# Patient Record
Sex: MALE | Race: WHITE | Employment: FULL TIME | URBAN - METROPOLITAN AREA
[De-identification: names, ages, dates, MRNs, and addresses within clinical notes are randomized per-mention and may not be internally consistent; named-entity substitution may affect disease eponyms.]

---

## 2021-01-04 ENCOUNTER — OFFICE VISIT (OUTPATIENT)
Dept: PRIMARY CARE CLINIC | Age: 51
End: 2021-01-04
Payer: COMMERCIAL

## 2021-01-04 VITALS
WEIGHT: 173.2 LBS | SYSTOLIC BLOOD PRESSURE: 120 MMHG | OXYGEN SATURATION: 97 % | TEMPERATURE: 98.8 F | BODY MASS INDEX: 24.25 KG/M2 | HEIGHT: 71 IN | DIASTOLIC BLOOD PRESSURE: 82 MMHG | RESPIRATION RATE: 16 BRPM | HEART RATE: 74 BPM

## 2021-01-04 DIAGNOSIS — Z13.220 SCREENING FOR HYPERLIPIDEMIA: ICD-10-CM

## 2021-01-04 DIAGNOSIS — N52.8 OTHER MALE ERECTILE DYSFUNCTION: ICD-10-CM

## 2021-01-04 DIAGNOSIS — F17.210 CIGARETTE NICOTINE DEPENDENCE WITHOUT COMPLICATION: ICD-10-CM

## 2021-01-04 DIAGNOSIS — R53.83 OTHER FATIGUE: ICD-10-CM

## 2021-01-04 PROCEDURE — 99203 OFFICE O/P NEW LOW 30 MIN: CPT | Performed by: INTERNAL MEDICINE

## 2021-01-04 RX ORDER — SILDENAFIL CITRATE 20 MG/1
TABLET ORAL
Qty: 20 TABLET | Refills: 1 | Status: SHIPPED | OUTPATIENT
Start: 2021-01-04 | End: 2021-11-15

## 2021-01-04 RX ORDER — SILDENAFIL 100 MG/1
TABLET, FILM COATED ORAL
COMMUNITY
Start: 2020-10-15 | End: 2021-01-04 | Stop reason: ALTCHOICE

## 2021-01-04 SDOH — ECONOMIC STABILITY: TRANSPORTATION INSECURITY
IN THE PAST 12 MONTHS, HAS LACK OF TRANSPORTATION KEPT YOU FROM MEETINGS, WORK, OR FROM GETTING THINGS NEEDED FOR DAILY LIVING?: NOT ASKED

## 2021-01-04 SDOH — HEALTH STABILITY: MENTAL HEALTH: HOW OFTEN DO YOU HAVE A DRINK CONTAINING ALCOHOL?: 4 OR MORE TIMES A WEEK

## 2021-01-04 ASSESSMENT — ENCOUNTER SYMPTOMS
SORE THROAT: 0
WHEEZING: 0
RHINORRHEA: 0
CHEST TIGHTNESS: 0
SWOLLEN GLANDS: 0
VISUAL CHANGE: 0
COUGH: 1
SINUS PRESSURE: 0
TROUBLE SWALLOWING: 0
NAUSEA: 0
ABDOMINAL PAIN: 0
VOMITING: 0
SHORTNESS OF BREATH: 0
BLOOD IN STOOL: 0
EYE DISCHARGE: 0
SINUS PAIN: 0
EYE PAIN: 0

## 2021-01-04 ASSESSMENT — PATIENT HEALTH QUESTIONNAIRE - PHQ9
2. FEELING DOWN, DEPRESSED OR HOPELESS: 0
SUM OF ALL RESPONSES TO PHQ QUESTIONS 1-9: 2
SUM OF ALL RESPONSES TO PHQ QUESTIONS 1-9: 2
SUM OF ALL RESPONSES TO PHQ9 QUESTIONS 1 & 2: 2
SUM OF ALL RESPONSES TO PHQ QUESTIONS 1-9: 2

## 2021-01-04 NOTE — PATIENT INSTRUCTIONS
Stop finasteride and testosterone supplements. Fasting blood work on February 1 [facility] lab. Regular exercise helps too. Sildenafil with cautions reminded. Tdap and flu vaccine from pharmacy. Strongly encourage you to quit using tobacco.   You can decrease your cigarette use by half every 2 weeks to quit smoking in 8 weeks or so. You can use off and on nicotine gum or lozenges to help quit smoking. The [address] for Disease Control and Prevention states:    Tobacco use harms every organ of the body which leads to disease and disability.  Tobacco use causes cancer, heart disease, stroke, and lung diseases (including emphysema, bronchitis, and chronic airway obstruction). We have many other ways to help you quit using tobacco.     We suggest this website:  www.smokefree. gov   You might also like this cell phone text message program.  Text message charges apply on your cell phone plan. Text the word QUIT to IQUIT to get started.  This program offers free telephone counseling.   Dial 7-128-QUIT-Now

## 2021-01-04 NOTE — PROGRESS NOTES
2021    Pietro Martinez (: 1970) is a 53 y.o. male, here for evaluation of the following medical concerns:    Chief Complaint   Patient presents with    New Patient       Since getting --not wanting sex. Before that he was having sex once a week. ED--getting hair pills---finasteride x 3 month-may be affecting his sexual function is explained to him this is a testosterone blocker medication so he should not be taking that. He has been taking sildenafil also from the mail-in pharmacy and he has been getting 100 mg but he does not need 100 mg he is using only 50 mg and 50 mg does fine with erection. Smoking knowing risk of cancer heart attack stroke and death and explained to him that he does need to quit smoking as soon as possible. 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 Fatigue  This is a new problem. The current episode started more than 1 year ago (9y). The problem occurs intermittently. The problem has been waxing and waning. Associated symptoms include coughing (  Smokers cough with no change) and fatigue. Pertinent negatives include no abdominal pain, arthralgias, chest pain, chills, congestion, fever, headaches, myalgias, nausea, numbness, rash, sore throat, swollen glands, urinary symptoms, visual change, vomiting or weakness. Nothing aggravates the symptoms. He has tried nothing for the symptoms. The treatment provided no relief. Review of Systems   Constitutional: Positive for fatigue. Negative for appetite change, chills, fever and unexpected weight change. HENT: Negative for congestion, ear discharge, ear pain, nosebleeds, rhinorrhea, sinus pressure, sinus pain, sore throat and trouble swallowing. Eyes: Negative for pain and discharge. Respiratory: Positive for cough (  Smokers cough with no change). Negative for chest tightness, shortness of breath and wheezing.     Cardiovascular: Negative for chest pain, palpitations and leg swelling. Gastrointestinal: Negative for abdominal pain, blood in stool, nausea and vomiting. Endocrine: Negative for polydipsia and polyphagia. Erectile dysfunction   Genitourinary: Negative for difficulty urinating, enuresis, flank pain and hematuria. Musculoskeletal: Negative for arthralgias and myalgias. Skin: Negative for rash. Neurological: Negative for facial asymmetry, weakness, light-headedness, numbness and headaches. Psychiatric/Behavioral: Negative for confusion. No current outpatient medications on file prior to visit. No current facility-administered medications on file prior to visit. No Known Allergies  History reviewed. No pertinent past medical history. History reviewed. No pertinent surgical history. Social History     Tobacco Use    Smoking status: Current Every Day Smoker     Types: Cigarettes    Smokeless tobacco: Never Used   Substance Use Topics    Alcohol use: Yes     Frequency: 4 or more times a week     Drinks per session: 5 or 6      Family History   Problem Relation Age of Onset    No Known Problems Mother     No Known Problems Father         Vitals:    01/04/21 1614   BP: 120/82   Site: Left Upper Arm   Position: Sitting   Cuff Size: Large Adult   Pulse: 74   Resp: 16   Temp: 98.8 °F (37.1 °C)   SpO2: 97%   Weight: 173 lb 3.2 oz (78.6 kg)   Height: 5' 10.5\" (1.791 m)     Estimated body mass index is 24.5 kg/m² as calculated from the following:    Height as of this encounter: 5' 10.5\" (1.791 m). Weight as of this encounter: 173 lb 3.2 oz (78.6 kg). Physical Exam  Vitals signs and nursing note reviewed. Constitutional:       General: He is not in acute distress. HENT:      Head: Normocephalic and atraumatic. Right Ear: External ear normal.      Left Ear: External ear normal.      Nose: Nose normal.   Eyes:      General: Lids are normal.      Extraocular Movements: Extraocular movements intact. Conjunctiva/sclera: Conjunctivae normal.      Pupils: Pupils are equal, round, and reactive to light. Neck:      Musculoskeletal: Neck supple. Thyroid: No thyromegaly. Vascular: No JVD. Trachea: No tracheal deviation. Cardiovascular:      Rate and Rhythm: Normal rate and regular rhythm. Heart sounds: Normal heart sounds. No gallop. Pulmonary:      Effort: Pulmonary effort is normal. No respiratory distress. Breath sounds: Normal breath sounds. No wheezing or rales. Abdominal:      General: Bowel sounds are normal. There is no distension. Palpations: Abdomen is soft. There is no mass. Tenderness: There is no abdominal tenderness. There is no guarding or rebound. Hernia: A hernia is present. Genitourinary:     Penis: Normal.       Testes: Normal.   Musculoskeletal:         General: No tenderness. Comments: No leg edema or calf tenderness   Lymphadenopathy:      Cervical: No cervical adenopathy. Skin:     General: Skin is warm and dry. Findings: No rash. Neurological:      General: No focal deficit present. Mental Status: He is alert and oriented to person, place, and time. Cranial Nerves: No cranial nerve deficit. Sensory: No sensory deficit. Psychiatric:         Mood and Affect: Mood normal.         Behavior: Behavior normal.         Thought Content: Thought content normal.         Judgment: Judgment normal.         ASSESSMENT/PLAN:  1. Other fatigue    - CBC WITH AUTO DIFFERENTIAL; Future  - Comprehensive Metabolic Panel; Future  - TSH with Reflex; Future  - T4, Free; Future    2. Other male erectile dysfunction    - Testosterone, free, total; Future  - sildenafil (REVATIO) 20 MG tablet; Take only 1 to 2 pills every 3 days as needed  Dispense: 20 tablet; Refill: 1    3. Screening for hyperlipidemia    - LIPID PANEL; Future    4. Nicotine dependence--      Return in about 5 weeks (around 2/8/2021) for labs.      Patient Instructions Stop finasteride and testosterone supplements. Fasting blood work on February 1 [facility] lab. Regular exercise helps too. Sildenafil with cautions reminded. Tdap and flu vaccine from pharmacy. Strongly encourage you to quit using tobacco.   You can decrease your cigarette use by half every 2 weeks to quit smoking in 8 weeks or so. You can use off and on nicotine gum or lozenges to help quit smoking. The [address] for Disease Control and Prevention states:    Tobacco use harms every organ of the body which leads to disease and disability.  Tobacco use causes cancer, heart disease, stroke, and lung diseases (including emphysema, bronchitis, and chronic airway obstruction). We have many other ways to help you quit using tobacco.     We suggest this website:  www.smokefree. gov   You might also like this cell phone text message program.  Text message charges apply on your cell phone plan. Text the word QUIT to IQUIT to get started.  This program offers free telephone counseling. Dial 1-800-QUIT-Now                        Electronically signed by Channing Enciso MD on 1/4/2021 at 6:27 PM     This dictation was generated by voice recognition computer software. Although all attempts are made to edit the dictation for accuracy, there may be errors in the transcription that are not intended.

## 2021-02-03 PROBLEM — Z13.220 SCREENING FOR HYPERLIPIDEMIA: Status: RESOLVED | Noted: 2021-01-04 | Resolved: 2021-02-03

## 2021-02-05 DIAGNOSIS — N52.8 OTHER MALE ERECTILE DYSFUNCTION: ICD-10-CM

## 2021-02-08 RX ORDER — SILDENAFIL CITRATE 20 MG/1
TABLET ORAL
Qty: 20 TABLET | Refills: 1 | OUTPATIENT
Start: 2021-02-08

## 2021-04-27 ENCOUNTER — TELEPHONE (OUTPATIENT)
Dept: PRIMARY CARE CLINIC | Age: 51
End: 2021-04-27

## 2021-04-27 NOTE — TELEPHONE ENCOUNTER
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

## 2021-09-13 DIAGNOSIS — N52.8 OTHER MALE ERECTILE DYSFUNCTION: ICD-10-CM

## 2021-09-13 RX ORDER — SILDENAFIL CITRATE 20 MG/1
TABLET ORAL
Qty: 20 TABLET | Refills: 1 | OUTPATIENT
Start: 2021-09-13

## 2021-10-18 ENCOUNTER — APPOINTMENT (OUTPATIENT)
Dept: CT IMAGING | Age: 51
End: 2021-10-18
Payer: COMMERCIAL

## 2021-10-18 ENCOUNTER — HOSPITAL ENCOUNTER (EMERGENCY)
Age: 51
Discharge: LEFT BEFORE DISCHARGE | End: 2021-10-18
Attending: EMERGENCY MEDICINE
Payer: COMMERCIAL

## 2021-10-18 ENCOUNTER — HOSPITAL ENCOUNTER (EMERGENCY)
Age: 51
Discharge: LEFT BEFORE DISCHARGE | End: 2021-10-18
Payer: COMMERCIAL

## 2021-10-18 VITALS
DIASTOLIC BLOOD PRESSURE: 83 MMHG | HEART RATE: 85 BPM | OXYGEN SATURATION: 98 % | TEMPERATURE: 100 F | RESPIRATION RATE: 16 BRPM | SYSTOLIC BLOOD PRESSURE: 132 MMHG

## 2021-10-18 VITALS
DIASTOLIC BLOOD PRESSURE: 82 MMHG | SYSTOLIC BLOOD PRESSURE: 125 MMHG | TEMPERATURE: 100.4 F | RESPIRATION RATE: 18 BRPM | OXYGEN SATURATION: 95 % | HEART RATE: 80 BPM

## 2021-10-18 DIAGNOSIS — R10.9 ABDOMINAL PAIN, UNSPECIFIED ABDOMINAL LOCATION: Primary | ICD-10-CM

## 2021-10-18 DIAGNOSIS — R74.01 TRANSAMINITIS: ICD-10-CM

## 2021-10-18 DIAGNOSIS — K57.92 ACUTE DIVERTICULITIS: Primary | ICD-10-CM

## 2021-10-18 DIAGNOSIS — Z53.29 LEFT AGAINST MEDICAL ADVICE: ICD-10-CM

## 2021-10-18 LAB
A/G RATIO: 0.9 (ref 1.1–2.2)
A/G RATIO: 0.9 (ref 1.1–2.2)
ALBUMIN SERPL-MCNC: 3.9 G/DL (ref 3.4–5)
ALBUMIN SERPL-MCNC: 3.9 G/DL (ref 3.4–5)
ALP BLD-CCNC: 108 U/L (ref 40–129)
ALP BLD-CCNC: 129 U/L (ref 40–129)
ALT SERPL-CCNC: 357 U/L (ref 10–40)
ALT SERPL-CCNC: 387 U/L (ref 10–40)
ANION GAP SERPL CALCULATED.3IONS-SCNC: 13 MMOL/L (ref 3–16)
ANION GAP SERPL CALCULATED.3IONS-SCNC: 9 MMOL/L (ref 3–16)
AST SERPL-CCNC: 315 U/L (ref 15–37)
AST SERPL-CCNC: 320 U/L (ref 15–37)
BASOPHILS ABSOLUTE: 0 K/UL (ref 0–0.2)
BASOPHILS ABSOLUTE: 0.1 K/UL (ref 0–0.2)
BASOPHILS RELATIVE PERCENT: 0.4 %
BASOPHILS RELATIVE PERCENT: 1 %
BILIRUB SERPL-MCNC: 0.4 MG/DL (ref 0–1)
BILIRUB SERPL-MCNC: 0.5 MG/DL (ref 0–1)
BILIRUBIN URINE: NEGATIVE
BLOOD, URINE: ABNORMAL
BUN BLDV-MCNC: 6 MG/DL (ref 7–20)
BUN BLDV-MCNC: 7 MG/DL (ref 7–20)
CALCIUM SERPL-MCNC: 9.3 MG/DL (ref 8.3–10.6)
CALCIUM SERPL-MCNC: 9.5 MG/DL (ref 8.3–10.6)
CHLORIDE BLD-SCNC: 95 MMOL/L (ref 99–110)
CHLORIDE BLD-SCNC: 96 MMOL/L (ref 99–110)
CLARITY: CLEAR
CO2: 20 MMOL/L (ref 21–32)
CO2: 24 MMOL/L (ref 21–32)
COLOR: YELLOW
CREAT SERPL-MCNC: 0.7 MG/DL (ref 0.9–1.3)
CREAT SERPL-MCNC: 0.9 MG/DL (ref 0.9–1.3)
EOSINOPHILS ABSOLUTE: 0.1 K/UL (ref 0–0.6)
EOSINOPHILS ABSOLUTE: 0.1 K/UL (ref 0–0.6)
EOSINOPHILS RELATIVE PERCENT: 0.6 %
EOSINOPHILS RELATIVE PERCENT: 0.7 %
EPITHELIAL CELLS, UA: 1 /HPF (ref 0–5)
GFR AFRICAN AMERICAN: >60
GFR AFRICAN AMERICAN: >60
GFR NON-AFRICAN AMERICAN: >60
GFR NON-AFRICAN AMERICAN: >60
GLOBULIN: 4.2 G/DL
GLOBULIN: 4.2 G/DL
GLUCOSE BLD-MCNC: 118 MG/DL (ref 70–99)
GLUCOSE BLD-MCNC: 121 MG/DL (ref 70–99)
GLUCOSE URINE: NEGATIVE MG/DL
HCT VFR BLD CALC: 49.4 % (ref 40.5–52.5)
HCT VFR BLD CALC: 50.7 % (ref 40.5–52.5)
HEMOGLOBIN: 17.5 G/DL (ref 13.5–17.5)
HEMOGLOBIN: 17.9 G/DL (ref 13.5–17.5)
HYALINE CASTS: 0 /LPF (ref 0–8)
KETONES, URINE: 15 MG/DL
LEUKOCYTE ESTERASE, URINE: NEGATIVE
LIPASE: 40 U/L (ref 13–60)
LIPASE: 43 U/L (ref 13–60)
LYMPHOCYTES ABSOLUTE: 0.5 K/UL (ref 1–5.1)
LYMPHOCYTES ABSOLUTE: 0.5 K/UL (ref 1–5.1)
LYMPHOCYTES RELATIVE PERCENT: 5.1 %
LYMPHOCYTES RELATIVE PERCENT: 5.1 %
MCH RBC QN AUTO: 33.3 PG (ref 26–34)
MCH RBC QN AUTO: 33.3 PG (ref 26–34)
MCHC RBC AUTO-ENTMCNC: 35.3 G/DL (ref 31–36)
MCHC RBC AUTO-ENTMCNC: 35.4 G/DL (ref 31–36)
MCV RBC AUTO: 94 FL (ref 80–100)
MCV RBC AUTO: 94.4 FL (ref 80–100)
MICROSCOPIC EXAMINATION: YES
MONOCYTES ABSOLUTE: 0.7 K/UL (ref 0–1.3)
MONOCYTES ABSOLUTE: 0.9 K/UL (ref 0–1.3)
MONOCYTES RELATIVE PERCENT: 7.4 %
MONOCYTES RELATIVE PERCENT: 9.5 %
NEUTROPHILS ABSOLUTE: 7.6 K/UL (ref 1.7–7.7)
NEUTROPHILS ABSOLUTE: 8 K/UL (ref 1.7–7.7)
NEUTROPHILS RELATIVE PERCENT: 84.3 %
NEUTROPHILS RELATIVE PERCENT: 85.9 %
NITRITE, URINE: NEGATIVE
PDW BLD-RTO: 13.1 % (ref 12.4–15.4)
PDW BLD-RTO: 13.4 % (ref 12.4–15.4)
PH UA: 5.5 (ref 5–8)
PLATELET # BLD: 134 K/UL (ref 135–450)
PLATELET # BLD: 135 K/UL (ref 135–450)
PMV BLD AUTO: 8.4 FL (ref 5–10.5)
PMV BLD AUTO: 8.7 FL (ref 5–10.5)
POTASSIUM REFLEX MAGNESIUM: 4 MMOL/L (ref 3.5–5.1)
POTASSIUM SERPL-SCNC: 3.7 MMOL/L (ref 3.5–5.1)
PROTEIN UA: 100 MG/DL
RBC # BLD: 5.25 M/UL (ref 4.2–5.9)
RBC # BLD: 5.37 M/UL (ref 4.2–5.9)
RBC UA: 4 /HPF (ref 0–4)
SODIUM BLD-SCNC: 128 MMOL/L (ref 136–145)
SODIUM BLD-SCNC: 129 MMOL/L (ref 136–145)
SPECIFIC GRAVITY UA: 1.01 (ref 1–1.03)
TOTAL PROTEIN: 8.1 G/DL (ref 6.4–8.2)
TOTAL PROTEIN: 8.1 G/DL (ref 6.4–8.2)
URINE REFLEX TO CULTURE: ABNORMAL
URINE TYPE: ABNORMAL
UROBILINOGEN, URINE: 1 E.U./DL
WBC # BLD: 9.1 K/UL (ref 4–11)
WBC # BLD: 9.4 K/UL (ref 4–11)
WBC UA: 2 /HPF (ref 0–5)

## 2021-10-18 PROCEDURE — 96374 THER/PROPH/DIAG INJ IV PUSH: CPT

## 2021-10-18 PROCEDURE — 83690 ASSAY OF LIPASE: CPT

## 2021-10-18 PROCEDURE — 6360000002 HC RX W HCPCS: Performed by: PHYSICIAN ASSISTANT

## 2021-10-18 PROCEDURE — U0005 INFEC AGEN DETEC AMPLI PROBE: HCPCS

## 2021-10-18 PROCEDURE — 81001 URINALYSIS AUTO W/SCOPE: CPT

## 2021-10-18 PROCEDURE — 74177 CT ABD & PELVIS W/CONTRAST: CPT

## 2021-10-18 PROCEDURE — U0003 INFECTIOUS AGENT DETECTION BY NUCLEIC ACID (DNA OR RNA); SEVERE ACUTE RESPIRATORY SYNDROME CORONAVIRUS 2 (SARS-COV-2) (CORONAVIRUS DISEASE [COVID-19]), AMPLIFIED PROBE TECHNIQUE, MAKING USE OF HIGH THROUGHPUT TECHNOLOGIES AS DESCRIBED BY CMS-2020-01-R: HCPCS

## 2021-10-18 PROCEDURE — 85025 COMPLETE CBC W/AUTO DIFF WBC: CPT

## 2021-10-18 PROCEDURE — 6370000000 HC RX 637 (ALT 250 FOR IP): Performed by: PHYSICIAN ASSISTANT

## 2021-10-18 PROCEDURE — 99282 EMERGENCY DEPT VISIT SF MDM: CPT

## 2021-10-18 PROCEDURE — 99283 EMERGENCY DEPT VISIT LOW MDM: CPT

## 2021-10-18 PROCEDURE — 80053 COMPREHEN METABOLIC PANEL: CPT

## 2021-10-18 PROCEDURE — 6370000000 HC RX 637 (ALT 250 FOR IP): Performed by: EMERGENCY MEDICINE

## 2021-10-18 PROCEDURE — 6360000004 HC RX CONTRAST MEDICATION: Performed by: PHYSICIAN ASSISTANT

## 2021-10-18 PROCEDURE — 2580000003 HC RX 258: Performed by: PHYSICIAN ASSISTANT

## 2021-10-18 PROCEDURE — 36415 COLL VENOUS BLD VENIPUNCTURE: CPT

## 2021-10-18 RX ORDER — ONDANSETRON 4 MG/1
4 TABLET, ORALLY DISINTEGRATING ORAL ONCE
Status: COMPLETED | OUTPATIENT
Start: 2021-10-18 | End: 2021-10-18

## 2021-10-18 RX ORDER — CIPROFLOXACIN 500 MG/1
500 TABLET, FILM COATED ORAL EVERY 12 HOURS SCHEDULED
Status: DISCONTINUED | OUTPATIENT
Start: 2021-10-18 | End: 2021-10-18 | Stop reason: HOSPADM

## 2021-10-18 RX ORDER — ACETAMINOPHEN 500 MG
1000 TABLET ORAL ONCE
Status: DISCONTINUED | OUTPATIENT
Start: 2021-10-18 | End: 2021-10-18 | Stop reason: HOSPADM

## 2021-10-18 RX ORDER — PROMETHAZINE HYDROCHLORIDE 25 MG/ML
25 INJECTION, SOLUTION INTRAMUSCULAR; INTRAVENOUS ONCE
Status: DISCONTINUED | OUTPATIENT
Start: 2021-10-18 | End: 2021-10-18 | Stop reason: HOSPADM

## 2021-10-18 RX ORDER — HYDROCODONE BITARTRATE AND ACETAMINOPHEN 5; 325 MG/1; MG/1
1 TABLET ORAL EVERY 6 HOURS PRN
Qty: 10 TABLET | Refills: 0 | Status: SHIPPED | OUTPATIENT
Start: 2021-10-18 | End: 2021-10-21

## 2021-10-18 RX ORDER — ONDANSETRON 4 MG/1
4 TABLET, ORALLY DISINTEGRATING ORAL EVERY 8 HOURS PRN
Qty: 20 TABLET | Refills: 0 | Status: SHIPPED | OUTPATIENT
Start: 2021-10-18 | End: 2021-11-15

## 2021-10-18 RX ORDER — KETOROLAC TROMETHAMINE 30 MG/ML
30 INJECTION, SOLUTION INTRAMUSCULAR; INTRAVENOUS ONCE
Status: COMPLETED | OUTPATIENT
Start: 2021-10-18 | End: 2021-10-18

## 2021-10-18 RX ORDER — 0.9 % SODIUM CHLORIDE 0.9 %
1000 INTRAVENOUS SOLUTION INTRAVENOUS ONCE
Status: COMPLETED | OUTPATIENT
Start: 2021-10-18 | End: 2021-10-18

## 2021-10-18 RX ORDER — CIPROFLOXACIN 500 MG/1
500 TABLET, FILM COATED ORAL 2 TIMES DAILY
Qty: 14 TABLET | Refills: 0 | Status: ON HOLD | OUTPATIENT
Start: 2021-10-18 | End: 2021-10-30 | Stop reason: HOSPADM

## 2021-10-18 RX ORDER — 0.9 % SODIUM CHLORIDE 0.9 %
1000 INTRAVENOUS SOLUTION INTRAVENOUS ONCE
Status: DISCONTINUED | OUTPATIENT
Start: 2021-10-18 | End: 2021-10-18 | Stop reason: HOSPADM

## 2021-10-18 RX ORDER — KETOROLAC TROMETHAMINE 30 MG/ML
30 INJECTION, SOLUTION INTRAMUSCULAR; INTRAVENOUS ONCE
Status: DISCONTINUED | OUTPATIENT
Start: 2021-10-18 | End: 2021-10-18 | Stop reason: HOSPADM

## 2021-10-18 RX ORDER — OXYCODONE HYDROCHLORIDE AND ACETAMINOPHEN 5; 325 MG/1; MG/1
2 TABLET ORAL ONCE
Status: COMPLETED | OUTPATIENT
Start: 2021-10-18 | End: 2021-10-18

## 2021-10-18 RX ORDER — METRONIDAZOLE 500 MG/1
500 TABLET ORAL 2 TIMES DAILY
Qty: 14 TABLET | Refills: 0 | Status: ON HOLD | OUTPATIENT
Start: 2021-10-18 | End: 2021-10-30 | Stop reason: HOSPADM

## 2021-10-18 RX ORDER — METRONIDAZOLE 250 MG/1
500 TABLET ORAL ONCE
Status: COMPLETED | OUTPATIENT
Start: 2021-10-18 | End: 2021-10-18

## 2021-10-18 RX ADMIN — KETOROLAC TROMETHAMINE 30 MG: 30 INJECTION, SOLUTION INTRAMUSCULAR; INTRAVENOUS at 19:23

## 2021-10-18 RX ADMIN — IOPAMIDOL 75 ML: 755 INJECTION, SOLUTION INTRAVENOUS at 18:10

## 2021-10-18 RX ADMIN — METRONIDAZOLE 500 MG: 250 TABLET ORAL at 19:51

## 2021-10-18 RX ADMIN — ONDANSETRON 4 MG: 4 TABLET, ORALLY DISINTEGRATING ORAL at 17:39

## 2021-10-18 RX ADMIN — CIPROFLOXACIN 500 MG: 500 TABLET, FILM COATED ORAL at 19:51

## 2021-10-18 RX ADMIN — SODIUM CHLORIDE 1000 ML: 9 INJECTION, SOLUTION INTRAVENOUS at 19:23

## 2021-10-18 RX ADMIN — OXYCODONE HYDROCHLORIDE AND ACETAMINOPHEN 2 TABLET: 5; 325 TABLET ORAL at 17:39

## 2021-10-18 ASSESSMENT — ENCOUNTER SYMPTOMS
ABDOMINAL PAIN: 1
BACK PAIN: 0
RHINORRHEA: 0
SHORTNESS OF BREATH: 0
NAUSEA: 1
SHORTNESS OF BREATH: 0
NAUSEA: 1
CHEST TIGHTNESS: 0
DIARRHEA: 0
VOMITING: 1
WHEEZING: 0
ABDOMINAL PAIN: 0
DIARRHEA: 0
VOMITING: 1
COUGH: 0
SORE THROAT: 0

## 2021-10-18 ASSESSMENT — PAIN SCALES - GENERAL
PAINLEVEL_OUTOF10: 7
PAINLEVEL_OUTOF10: 6
PAINLEVEL_OUTOF10: 7

## 2021-10-18 NOTE — ED PROVIDER NOTES
[address] [facility]        Pt Name: Micaela Palafox  MRN: [id number]  [place] 1970  Date of evaluation: 10/18/2021  Provider: LUDA Tapia CNP  PCP: Alon Jimenez MD  Note Started: 12:24 PM EDT       ALBERTO. I have evaluated this patient. My supervising physician was available for consultation. CHIEF COMPLAINT       Chief Complaint   Patient presents with    Abdominal Pain     thought at first it was food poisioning, however having vomiting, pain, nausea, and came from urgent care, [facility] [id number]   (Location, Timing/Onset, Context/Setting, Quality, Duration, Modifying Factors, Severity, Associated Signs and Symptoms)  Note limiting factors. Chief Complaint: Lower abdominal pain, nausea, and vomiting which began Saturday    Mark Anthony Joseph is a 49 y.o. male who presents to the emergency department today after being seen by local urgent care for evaluation of lower abdominal pain and vomiting. Patient states that urgent care was concerned for possible appendicitis. Patient is otherwise healthy without any chronic medical conditions. There is no history of any inflammatory bowel disease. He denies recent travel or known sick contacts. He currently reports a pain level of 7 out of 10. He describes the pain as constant dull with intermittent cramp-like sensations. He states the pain radiates across his entire lower abdomen. He has not taken anything for symptoms. He states that the pain radiates into his rectum as well. He denies any urinary symptoms. Patient states that he has had subjective fever and chills all weekend. He denies hematemesis, hematochezia, or melena. Nursing Notes were all reviewed and agreed with or any disagreements were addressed in the HPI.     REVIEW OF SYSTEMS    (2-9 systems for level 4, 10 or more for level 5)     Review of Systems   Constitutional: Positive for appetite change, chills and fever. HENT: Negative for congestion and sore throat. Eyes: Negative for visual disturbance. Respiratory: Negative for chest tightness, shortness of breath and wheezing. Cardiovascular: Negative for chest pain and palpitations. Gastrointestinal: Positive for abdominal pain, nausea and vomiting. Negative for diarrhea. Endocrine: Negative for polydipsia and polyuria. Genitourinary: Negative for difficulty urinating and dysuria. Musculoskeletal: Negative for back pain. Skin: Negative for rash and wound. Neurological: Negative for dizziness, weakness and light-headedness. Hematological: Does not bruise/bleed easily. Psychiatric/Behavioral: Negative for suicidal ideas. Positives and Pertinent negatives as per HPI. Except as noted above in the ROS, all other systems were reviewed and negative. PAST MEDICAL HISTORY   History reviewed. No pertinent past medical history. SURGICAL HISTORY   History reviewed. No pertinent surgical history. CURRENTMEDICATIONS       Previous Medications    SILDENAFIL (REVATIO) 20 MG TABLET    Take only 1 to 2 pills every 3 days as needed         ALLERGIES     Patient has no known allergies. FAMILYHISTORY       Family History   Problem Relation Age of Onset    No Known Problems Mother     No Known Problems Father           SOCIAL HISTORY       Social History     Tobacco Use    Smoking status: Current Every Day Smoker     Types: Cigarettes    Smokeless tobacco: Never Used   Vaping Use    Vaping Use: Never used   Substance Use Topics    Alcohol use: Yes    Drug use: Never       SCREENINGS             PHYSICAL EXAM    (up to 7 for level 4, 8 or more for level 5)     ED Triage Vitals [10/18/21 1203]   BP Temp Temp Source Pulse Resp SpO2 Height Weight   132/83 100 °F (37.8 °C) Oral 85 16 98 % -- --       Physical Exam  Vitals and nursing note reviewed.    Constitutional:       General: He is not in acute distress. Appearance: Normal appearance. He is well-developed. He is not ill-appearing, toxic-appearing or diaphoretic. HENT:      Head: Normocephalic and atraumatic. Right Ear: External ear normal.      Left Ear: External ear normal.      Nose: Nose normal.      Mouth/Throat:      Mouth: Mucous membranes are moist.   Eyes:      General:         Right eye: No discharge. Left eye: No discharge. Extraocular Movements: Extraocular movements intact. Cardiovascular:      Rate and Rhythm: Normal rate and regular rhythm. Heart sounds: Normal heart sounds. Pulmonary:      Effort: Pulmonary effort is normal. No respiratory distress. Breath sounds: Normal breath sounds. Abdominal:      General: Abdomen is flat. Bowel sounds are normal.      Palpations: Abdomen is soft. Tenderness: There is abdominal tenderness in the right lower quadrant, suprapubic area and left lower quadrant. There is no right CVA tenderness, left CVA tenderness, guarding or rebound. Musculoskeletal:      Cervical back: Normal range of motion and neck supple. Skin:     General: Skin is warm and dry. Capillary Refill: Capillary refill takes less than 2 seconds. Neurological:      General: No focal deficit present. Mental Status: He is alert and oriented to person, place, and time. Psychiatric:         Mood and Affect: Mood normal.         Behavior: Behavior normal.         DIAGNOSTIC RESULTS   LABS:    Labs Reviewed   CBC WITH AUTO DIFFERENTIAL   COMPREHENSIVE METABOLIC PANEL W/ REFLEX TO MG FOR LOW K   URINE RT REFLEX TO CULTURE   LIPASE       When ordered only abnormal lab results are displayed. All other labs were within normal range or not returned as of this dictation. EKG: When ordered, EKG's are interpreted by the Emergency Department Physician in the absence of a cardiologist.  Please see their note for interpretation of EKG.     RADIOLOGY:   Non-plain film images such as CT, Ultrasound and MRI are read by the radiologist. Plain radiographic images are visualized and preliminarily interpreted by the ED Provider with the below findings:        Interpretation per the Radiologist below, if available at the time of this note:    CT ABDOMEN PELVIS W IV CONTRAST Additional Contrast? None    (Results Pending)     No results found. PROCEDURES   Unless otherwise noted below, none     Procedures    CRITICAL CARE TIME   N/A    CONSULTS:  None      EMERGENCY DEPARTMENT COURSE and DIFFERENTIAL DIAGNOSIS/MDM:   Vitals:    Vitals:    10/18/21 1203   BP: 132/83   Pulse: 85   Resp: 16   Temp: 100 °F (37.8 °C)   TempSrc: Oral   SpO2: 98%       Patient was given the following medications:  Medications   ketorolac (TORADOL) injection 30 mg (has no administration in time range)   promethazine (PHENERGAN) injection 25 mg (has no administration in time range)   0.9 % sodium chloride bolus (has no administration in time range)   acetaminophen (TYLENOL) tablet 1,000 mg (has no administration in time range)       Previous records reviewed in order to gain further information regarding patient's PMH as well as his HPI. Nursing notes reviewed. This is a 64-year-old  male who presents for evaluation of vomiting and abdominal pain which began Saturday. Patient was seen at a local urgent care prior to arrival and sent for evaluation of possible appendicitis. Physical exam complete. Patient is nontoxic, febrile, but normotensive. He will be medicated for pain and nausea. Laboratory and imaging studies pending at this time. 12:20-5 by nursing staff that patient has chosen to leave without treatment. Apparently patient states that he cannot afford this visit and briskly ambulated toward triage lobby. I was not able to speak with this patient prior to him leaving. FINAL IMPRESSION      1. Abdominal pain, unspecified abdominal location    2.  Left against medical advice DISPOSITION/PLAN   DISPOSITION Lwbs After Rn Triage 10/18/2021 12:18:50 PM      PATIENT REFERRED TO:  No follow-up provider specified.     DISCHARGE MEDICATIONS:  New Prescriptions    No medications on file       DISCONTINUED MEDICATIONS:  Discontinued Medications    No medications on file              (Please note that portions of this note were completed with a voice recognition program.  Efforts were made to edit the dictations but occasionally words are mis-transcribed.)    LUDA Mcbride CNP (electronically signed)            LUDA Mcbride CNP  10/18/21 [id number]

## 2021-10-18 NOTE — ED NOTES
Pt brought to room from lobby, this RN assuming care at this time.       Shwetha Romo RN  10/18/21 [id number]

## 2021-10-18 NOTE — ED PROVIDER NOTES
[address] [facility]        Pt Name: Bennie Shepherd  MRN: [id number]  [place] 1970  Date of evaluation: 10/18/2021  Provider: Drew Leach PA-C  PCP: Kirsty Leal MD  Note Started: 6:46 PM EDT        I have seen and evaluated this patient with my supervising physician David Johnson MD.    [address] [place] [address]       Chief Complaint   Patient presents with    Abdominal Pain     patient reporting that he was sent from urgent care with possible appendicitis, patient was here earlier today and left AMA       HISTORY OF PRESENT ILLNESS   (Location, Timing/Onset, Context/Setting, Quality, Duration, Modifying Factors, Severity, Associated Signs and Symptoms)  Note limiting factors. Chief Complaint: Abdominal pain, fever    Bennie Shepherd is a 49 y.o. male who presents to the emergency department today for evaluation for abdominal pain, and fever. The patient states that since Saturday he has been experiencing nausea, lower abdominal pain, low-grade fevers. The patient states that his pain is throughout his lower abdomen is sharp, cramping, and is currently rated as a 7/10. The patient denies any known alleviating or aggravating factors. Patient was seen in urgent care today, and was sent to the emergency room to rule out appendicitis. The patient denies any past surgical history to the abdomen. Patient states that he has had some episodes of emesis today, he denies any bilious emesis, hematemesis or coffee-ground emesis. Patient denies any diarrhea. He denies any cough or congestion. He denies any urinary symptoms, patient otherwise has no complaints at this time. Nursing Notes were all reviewed and agreed with or any disagreements were addressed in the HPI. REVIEW OF SYSTEMS    (2-9 systems for level 4, 10 or more for level 5)     Review of Systems   Constitutional: Positive for fever.  Negative for activity change, appetite change and chills. HENT: Negative for congestion and rhinorrhea. Respiratory: Negative for cough and shortness of breath. Cardiovascular: Negative for chest pain. Gastrointestinal: Positive for nausea and vomiting. Negative for abdominal pain and diarrhea. Genitourinary: Negative for difficulty urinating, dysuria and hematuria. Positives and Pertinent negatives as per HPI. Except as noted above in the ROS, all other systems were reviewed and negative. PAST MEDICAL HISTORY   No past medical history on file. SURGICAL HISTORY   No past surgical history on file. CURRENTMEDICATIONS       Previous Medications    SILDENAFIL (REVATIO) 20 MG TABLET    Take only 1 to 2 pills every 3 days as needed         ALLERGIES     Patient has no known allergies. FAMILYHISTORY       Family History   Problem Relation Age of Onset    No Known Problems Mother     No Known Problems Father           SOCIAL HISTORY       Social History     Tobacco Use    Smoking status: Current Every Day Smoker     Types: Cigarettes    Smokeless tobacco: Never Used   Vaping Use    Vaping Use: Never used   Substance Use Topics    Alcohol use: Yes    Drug use: Never       SCREENINGS             PHYSICAL EXAM    (up to 7 for level 4, 8 or more for level 5)     ED Triage Vitals   BP Temp Temp src Pulse Resp SpO2 Height Weight   -- -- -- -- -- -- -- --       Physical Exam  Vitals and nursing note reviewed. Constitutional:       Appearance: He is well-developed. He is not diaphoretic. HENT:      Head: Normocephalic and atraumatic. Right Ear: External ear normal.      Left Ear: External ear normal.      Nose: Nose normal.   Eyes:      General:         Right eye: No discharge. Left eye: No discharge. Neck:      Trachea: No tracheal deviation. Cardiovascular:      Rate and Rhythm: Normal rate and regular rhythm. Heart sounds: No murmur heard.      Pulmonary:      Effort: Pulmonary effort is normal. No respiratory distress. Breath sounds: Normal breath sounds. No wheezing or rales. Abdominal:      General: Bowel sounds are normal. There is no distension. Palpations: Abdomen is soft. Tenderness: There is abdominal tenderness in the right lower quadrant, suprapubic area and left lower quadrant. There is no right CVA tenderness, left CVA tenderness, guarding or rebound. Musculoskeletal:         General: Normal range of motion. Cervical back: Normal range of motion and neck supple. Skin:     General: Skin is warm and dry. Neurological:      Mental Status: He is alert and oriented to person, place, and time.    Psychiatric:         Behavior: Behavior normal.         DIAGNOSTIC RESULTS   LABS:    Labs Reviewed   CBC WITH AUTO DIFFERENTIAL - Abnormal; Notable for the following components:       Result Value    Lymphocytes Absolute 0.5 (*)     All other components within normal limits    Narrative:     Performed at:  [facility]  [address], [address] [employer]   Phone [phone]   COMPREHENSIVE METABOLIC PANEL - Abnormal; Notable for the following components:    Sodium 129 (*)     Chloride 96 (*)     CO2 20 (*)     Glucose 121 (*)     BUN 6 (*)     CREATININE 0.7 (*)     Albumin/Globulin Ratio 0.9 (*)      (*)      (*)     All other components within normal limits    Narrative:     Performed at:  [facility]  [address], [address] [employer]   Phone [phone]   URINE RT REFLEX TO CULTURE - Abnormal; Notable for the following components:    Ketones, Urine 15 (*)     Blood, Urine SMALL (*)     Protein,  (*)     All other components within normal limits    Narrative:     Performed at:  [facility]  [address], [address] [employer]   Phone [phone]   LIPASE    Narrative:     Performed at:  [facility]  [address] [place], [place] [address]   Phone [phone]   MICROSCOPIC URINALYSIS    Narrative:     Performed at:  [facility]  [address], [address]   Phone [phone]       When ordered only abnormal lab results are displayed. All other labs were within normal range or not returned as of this dictation. EKG: When ordered, EKG's are interpreted by the Emergency Department Physician in the absence of a cardiologist.  Please see their note for interpretation of EKG. RADIOLOGY:   Non-plain film images such as CT, Ultrasound and MRI are read by the radiologist. Plain radiographic images are visualized and preliminarily interpreted by the ED Provider with the below findings:        Interpretation per the Radiologist below, if available at the time of this note:    CT ABDOMEN PELVIS W IV CONTRAST Additional Contrast? None   Final Result   Acute sigmoid diverticulitis. No free air or peridiverticular abscess. Multiple fluid-filled small bowel and colonic loops, nonspecific finding   which can be seen with acute enterocolitis. Normal appendix. Small to moderate-sized bilateral hydroceles partially included. CT ABDOMEN PELVIS W IV CONTRAST Additional Contrast? None    Result Date: 10/18/2021  EXAMINATION: CT OF THE ABDOMEN AND PELVIS WITH CONTRAST 10/18/2021 6:02 pm TECHNIQUE: CT of the abdomen and pelvis was performed with the administration of intravenous contrast. Multiplanar reformatted images are provided for review. Dose modulation, iterative reconstruction, and/or weight based adjustment of the mA/kV was utilized to reduce the radiation dose to as low as reasonably achievable. COMPARISON: None.  HISTORY: ORDERING SYSTEM PROVIDED HISTORY: RLQ pain TECHNOLOGIST PROVIDED HISTORY: Reason for exam:->RLQ pain Additional Contrast?->None Decision Support Exception - unselect if not a suspected or confirmed emergency medical condition->Emergency Medical Condition (MA) Reason for Exam: RLQ PAIN Acuity: Acute Type of Exam: Initial FINDINGS: Lower Chest: Lung bases clear. Organs: Marked diffuse hepatic steatosis. Unremarkable spleen, pancreas, adrenals, and right kidney. A 1.6 cm cyst in the posterolateral cortex of the left kidney. No definite cholelithiasis. GI/Bowel: Normal appendix. Multiple fluid-filled small bowel and colonic loops, nonspecific finding which can be seen with acute enterocolitis. Distal colonic diverticulosis. Circumferential wall thickening of the mid sigmoid colon with surrounding fat stranding, compatible with acute diverticulitis. Lindy Rast Pelvis: Incompletely distended urinary bladder. Normal sized prostate. Bilateral small to moderate-sized hydroceles, partially included. Peritoneum/Retroperitoneum: No free air or free fluid. No abdominal or pelvic abscess. No adenopathy. Minimal vascular calcification. No abdominal aortic aneurysm. Bones/Soft Tissues: Small bone island in the superior endplate of L5. No acute osseous abnormality. Acute sigmoid diverticulitis. No free air or peridiverticular abscess. Multiple fluid-filled small bowel and colonic loops, nonspecific finding which can be seen with acute enterocolitis. Normal appendix. Small to moderate-sized bilateral hydroceles partially included. PROCEDURES   Unless otherwise noted below, none     Procedures    CRITICAL CARE TIME   N/A    CONSULTS:  None      EMERGENCY DEPARTMENT COURSE and DIFFERENTIAL DIAGNOSIS/MDM:   Vitals: There were no vitals filed for this visit.     Patient was given the following medications:  Medications   0.9 % sodium chloride bolus (has no administration in time range)   ketorolac (TORADOL) injection 30 mg (has no administration in time range)   oxyCODONE-acetaminophen (PERCOCET) 5-325 MG per tablet 2 tablet (2 tablets Oral Given 10/18/21 2199)   ondansetron (ZOFRAN-ODT) disintegrating tablet 4 mg (4 mg Oral Given 10/18/21 1739)   iopamidol (ISOVUE-370) 76 % injection 75 mL (75 mLs IntraVENous Given 10/18/21 1810)           Briefly, this is a 52-year-old male who presents emergency department today for lower abdominal pain, fever and nausea which has been ongoing since Saturday. Physical examination he does have diffuse tenderness across the lower abdomen, there is no rebound or guarding. No peritoneal signs    CBC shows no evidence of leukocytosis or anemia. CMP does show elevated liver enzymes however his bilirubin is normal lipase is normal.  He does have a hyponatremia, patient will be placed in a room for IV fluids. Lipase is normal.    Also not vaccinated for Covid, could account for his elevated liver enzymes and lymphopenia, his COVID-19 test is pending. CT was obtained which does show acute diverticulitis. There is no free air or abscess. Urine does show mild ketonuria, will give IV fluids, plan for discharge home, Cipro Flagyl, and pain medication. This marks the end of my shift, please see attending note for details and final disposition    FINAL IMPRESSION      1. Acute diverticulitis          DISPOSITION/PLAN   DISPOSITION        PATIENT REFERRED TO:  Russel Silva, [address]  [phone]    Schedule an appointment as soon as possible for a visit in 3 days      [facility]  [address] [place] [address]  [phone]    As needed, If symptoms worsen      DISCHARGE MEDICATIONS:  New Prescriptions    CIPROFLOXACIN (CIPRO) 500 MG TABLET    Take 1 tablet by mouth 2 times daily for 7 days    HYDROCODONE-ACETAMINOPHEN (NORCO) 5-325 MG PER TABLET    Take 1 tablet by mouth every 6 hours as needed for Pain for up to 3 days.     METRONIDAZOLE (FLAGYL) 500 MG TABLET    Take 1 tablet by mouth 2 times daily for 7 days    ONDANSETRON (ZOFRAN ODT) 4 MG DISINTEGRATING TABLET    Take 1 tablet by mouth every 8 hours as needed for Nausea DISCONTINUED MEDICATIONS:  Discontinued Medications    No medications on file              (Please note that portions of this note were completed with a voice recognition program.  Efforts were made to edit the dictations but occasionally words are mis-transcribed.)    Glenn Ozuna PA-C (electronically signed)            Glenn Ozuna PA-C  10/18/21 3789

## 2021-10-18 NOTE — ED NOTES
This RN entered room to give tylenol, patient reporting that registration was just in the room stating it was going to be 400$ out of pocket for today's visit, patient reports, \"Im surprised since my wife works here, we don't have the money for that, Im leaving. \" This RN encouraged patient to stay despite the money portion of things, because if it is appendicitis it could get very serious and he could die from it, patient still wanting to leave.       Juan Pablo Sharp RN  10/18/21 0660

## 2021-10-19 LAB — SARS-COV-2, PCR: NOT DETECTED

## 2021-10-19 NOTE — ED PROVIDER NOTES
I audited this chart for disposition accuracy. This patient was seen and evaluated by Magdaleno Atkins NP (please see his/her documentation). This patient did not leave without being seen. Rather, the patient Left AMA. I corrected the disposition accordingly to properly reflect the care provided.     Lance Wesley MD  [facility], [place]  10/19/21 8362

## 2021-10-19 NOTE — ED PROVIDER NOTES
This patient was seen by the Mid-Level Provider. I have seen and examined the patient, agree with the workup, evaluation, management and diagnosis. Care plan has been discussed. My assessment reveals a 58-year-old male who presents with abdominal pain. This is a 58-year-old male with a history of alcohol abuse who presents with abdominal pain. The patient was originally seen by an urgent care center and sent here for possible appendicitis. The patient also was here earlier today but left AMA. Patient is also had some nausea and vomiting. Radiology results:    CT ABDOMEN PELVIS W IV CONTRAST Additional Contrast? None   Final Result   Acute sigmoid diverticulitis. No free air or peridiverticular abscess. Multiple fluid-filled small bowel and colonic loops, nonspecific finding   which can be seen with acute enterocolitis. Normal appendix. Small to moderate-sized bilateral hydroceles partially included.                LABS:    Labs Reviewed   CBC WITH AUTO DIFFERENTIAL - Abnormal; Notable for the following components:       Result Value    Lymphocytes Absolute 0.5 (*)     All other components within normal limits    Narrative:     Performed at:  [facility] [address] [facility] [employer]   Phone [phone]   COMPREHENSIVE METABOLIC PANEL - Abnormal; Notable for the following components:    Sodium 129 (*)     Chloride 96 (*)     CO2 20 (*)     Glucose 121 (*)     BUN 6 (*)     CREATININE 0.7 (*)     Albumin/Globulin Ratio 0.9 (*)      (*)      (*)     All other components within normal limits    Narrative:     Performed at:  [facility]  [address] [facility], [facility] [employer]   Phone [phone]   URINE RT REFLEX TO CULTURE - Abnormal; Notable for the following components:    Ketones, Urine 15 (*)     Blood, Urine SMALL (*)     Protein,  (*)     All other components within normal limits Narrative:     Performed at:  [facility]  [address] [facility],  [place], [address]   Phone [phone]   LIPASE    Narrative:     Performed at:  [facility] [address] [facility],  [place], [place]   Phone [phone]   MICROSCOPIC URINALYSIS    Narrative:     Performed at:  [facility] [address] [facility],  [place], [place]   Phone [phone]               Exam:    Well-nourished male in no acute distress. He had some minimal pain in the left lower quadrant with no guarding rebound. Positive bowel sounds. Medical decision makin-year-old male presents with abdominal pain. The patient was febrile. He had some pain in his left lower quadrant consistent with diverticulitis. A CT of the abdomen pelvis shows diverticulitis with no perforation or abscess. In addition, the patient had some transaminitis probably secondary to his heavy alcohol use. All of these were discussed with the patient. The patient also had a fever and was given Tylenol. On reexamination after some fluids and Tylenol he feels much better. Finally, the patient also had a mild hyponatremia. The patient was discharged with instructions to take Cipro and Flagyl was prescribed. He was given his first dose here. He understands he is to return if he feels worse. He was given a full course of Flagyl and Cipro. The patient was discharged in stable condition. I also discussed his transaminitis and the need to stop drinking alcohol. FINAL IMPRESSION:    1. Acute diverticulitis    2.  Mango Langley MD  10/18/21 2047

## 2021-10-25 ENCOUNTER — APPOINTMENT (OUTPATIENT)
Dept: CT IMAGING | Age: 51
DRG: 853 | End: 2021-10-25
Payer: COMMERCIAL

## 2021-10-25 ENCOUNTER — HOSPITAL ENCOUNTER (INPATIENT)
Age: 51
LOS: 6 days | Discharge: HOME HEALTH | DRG: 853 | End: 2021-10-31
Attending: HOSPITALIST | Admitting: HOSPITALIST
Payer: COMMERCIAL

## 2021-10-25 DIAGNOSIS — K57.20 COLONIC DIVERTICULAR ABSCESS: ICD-10-CM

## 2021-10-25 DIAGNOSIS — K57.92 ACUTE DIVERTICULITIS: Primary | ICD-10-CM

## 2021-10-25 PROBLEM — K65.1 INTRA-ABDOMINAL ABSCESS (HCC): Status: ACTIVE | Noted: 2021-10-25

## 2021-10-25 LAB
A/G RATIO: 0.9 (ref 1.1–2.2)
ALBUMIN SERPL-MCNC: 3.7 G/DL (ref 3.4–5)
ALP BLD-CCNC: 134 U/L (ref 40–129)
ALT SERPL-CCNC: 112 U/L (ref 10–40)
ANION GAP SERPL CALCULATED.3IONS-SCNC: 10 MMOL/L (ref 3–16)
AST SERPL-CCNC: 21 U/L (ref 15–37)
BANDED NEUTROPHILS RELATIVE PERCENT: 1 % (ref 0–7)
BASOPHILS ABSOLUTE: 0 K/UL (ref 0–0.2)
BASOPHILS RELATIVE PERCENT: 0 %
BILIRUB SERPL-MCNC: 0.6 MG/DL (ref 0–1)
BILIRUBIN URINE: NEGATIVE
BLOOD, URINE: NEGATIVE
BUN BLDV-MCNC: 4 MG/DL (ref 7–20)
CALCIUM SERPL-MCNC: 9.1 MG/DL (ref 8.3–10.6)
CHLORIDE BLD-SCNC: 98 MMOL/L (ref 99–110)
CLARITY: CLEAR
CO2: 25 MMOL/L (ref 21–32)
COLOR: YELLOW
CREAT SERPL-MCNC: 0.6 MG/DL (ref 0.9–1.3)
EOSINOPHILS ABSOLUTE: 0.3 K/UL (ref 0–0.6)
EOSINOPHILS RELATIVE PERCENT: 2 %
GFR AFRICAN AMERICAN: >60
GFR NON-AFRICAN AMERICAN: >60
GLOBULIN: 3.9 G/DL
GLUCOSE BLD-MCNC: 128 MG/DL (ref 70–99)
GLUCOSE URINE: NEGATIVE MG/DL
HCT VFR BLD CALC: 46.2 % (ref 40.5–52.5)
HEMOGLOBIN: 15.9 G/DL (ref 13.5–17.5)
KETONES, URINE: 15 MG/DL
LACTIC ACID: 0.9 MMOL/L (ref 0.4–2)
LEUKOCYTE ESTERASE, URINE: NEGATIVE
LIPASE: 30 U/L (ref 13–60)
LYMPHOCYTES ABSOLUTE: 1.7 K/UL (ref 1–5.1)
LYMPHOCYTES RELATIVE PERCENT: 11 %
MCH RBC QN AUTO: 33 PG (ref 26–34)
MCHC RBC AUTO-ENTMCNC: 34.5 G/DL (ref 31–36)
MCV RBC AUTO: 95.9 FL (ref 80–100)
MICROSCOPIC EXAMINATION: ABNORMAL
MONOCYTES ABSOLUTE: 1.4 K/UL (ref 0–1.3)
MONOCYTES RELATIVE PERCENT: 9 %
NEUTROPHILS ABSOLUTE: 12.2 K/UL (ref 1.7–7.7)
NEUTROPHILS RELATIVE PERCENT: 77 %
NITRITE, URINE: NEGATIVE
PDW BLD-RTO: 13.9 % (ref 12.4–15.4)
PH UA: 6.5 (ref 5–8)
PLATELET # BLD: 523 K/UL (ref 135–450)
PMV BLD AUTO: 8.2 FL (ref 5–10.5)
POTASSIUM SERPL-SCNC: 3.7 MMOL/L (ref 3.5–5.1)
PROTEIN UA: NEGATIVE MG/DL
RBC # BLD: 4.82 M/UL (ref 4.2–5.9)
RBC # BLD: NORMAL 10*6/UL
SODIUM BLD-SCNC: 133 MMOL/L (ref 136–145)
SPECIFIC GRAVITY UA: 1.02 (ref 1–1.03)
TOTAL PROTEIN: 7.6 G/DL (ref 6.4–8.2)
URINE REFLEX TO CULTURE: ABNORMAL
URINE TYPE: ABNORMAL
UROBILINOGEN, URINE: 0.2 E.U./DL
WBC # BLD: 15.7 K/UL (ref 4–11)

## 2021-10-25 PROCEDURE — APPNB30 APP NON BILLABLE TIME 0-30 MINS: Performed by: NURSE PRACTITIONER

## 2021-10-25 PROCEDURE — 99222 1ST HOSP IP/OBS MODERATE 55: CPT | Performed by: SURGERY

## 2021-10-25 PROCEDURE — 2580000003 HC RX 258: Performed by: HOSPITALIST

## 2021-10-25 PROCEDURE — APPSS60 APP SPLIT SHARED TIME 46-60 MINUTES: Performed by: NURSE PRACTITIONER

## 2021-10-25 PROCEDURE — 74177 CT ABD & PELVIS W/CONTRAST: CPT

## 2021-10-25 PROCEDURE — 96374 THER/PROPH/DIAG INJ IV PUSH: CPT

## 2021-10-25 PROCEDURE — 83605 ASSAY OF LACTIC ACID: CPT

## 2021-10-25 PROCEDURE — 6360000002 HC RX W HCPCS: Performed by: PHYSICIAN ASSISTANT

## 2021-10-25 PROCEDURE — 6360000002 HC RX W HCPCS: Performed by: HOSPITALIST

## 2021-10-25 PROCEDURE — 96375 TX/PRO/DX INJ NEW DRUG ADDON: CPT

## 2021-10-25 PROCEDURE — 83690 ASSAY OF LIPASE: CPT

## 2021-10-25 PROCEDURE — 1200000000 HC SEMI PRIVATE

## 2021-10-25 PROCEDURE — 81003 URINALYSIS AUTO W/O SCOPE: CPT

## 2021-10-25 PROCEDURE — 94760 N-INVAS EAR/PLS OXIMETRY 1: CPT

## 2021-10-25 PROCEDURE — 96376 TX/PRO/DX INJ SAME DRUG ADON: CPT

## 2021-10-25 PROCEDURE — 99283 EMERGENCY DEPT VISIT LOW MDM: CPT

## 2021-10-25 PROCEDURE — 6360000004 HC RX CONTRAST MEDICATION: Performed by: PHYSICIAN ASSISTANT

## 2021-10-25 PROCEDURE — 80053 COMPREHEN METABOLIC PANEL: CPT

## 2021-10-25 PROCEDURE — 85025 COMPLETE CBC W/AUTO DIFF WBC: CPT

## 2021-10-25 PROCEDURE — 6360000002 HC RX W HCPCS: Performed by: INTERNAL MEDICINE

## 2021-10-25 PROCEDURE — 36415 COLL VENOUS BLD VENIPUNCTURE: CPT

## 2021-10-25 PROCEDURE — 2580000003 HC RX 258: Performed by: PHYSICIAN ASSISTANT

## 2021-10-25 RX ORDER — ONDANSETRON 2 MG/ML
4 INJECTION INTRAMUSCULAR; INTRAVENOUS EVERY 6 HOURS PRN
Status: DISCONTINUED | OUTPATIENT
Start: 2021-10-25 | End: 2021-10-31 | Stop reason: HOSPADM

## 2021-10-25 RX ORDER — SODIUM CHLORIDE 9 MG/ML
INJECTION, SOLUTION INTRAVENOUS CONTINUOUS
Status: DISCONTINUED | OUTPATIENT
Start: 2021-10-25 | End: 2021-10-28 | Stop reason: ALTCHOICE

## 2021-10-25 RX ORDER — MORPHINE SULFATE 2 MG/ML
2 INJECTION, SOLUTION INTRAMUSCULAR; INTRAVENOUS EVERY 4 HOURS PRN
Status: DISCONTINUED | OUTPATIENT
Start: 2021-10-25 | End: 2021-10-26

## 2021-10-25 RX ORDER — FENTANYL CITRATE 50 UG/ML
100 INJECTION, SOLUTION INTRAMUSCULAR; INTRAVENOUS ONCE
Status: COMPLETED | OUTPATIENT
Start: 2021-10-25 | End: 2021-10-25

## 2021-10-25 RX ORDER — LORAZEPAM 2 MG/ML
0.5 INJECTION INTRAMUSCULAR EVERY 6 HOURS PRN
Status: DISCONTINUED | OUTPATIENT
Start: 2021-10-25 | End: 2021-10-31 | Stop reason: HOSPADM

## 2021-10-25 RX ORDER — SODIUM CHLORIDE 9 MG/ML
25 INJECTION, SOLUTION INTRAVENOUS PRN
Status: DISCONTINUED | OUTPATIENT
Start: 2021-10-25 | End: 2021-10-31 | Stop reason: HOSPADM

## 2021-10-25 RX ORDER — HYDROMORPHONE HYDROCHLORIDE 1 MG/ML
1 INJECTION, SOLUTION INTRAMUSCULAR; INTRAVENOUS; SUBCUTANEOUS ONCE
Status: COMPLETED | OUTPATIENT
Start: 2021-10-25 | End: 2021-10-25

## 2021-10-25 RX ORDER — POLYETHYLENE GLYCOL 3350 17 G/17G
17 POWDER, FOR SOLUTION ORAL DAILY PRN
Status: DISCONTINUED | OUTPATIENT
Start: 2021-10-25 | End: 2021-10-31 | Stop reason: HOSPADM

## 2021-10-25 RX ORDER — ONDANSETRON 2 MG/ML
4 INJECTION INTRAMUSCULAR; INTRAVENOUS ONCE
Status: COMPLETED | OUTPATIENT
Start: 2021-10-25 | End: 2021-10-25

## 2021-10-25 RX ORDER — 0.9 % SODIUM CHLORIDE 0.9 %
1000 INTRAVENOUS SOLUTION INTRAVENOUS ONCE
Status: COMPLETED | OUTPATIENT
Start: 2021-10-25 | End: 2021-10-25

## 2021-10-25 RX ORDER — ONDANSETRON 4 MG/1
4 TABLET, ORALLY DISINTEGRATING ORAL EVERY 6 HOURS PRN
Status: DISCONTINUED | OUTPATIENT
Start: 2021-10-25 | End: 2021-10-31 | Stop reason: HOSPADM

## 2021-10-25 RX ORDER — ACETAMINOPHEN 650 MG/1
650 SUPPOSITORY RECTAL EVERY 6 HOURS PRN
Status: DISCONTINUED | OUTPATIENT
Start: 2021-10-25 | End: 2021-10-31 | Stop reason: HOSPADM

## 2021-10-25 RX ORDER — MORPHINE SULFATE 4 MG/ML
4 INJECTION, SOLUTION INTRAMUSCULAR; INTRAVENOUS EVERY 30 MIN PRN
Status: DISCONTINUED | OUTPATIENT
Start: 2021-10-25 | End: 2021-10-25 | Stop reason: SDUPTHER

## 2021-10-25 RX ORDER — HYDROMORPHONE HYDROCHLORIDE 1 MG/ML
1 INJECTION, SOLUTION INTRAMUSCULAR; INTRAVENOUS; SUBCUTANEOUS EVERY 4 HOURS PRN
Status: DISCONTINUED | OUTPATIENT
Start: 2021-10-25 | End: 2021-10-26

## 2021-10-25 RX ORDER — SODIUM CHLORIDE 0.9 % (FLUSH) 0.9 %
5-40 SYRINGE (ML) INJECTION PRN
Status: DISCONTINUED | OUTPATIENT
Start: 2021-10-25 | End: 2021-10-31 | Stop reason: HOSPADM

## 2021-10-25 RX ORDER — KETOROLAC TROMETHAMINE 15 MG/ML
15 INJECTION, SOLUTION INTRAMUSCULAR; INTRAVENOUS EVERY 6 HOURS PRN
Status: DISPENSED | OUTPATIENT
Start: 2021-10-25 | End: 2021-10-30

## 2021-10-25 RX ORDER — SODIUM CHLORIDE 0.9 % (FLUSH) 0.9 %
5-40 SYRINGE (ML) INJECTION EVERY 12 HOURS SCHEDULED
Status: DISCONTINUED | OUTPATIENT
Start: 2021-10-25 | End: 2021-10-31 | Stop reason: HOSPADM

## 2021-10-25 RX ORDER — ACETAMINOPHEN 325 MG/1
650 TABLET ORAL EVERY 6 HOURS PRN
Status: DISCONTINUED | OUTPATIENT
Start: 2021-10-25 | End: 2021-10-31 | Stop reason: HOSPADM

## 2021-10-25 RX ADMIN — HYDROMORPHONE HYDROCHLORIDE 1 MG: 1 INJECTION, SOLUTION INTRAMUSCULAR; INTRAVENOUS; SUBCUTANEOUS at 14:04

## 2021-10-25 RX ADMIN — HYDROMORPHONE HYDROCHLORIDE 1 MG: 1 INJECTION, SOLUTION INTRAMUSCULAR; INTRAVENOUS; SUBCUTANEOUS at 22:30

## 2021-10-25 RX ADMIN — LORAZEPAM 0.5 MG: 2 INJECTION INTRAMUSCULAR; INTRAVENOUS at 18:27

## 2021-10-25 RX ADMIN — MORPHINE SULFATE 4 MG: 4 INJECTION INTRAVENOUS at 09:18

## 2021-10-25 RX ADMIN — ONDANSETRON 4 MG: 2 INJECTION INTRAMUSCULAR; INTRAVENOUS at 08:17

## 2021-10-25 RX ADMIN — PIPERACILLIN AND TAZOBACTAM 3375 MG: 3; .375 INJECTION, POWDER, LYOPHILIZED, FOR SOLUTION INTRAVENOUS at 10:41

## 2021-10-25 RX ADMIN — ENOXAPARIN SODIUM 40 MG: 40 INJECTION SUBCUTANEOUS at 15:22

## 2021-10-25 RX ADMIN — IOPAMIDOL 75 ML: 755 INJECTION, SOLUTION INTRAVENOUS at 09:00

## 2021-10-25 RX ADMIN — Medication 10 ML: at 20:47

## 2021-10-25 RX ADMIN — MORPHINE SULFATE 4 MG: 4 INJECTION INTRAVENOUS at 08:17

## 2021-10-25 RX ADMIN — HYDROMORPHONE HYDROCHLORIDE 1 MG: 1 INJECTION, SOLUTION INTRAMUSCULAR; INTRAVENOUS; SUBCUTANEOUS at 10:08

## 2021-10-25 RX ADMIN — PIPERACILLIN AND TAZOBACTAM 3375 MG: 3; .375 INJECTION, POWDER, LYOPHILIZED, FOR SOLUTION INTRAVENOUS at 17:26

## 2021-10-25 RX ADMIN — SODIUM CHLORIDE 1000 ML: 9 INJECTION, SOLUTION INTRAVENOUS at 08:16

## 2021-10-25 RX ADMIN — FENTANYL CITRATE 100 MCG: 0.05 INJECTION, SOLUTION INTRAMUSCULAR; INTRAVENOUS at 10:56

## 2021-10-25 RX ADMIN — SODIUM CHLORIDE: 9 INJECTION, SOLUTION INTRAVENOUS at 14:10

## 2021-10-25 RX ADMIN — HYDROMORPHONE HYDROCHLORIDE 1 MG: 1 INJECTION, SOLUTION INTRAMUSCULAR; INTRAVENOUS; SUBCUTANEOUS at 18:26

## 2021-10-25 ASSESSMENT — ENCOUNTER SYMPTOMS
SHORTNESS OF BREATH: 0
NAUSEA: 1
RHINORRHEA: 0
VOMITING: 0
DIARRHEA: 0
COUGH: 0
ABDOMINAL PAIN: 1

## 2021-10-25 ASSESSMENT — PAIN SCALES - GENERAL
PAINLEVEL_OUTOF10: 7
PAINLEVEL_OUTOF10: 9
PAINLEVEL_OUTOF10: 6
PAINLEVEL_OUTOF10: 9
PAINLEVEL_OUTOF10: 10
PAINLEVEL_OUTOF10: 9

## 2021-10-25 ASSESSMENT — PAIN DESCRIPTION - DESCRIPTORS: DESCRIPTORS: SPASM

## 2021-10-25 ASSESSMENT — PAIN DESCRIPTION - PAIN TYPE: TYPE: ACUTE PAIN

## 2021-10-25 ASSESSMENT — PAIN DESCRIPTION - LOCATION: LOCATION: ABDOMEN

## 2021-10-25 ASSESSMENT — PAIN DESCRIPTION - ORIENTATION: ORIENTATION: LOWER

## 2021-10-25 NOTE — PROGRESS NOTES
Message sent to Dr. Noreen Lee : \"Pt just got up to floor from ED. He received fentanyl, dilaudid, and morphine in the ED. I see the only pain med ordered right now is morphine and he said that that hasn't helped at all. Could we get fentanyl or dilaudid? Additionally, pt was NPO. I see a regular diet ordered, is this correct? \" Waiting for response.

## 2021-10-25 NOTE — ED PROVIDER NOTES
[address] [facility]        Pt Name: Alea Heard  MRN: [id number]  [place] 1970  Date of evaluation: 10/25/2021  Provider: Danica Johnson PA-C  PCP: [facility], [place]  Note Started: 7:49 AM EDT       ALBERTO. I have evaluated this patient. My supervising physician was available for consultation. CHIEF COMPLAINT       Chief Complaint   Patient presents with    Abdominal Pain     Pt in with c/o abd pain x1 week. Pt states was here one week ago for same complaint. Reports no BM for one week        HISTORY OF PRESENT ILLNESS   (Location, Timing/Onset, Context/Setting, Quality, Duration, Modifying Factors, Severity, Associated Signs and Symptoms)  Note limiting factors. Chief Complaint: Abdominal pain    Alea Heard is a 49 y.o. male who presents to the emergency department today for evaluation for abdominal pain. The patient states that he was actually seen on 10/18/2021 with similar symptoms. The patient was diagnosed with diverticulitis and was given pain medication, and antibiotics. Patient states that he since completed the course of antibiotics but he continues to have pain. The patient states that his pain is throughout his abdomen, sharp, constant is rated as a 9/10. He states that his pain does improve with Advil as well as his pain medication at home. Otherwise denies any known alleviating or any factors. He is nauseous although has no vomiting, no diarrhea. He states that he is passing gas but he is actually not had a bowel movement since being diagnosed with diverticulitis 1 week ago. Patient denies any fever or chills. No cough or congestion. He denies any dysuria or hematuria. Patient denies any chest pain or shortness of breath. The patient denies any past surgical history to the abdomen, no other complaints    Nursing Notes were all reviewed and agreed with or any disagreements were addressed in the HPI.     REVIEW OF SYSTEMS    (2-9 systems for level 4, 10 or more for level 5)     Review of Systems   Constitutional: Negative for activity change, appetite change, chills and fever. HENT: Negative for congestion and rhinorrhea. Respiratory: Negative for cough and shortness of breath. Cardiovascular: Negative for chest pain. Gastrointestinal: Positive for abdominal pain and nausea. Negative for diarrhea and vomiting. Genitourinary: Negative for difficulty urinating, dysuria and hematuria. Positives and Pertinent negatives as per HPI. Except as noted above in the ROS, all other systems were reviewed and negative. PAST MEDICAL HISTORY   History reviewed. No pertinent past medical history. SURGICAL HISTORY   History reviewed. No pertinent surgical history. CURRENTMEDICATIONS       Previous Medications    CIPROFLOXACIN (CIPRO) 500 MG TABLET    Take 1 tablet by mouth 2 times daily for 7 days    METRONIDAZOLE (FLAGYL) 500 MG TABLET    Take 1 tablet by mouth 2 times daily for 7 days    ONDANSETRON (ZOFRAN ODT) 4 MG DISINTEGRATING TABLET    Take 1 tablet by mouth every 8 hours as needed for Nausea    SILDENAFIL (REVATIO) 20 MG TABLET    Take only 1 to 2 pills every 3 days as needed         ALLERGIES     Patient has no known allergies. FAMILYHISTORY       Family History   Problem Relation Age of Onset    No Known Problems Mother     No Known Problems Father           SOCIAL HISTORY       Social History     Tobacco Use    Smoking status: Current Every Day Smoker     Types: Cigarettes    Smokeless tobacco: Never Used   Vaping Use    Vaping Use: Never used   Substance Use Topics    Alcohol use:  Yes    Drug use: Never       SCREENINGS    [place] Coma Scale  Eye Opening: Spontaneous  Best Verbal Response: Oriented  Best Motor Response: Obeys commands  [place] Coma Scale Score: 15        PHYSICAL EXAM    (up to 7 for level 4, 8 or more for level 5)     ED Triage Vitals [10/25/21 0737]   BP Temp Temp Source Pulse Resp SpO2 Height Weight   (!) 139/97 97.9 °F (36.6 °C) Oral 72 16 99 % 5' 10\" (1.778 m) 165 lb (74.8 kg)       Physical Exam  Vitals and nursing note reviewed. Constitutional:       Appearance: He is well-developed. He is not diaphoretic. HENT:      Head: Normocephalic and atraumatic. Right Ear: External ear normal.      Left Ear: External ear normal.      Nose: Nose normal.   Eyes:      General:         Right eye: No discharge. Left eye: No discharge. Neck:      Trachea: No tracheal deviation. Cardiovascular:      Rate and Rhythm: Normal rate and regular rhythm. Heart sounds: No murmur heard. Pulmonary:      Effort: Pulmonary effort is normal. No respiratory distress. Breath sounds: Normal breath sounds. No wheezing or rales. Abdominal:      General: Bowel sounds are normal. There is no distension. Tenderness: There is abdominal tenderness in the right lower quadrant, periumbilical area, suprapubic area and left lower quadrant. Musculoskeletal:         General: Normal range of motion. Cervical back: Normal range of motion and neck supple. Skin:     General: Skin is warm and dry. Neurological:      Mental Status: He is alert and oriented to person, place, and time.    Psychiatric:         Behavior: Behavior normal.         DIAGNOSTIC RESULTS   LABS:    Labs Reviewed   CBC WITH AUTO DIFFERENTIAL - Abnormal; Notable for the following components:       Result Value    WBC 15.7 (*)     Platelets 476 (*)     Neutrophils Absolute 12.2 (*)     Monocytes Absolute 1.4 (*)     All other components within normal limits    Narrative:     Performed at:  [facility] [address], [place], [facility] [address]   Phone [phone]   COMPREHENSIVE METABOLIC PANEL - Abnormal; Notable for the following components:    Sodium 133 (*)     Chloride 98 (*)     Glucose 128 (*)     BUN 4 (*)     CREATININE 0.6 (*)     Albumin/Globulin Ratio 0.9 (*) Alkaline Phosphatase 134 (*)      (*)     All other components within normal limits    Narrative:     Performed at:  [facility]  [address], [address]   Phone [phone]   URINE RT REFLEX TO CULTURE - Abnormal; Notable for the following components:    Ketones, Urine 15 (*)     All other components within normal limits    Narrative:     Performed at:  [facility]  [address],  [place], [address]   Phone [phone]   LIPASE    Narrative:     Performed at:  [facility]  [address],  [place], [address]   Phone [phone]   LACTIC ACID, PLASMA    Narrative:     Performed at:  [facility]  [address],  [place], [address]   Phone [phone]       When ordered only abnormal lab results are displayed. All other labs were within normal range or not returned as of this dictation. EKG: When ordered, EKG's are interpreted by the Emergency Department Physician in the absence of a cardiologist.  Please see their note for interpretation of EKG. RADIOLOGY:   Non-plain film images such as CT, Ultrasound and MRI are read by the radiologist. Plain radiographic images are visualized and preliminarily interpreted by the ED Provider with the below findings:        Interpretation per the Radiologist below, if available at the time of this note:    CT ABDOMEN PELVIS W IV CONTRAST Additional Contrast? None   Final Result   1. Persistent changes of sigmoid diverticulitis, now with findings of locally   contained perforation and suspected developing 2.5 cm diverticular abscess   2. 1.5 cm hyperdense left renal lesion. Recommend dedicated renal MRI to   assess for mass versus proteinaceous cyst   3.  Hepatic steatosis           CT ABDOMEN PELVIS W IV CONTRAST Additional Contrast? None    Result Date: 10/18/2021  EXAMINATION: CT OF THE ABDOMEN AND PELVIS WITH CONTRAST 10/18/2021 6:02 pm TECHNIQUE: CT of the abdomen and pelvis was performed with the administration of intravenous contrast. Multiplanar reformatted images are provided for review. Dose modulation, iterative reconstruction, and/or weight based adjustment of the mA/kV was utilized to reduce the radiation dose to as low as reasonably achievable. COMPARISON: None. HISTORY: ORDERING SYSTEM PROVIDED HISTORY: RLQ pain TECHNOLOGIST PROVIDED HISTORY: Reason for exam:->RLQ pain Additional Contrast?->None Decision Support Exception - unselect if not a suspected or confirmed emergency medical condition->Emergency Medical Condition (MA) Reason for Exam: RLQ PAIN Acuity: Acute Type of Exam: Initial FINDINGS: Lower Chest: Lung bases clear. Organs: Marked diffuse hepatic steatosis. Unremarkable spleen, pancreas, adrenals, and right kidney. A 1.6 cm cyst in the posterolateral cortex of the left kidney. No definite cholelithiasis. GI/Bowel: Normal appendix. Multiple fluid-filled small bowel and colonic loops, nonspecific finding which can be seen with acute enterocolitis. Distal colonic diverticulosis. Circumferential wall thickening of the mid sigmoid colon with surrounding fat stranding, compatible with acute diverticulitis. Mich Leo Pelvis: Incompletely distended urinary bladder. Normal sized prostate. Bilateral small to moderate-sized hydroceles, partially included. Peritoneum/Retroperitoneum: No free air or free fluid. No abdominal or pelvic abscess. No adenopathy. Minimal vascular calcification. No abdominal aortic aneurysm. Bones/Soft Tissues: Small bone island in the superior endplate of L5. No acute osseous abnormality. Acute sigmoid diverticulitis. No free air or peridiverticular abscess. Multiple fluid-filled small bowel and colonic loops, nonspecific finding which can be seen with acute enterocolitis. Normal appendix. Small to moderate-sized bilateral hydroceles partially included. PROCEDURES   Unless otherwise noted below, none     Procedures    CRITICAL CARE TIME   N/A    CONSULTS:  None      EMERGENCY DEPARTMENT COURSE and DIFFERENTIAL DIAGNOSIS/MDM:   Vitals:    Vitals:    10/25/21 0737 10/25/21 0830 10/25/21 0900   BP: (!) 139/97 (!) 145/85 127/89   Pulse: 72     Resp: 16     Temp: 97.9 °F (36.6 °C)     TempSrc: Oral     SpO2: 99% 96% 98%   Weight: 165 lb (74.8 kg)     Height: 5' 10\" (1.778 m)         Patient was given the following medications:  Medications   morphine injection 4 mg (4 mg IntraVENous Given 10/25/21 0918)   piperacillin-tazobactam (ZOSYN) 3,375 mg in dextrose 5 % 50 mL IVPB (mini-bag) (has no administration in time range)   HYDROmorphone HCl PF (DILAUDID) injection 1 mg (has no administration in time range)   0.9 % sodium chloride bolus (1,000 mLs IntraVENous New Bag 10/25/21 0816)   ondansetron (ZOFRAN) injection 4 mg (4 mg IntraVENous Given 10/25/21 0817)   iopamidol (ISOVUE-370) 76 % injection 75 mL (75 mLs IntraVENous Given 10/25/21 0900)           Briefly, this is a 61-year-old male, previously healthy presents emergency department today with abdominal pain x1 week. Patient was actually seen in the emergency room 1 week ago for similar symptoms. Associated nausea. Was diagnosed with diverticulitis at the time, since completed course of antibiotics but continues to have pain. On examination, patient does have diffuse tenderness across the abdomen, although seems to be increasing on the right side of the abdomen. Urine shows no evidence of infection or blood. Lactic acid is normal.  He has leukocytosis of 15.7. Lipase normal.  CT demonstrates persistent changes of diverticulitis with abscess. Patient will be given Zosyn. FINAL IMPRESSION      1. Acute diverticulitis    2.  Colonic diverticular abscess          DISPOSITION/PLAN   DISPOSITION Decision To Admit 10/25/2021 09:38:07 AM      PATIENT REFERRED TO:  No follow-up provider specified.     DISCHARGE MEDICATIONS:  New Prescriptions    No medications on file       DISCONTINUED MEDICATIONS:  Discontinued Medications    No medications on file              (Please note that portions of this note were completed with a voice recognition program.  Efforts were made to edit the dictations but occasionally words are mis-transcribed.)    Andrade Dinero PA-C (electronically signed)            Andrade Dinero PA-C  10/25/21 1000

## 2021-10-25 NOTE — PROGRESS NOTES
Pt up to unit via wheelchair. VSS. Pt rates pain 9/10. Pt oriented to room and questions answered. No further needs at this time.

## 2021-10-25 NOTE — ED NOTES
Report to Sandy Allen up to floor per [facility] with RN     Macie Carrel, RN  10/25/21 3640

## 2021-10-25 NOTE — PLAN OF CARE
[place] [address] and Laparoscopic Surgery        Assessment & Plan of Care    History of Present Illness: Mr. Ely Javier is a 49 y.o. male who originally developed left lower quadrant pain 9 days ago. He was seen in the ED 1 week ago for these symptoms, diagnosed with acute uncomplicated sigmoid diverticulitis and discharge home with analgesics, Cipro, and Flagyl--which he took as prescribed and continues to take. He reports that the pain seemed to ease after a few days on oral antibiotics, but started to get more intense 4 days ago and progressed. On presentation to the ED this morning the pain remains in the left lower quadrant, is sharp, constant, radiates across the lower abdomen, and he rates the intensity a 9-10 out of 10. Pain is worse in the mornings, if he holds his bladder for an extended time, and some foods seemed to aggravate (BBQ rib sandwich); some temporary relief with analgesics prescribed during last ED visit. Associated nausea, vomiting, sweats, an episode of diarrhea followed by constipation--last BM was 1 week ago. Reports he is passing some flatus. Notes smear of blood when wiping; believes that he has hemorrhoids. Denies fevers, chills, hematochezia, melena, dysuria, hematuria, bloating, chest pain, SOB, or known COVID-19 exposure. Nothing similar in the past; no prior episodes of diverticulitis. No prior colonoscopy. Denies significant medical history. No prior abdominal surgeries. Current everyday smoker and admits to smoking marijuana. Admits to drinking 6 - 25 oz. beers per day; last drink was 1 week ago.     Physical Exam:  CONSTITUTIONAL:  alert, no apparent distress and normal weight  NEUROLOGIC:  Mental Status Exam:  Level of Alertness:   awake  Orientation:   person, place, time  EYES:  sclera clear  ENT:  normocepalic, without obvious abnormality  NECK:  supple, symmetrical, trachea midline  LUNGS:  clear to auscultation  CARDIOVASCULAR:  regular rate and rhythm and no murmur noted  ABDOMEN: soft, non-distended, tenderness noted in the suprapubic region and in the left lower quadrant, voluntary guarding present, no masses palpated, normal bowel sounds,  no scars, and hernia absent  Extremities: no edema  SKIN:  no bruising or bleeding and normal skin color, texture, turgor    Assessment:  Acute sigmoid diverticulitis with contained perforation and abscess  Abnormal liver function tests    Plan:  1. No plans for surgical or IR intervention at this time, continue supportive care  2. NPO, ice; monitor bowel function  3. IV hydration; monitor and correct electrolytes  4. Antibiotics  5. Activity as tolerated  6. PRN analgesics and antiemetics--minimizing narcotics as tolerated  7. DVT prophylaxis with Lovenox  8. Management of medical comorbid etiologies per primary team and consulting services    EDUCATION:  Educated patient on plan of care and disease process--all questions answered. Plans discussed with patient and nursing. Reviewed and discussed with Dr. Fabienne Silverio consult to follow.       Signed:  LUDA Talbert - CNP  10/25/2021 9:41 AM

## 2021-10-25 NOTE — CARE COORDINATION
CM reviewed chart for d/c planning. Pt from home. Independent. Per MD note: acute diverticulitis with diverticular abscess. General surgery consult pending. CM will monitor for d/c needs.     Carmelina Arteaga RN, BSN  [phone]

## 2021-10-25 NOTE — ED PROVIDER NOTES
I did not perform history or physical on Molina Ayers. This patient was seen independently by an ALBERTO. I did review the EKG, which is documented below. EKG  The Ekg interpreted by me in the absence of a cardiologist shows. normal sinus rhythm with a rate of 82  Axis is   Normal  QTc is  normal  Intervals and Durations are unremarkable. No specific ST-T wave changes appreciated. No evidence of acute ischemia.    No significant change from prior EKG dated 11/4/2020           Cresenciano Dubin, MD  10/25/21 1032

## 2021-10-25 NOTE — CONSULTS
[address] [facility]     Consult                                                     Patient Name: Alea Heard  MRN: [id number]  [place]: 1970  Admission date: 10/25/2021  7:32 AM   Date of evaluation: 10/25/2021  Primary Care Physician: Jeyson Pantoja MD  Requesting provider: Danica Johnson PA-C  Reason for consult: Abdominal pain  History of Present Illness:    Mr. Kamilla Leon is a 49 y.o. male who presents with sharp left lower quadrant pain about 9 days ago. After several days presented to ED and found to have uncomplicated sigmoid diverticulitis, started on oral antibiotics and discharged. Without improvement for several days and intense pain, he presents today for re-evaluation. He's found to have worsening disease with small abscess and admitted. Pain worse with distended bladder, eating, and relieved with pain medication in hospital. Nausea, vomiting, sweats, diarrhea with subsequent constipation (last bowel movement 1 week ago, passing flatus). Denies fevers, chest pain, dyspnea, cough, dysuria, jaundice, change in appetite weight or other complaints. No significant medical conditions. Never had abdominal surgery. Never had colonoscopy and no family history of colorectal malignancy or inflammatory bowel disease. Smokes, drinks significantly but not for a week    Past Medical History:    History reviewed. No pertinent past medical history. Past Surgical History:    History reviewed. No pertinent surgical history.     Scheduled Meds:   piperacillin-tazobactam  3,375 mg IntraVENous Q8H    sodium chloride flush  5-40 mL IntraVENous 2 times per day    enoxaparin  40 mg SubCUTAneous Daily     Continuous Infusions:   sodium chloride 100 mL/hr at 10/25/21 1410    sodium chloride       PRN Meds:.morphine, ketorolac, sodium chloride flush, sodium chloride, ondansetron **OR** ondansetron, polyethylene glycol, acetaminophen **OR** acetaminophen, HYDROmorphone    Prior to Admission medications    Medication Sig Start Date End Date Taking? Authorizing Provider   ciprofloxacin (CIPRO) 500 MG tablet Take 1 tablet by mouth 2 times daily for 7 days 10/18/21 10/25/21 Yes Reilly Morales PA-C   metroNIDAZOLE (FLAGYL) 500 MG tablet Take 1 tablet by mouth 2 times daily for 7 days 10/18/21 10/25/21 Yes Reilly Morales PA-C   ondansetron (ZOFRAN ODT) 4 MG disintegrating tablet Take 1 tablet by mouth every 8 hours as needed for Nausea 10/18/21   Reilly Morales PA-C   sildenafil (REVATIO) 20 MG tablet Take only 1 to 2 pills every 3 days as needed 1/4/21   Marnie Lucio MD        Allergies:  Patient has no known allergies. Social History:   Social History     Socioeconomic History    Marital status:      Spouse name: None    Number of children: None    Years of education: None    Highest education level: None   Occupational History    Occupation: [employer]   Tobacco Use    Smoking status: Current Every Day Smoker     Types: Cigarettes    Smokeless tobacco: Never Used   Vaping Use    Vaping Use: Never used   Substance and Sexual Activity    Alcohol use: Yes    Drug use: Never    Sexual activity: None   Other Topics Concern    None   Social History Narrative    None     Social Determinants of Health     Financial Resource Strain:     Difficulty of Paying Living Expenses:    Food Insecurity:     Worried About Running Out of Food in the Last Year:     [address] in the Last Year:    Transportation Needs:     Lack of Transportation (Medical):      Lack of Transportation (Non-Medical):    Physical Activity:     Days of Exercise per Week:     Minutes of Exercise per Session:    Stress:     Feeling of Stress :    Social Connections:     Frequency of Communication with Friends and Family:     Frequency of Social Gatherings with Friends and Family:     Attends [employer] Services:     Active Member of Clubs or Organizations:     Attends Club or suspected or confirmed emergency medical condition->Emergency Medical Condition (MA) Reason for Exam: abd pain h/o diverticulitis Acuity: Unknown Type of Exam: Unknown FINDINGS: Lower Chest: Lung bases clear Organs: 1.5 cm hyperdense left renal lesion. Low-attenuation of the liver. Remaining solid organs and gallbladder unremarkable GI/Bowel: Colonic diverticula. Persistent inflammatory changes adjacent to the sigmoid colon. New extraluminal bubbles of gas within the perisigmoid inflammatory stranding. 2.5 cm rounded fluid collection adjacent to the lateral margin of the sigmoid without a well-defined wall. No intestinal distension Pelvis: Trace free pelvic fluid. Urinary bladder unremarkable Peritoneum/Retroperitoneum: No ascites or pneumoperitoneum. Normal caliber aorta Bones/Soft Tissues: No bony abnormality     1. Persistent changes of sigmoid diverticulitis, now with findings of locally contained perforation and suspected developing 2.5 cm diverticular abscess 2. 1.5 cm hyperdense left renal lesion. Recommend dedicated renal MRI to assess for mass versus proteinaceous cyst 3. Hepatic steatosis     CT ABDOMEN PELVIS W IV CONTRAST Additional Contrast? None    Result Date: 10/18/2021  EXAMINATION: CT OF THE ABDOMEN AND PELVIS WITH CONTRAST 10/18/2021 6:02 pm TECHNIQUE: CT of the abdomen and pelvis was performed with the administration of intravenous contrast. Multiplanar reformatted images are provided for review. Dose modulation, iterative reconstruction, and/or weight based adjustment of the mA/kV was utilized to reduce the radiation dose to as low as reasonably achievable. COMPARISON: None.  HISTORY: ORDERING SYSTEM PROVIDED HISTORY: RLQ pain TECHNOLOGIST PROVIDED HISTORY: Reason for exam:->RLQ pain Additional Contrast?->None Decision Support Exception - unselect if not a suspected or confirmed emergency medical condition->Emergency Medical Condition (MA) Reason for Exam: RLQ PAIN Acuity: Acute Type of Exam: Initial FINDINGS: Lower Chest: Lung bases clear. Organs: Marked diffuse hepatic steatosis. Unremarkable spleen, pancreas, adrenals, and right kidney. A 1.6 cm cyst in the posterolateral cortex of the left kidney. No definite cholelithiasis. GI/Bowel: Normal appendix. Multiple fluid-filled small bowel and colonic loops, nonspecific finding which can be seen with acute enterocolitis. Distal colonic diverticulosis. Circumferential wall thickening of the mid sigmoid colon with surrounding fat stranding, compatible with acute diverticulitis. Christa Bloodgood Pelvis: Incompletely distended urinary bladder. Normal sized prostate. Bilateral small to moderate-sized hydroceles, partially included. Peritoneum/Retroperitoneum: No free air or free fluid. No abdominal or pelvic abscess. No adenopathy. Minimal vascular calcification. No abdominal aortic aneurysm. Bones/Soft Tissues: Small bone island in the superior endplate of L5. No acute osseous abnormality. Acute sigmoid diverticulitis. No free air or peridiverticular abscess. Multiple fluid-filled small bowel and colonic loops, nonspecific finding which can be seen with acute enterocolitis. Normal appendix. Small to moderate-sized bilateral hydroceles partially included. Cultures:  Relevant cultures documented under results section     Assessment:  Patient Active Problem List   Diagnosis    Other fatigue    Other male erectile dysfunction    Cigarette nicotine dependence without complication    Intra-abdominal abscess (HCC)     Acute sigmoid diverticulitis with contained perforation and abcess  Mild transaminitis  Alcohol use  Tobacco use    Plan:  1. Abdominal exam benign, vital signs stable, no signs of toxicity. Does not need surgical exploration unless condition deteriorates. Abscess is too small for intervention. If does not improve or worsens, will repeat imaging to evaluate for abscess or perforation  2. Bowel rest  3.  Monitor bowel function, passing flatus  4. IVF, monitor and replace electrolytes as needed  5. Antibiotics, switch to PO at discharge  6. Pain medication and antiemetics as needed with caution as may mask worsening exam  7. Trend LFT's, may resolve with hydration, may be secondary to alcohol use  8. Will need elective colonoscopy 6-8 weeks after discharge to screen for other etiologies that may be mimicking diverticulitis  9. Will discuss benefit of sigmoidectomy electively after discharge. If necessary, best chances of successful minimally invasive resection without need for temporary ostomy are with elective procedure  10. Defer management of remainder of other medical conditions to primary and consulting teams    This plan was discussed at length with the patient. He was understanding and in agreement with the plan  Thank you for the consult and allowing me to participate in the care of this patient. I look forward to following him this admission    Kirill Ellison MD, FACS  10/25/2021  3:09 PM

## 2021-10-25 NOTE — H&P
HOSPITALISTS HISTORY AND PHYSICAL    10/25/2021 12:57 PM    Patient Information:  Audra Hameed is a 49 y.o. male [id number]  PCP:  Jessica Nieves MD (Tel: [phone] )    Chief complaint:    Chief Complaint   Patient presents with    Abdominal Pain     Pt in with c/o abd pain x1 week. Pt states was here one week ago for same complaint. Reports no BM for one week        History of Present Illness:  Kain Agarwal is a 49 y.o. male who presented with abdominal pain x1 week. Patient has the onset of symptoms 1 week ago that progressively got worse reports no bowel movement for 1 week. Rates pain dull achy on the lower quadrant. 7 out of 10. Some chills no fever. Patient denies any new recent antibiotics no diarrhea no blood in the stool. REVIEW OF SYSTEMS:   Constitutional: Negative for fever,chills or night sweats  ENT: Negative for rhinorrhea, epistaxis, hoarseness, sore throat. Respiratory: Negative for shortness of breath,wheezing  Cardiovascular: Negative for chest pain, palpitations   Gastrointestinal: Negative for nausea, vomiting, diarrhea  Genitourinary: Negative for polyuria, dysuria   Hematologic/Lymphatic: Negative for bleeding tendency, easy bruising  Musculoskeletal: Negative for myalgias and arthralgias  Neurologic: Negative for confusion,dysarthria. Skin: Negative for itching,rash, good capillary refill. Psychiatric: Negative for depression,anxiety, agitation. Endocrine: Negative for polydipsia,polyuria,heat /cold intolerance. Past Medical History:  htn  Past Surgical History:   has no past surgical history on file. Medications:  No current facility-administered medications on file prior to encounter.      Current Outpatient Medications on File Prior to Encounter   Medication Sig Dispense Refill    metroNIDAZOLE (FLAGYL) 500 MG tablet Take 1 tablet by mouth 2 times daily for 7 days 14 tablet 0    ciprofloxacin (CIPRO) 500 MG tablet Take 1 tablet by mouth 2 times daily for 7 days 14 tablet 0    ondansetron (ZOFRAN ODT) 4 MG disintegrating tablet Take 1 tablet by mouth every 8 hours as needed for Nausea 20 tablet 0    sildenafil (REVATIO) 20 MG tablet Take only 1 to 2 pills every 3 days as needed 20 tablet 1       Allergies:  No Known Allergies     Social History:   reports that he has been smoking cigarettes. He has never used smokeless tobacco. He reports current alcohol use. He reports that he does not use drugs. Family History:  family history includes No Known Problems in his father and mother. ,  Patient    Physical Exam:  BP (!) 159/92   Pulse 63   Temp 99 °F (37.2 °C) (Oral)   Resp 18   Ht 5' 10\" (1.778 m)   Wt 165 lb (74.8 kg)   SpO2 95%   BMI 23.68 kg/m²     General appearance:  Appears comfortable. Well nourished  Eyes: Sclera clear, pupils equal  ENT: Moist mucus membranes, no thrush. Trachea midline. Cardiovascular: Regular rhythm, normal S1, S2. No murmur, gallop, rub. No edema in lower extremities  Respiratory: Clear to auscultation bilaterally, no wheeze, good inspiratory effort  Gastrointestinal: Abdomen soft,ten tender to touch on the left lower quadrant  Musculoskeletal: No cyanosis in digits, neck supple  Neurology: Cranial nerves grossly intact. Alert and oriented in time, place and person. No speech or motor deficits  Psychiatry: Appropriate affect.  Not agitated  Skin: Warm, dry, normal turgor, no rash    Labs:  CBC:   Lab Results   Component Value Date    WBC 15.7 10/25/2021    RBC 4.82 10/25/2021    HGB 15.9 10/25/2021    HCT 46.2 10/25/2021    MCV 95.9 10/25/2021    MCH 33.0 10/25/2021    MCHC 34.5 10/25/2021    RDW 13.9 10/25/2021     10/25/2021    MPV 8.2 10/25/2021     BMP:    Lab Results   Component Value Date     10/25/2021    K 3.7 10/25/2021    K 4.0 10/18/2021    CL 98 10/25/2021    CO2 25 10/25/2021    BUN 4 10/25/2021    CREATININE 0.6 10/25/2021    CALCIUM 9.1 10/25/2021    GFRAA >60 10/25/2021    LABGLOM >60 10/25/2021    GLUCOSE 128 10/25/2021       Chest Xray:   EKG:    I visualized CXR images and EKG strips     Discussed  with     Problem List  Active Problems:    Intra-abdominal abscess (HCC)  Resolved Problems:    * No resolved hospital problems.  *        Assessment/Plan:   Acute diverticulitis with diverticular abscess  -Continue IV antibiotics  -IV fluids IV pain control.  -Diet recommendation per general surgery keep n.p.o. for now until evaluated  -Close monitoring for any worsening abdominal pain  -General surgery was consulted from the ER will follow the recommendation        Robert Cespedes MD    10/25/2021 12:57 PM

## 2021-10-26 LAB
ALBUMIN SERPL-MCNC: 3.2 G/DL (ref 3.4–5)
ALP BLD-CCNC: 118 U/L (ref 40–129)
ALT SERPL-CCNC: 66 U/L (ref 10–40)
ANION GAP SERPL CALCULATED.3IONS-SCNC: 10 MMOL/L (ref 3–16)
APTT: 32.9 SEC (ref 26.2–38.6)
AST SERPL-CCNC: 14 U/L (ref 15–37)
BASOPHILS ABSOLUTE: 0.1 K/UL (ref 0–0.2)
BASOPHILS RELATIVE PERCENT: 0.5 %
BILIRUB SERPL-MCNC: 0.5 MG/DL (ref 0–1)
BILIRUBIN DIRECT: <0.2 MG/DL (ref 0–0.3)
BILIRUBIN, INDIRECT: ABNORMAL MG/DL (ref 0–1)
BUN BLDV-MCNC: 7 MG/DL (ref 7–20)
CALCIUM SERPL-MCNC: 8.4 MG/DL (ref 8.3–10.6)
CEA: 3.8 NG/ML (ref 0–5)
CHLORIDE BLD-SCNC: 101 MMOL/L (ref 99–110)
CO2: 25 MMOL/L (ref 21–32)
CREAT SERPL-MCNC: 0.6 MG/DL (ref 0.9–1.3)
EOSINOPHILS ABSOLUTE: 0.1 K/UL (ref 0–0.6)
EOSINOPHILS RELATIVE PERCENT: 0.3 %
GFR AFRICAN AMERICAN: >60
GFR NON-AFRICAN AMERICAN: >60
GLUCOSE BLD-MCNC: 104 MG/DL (ref 70–99)
HCT VFR BLD CALC: 41.4 % (ref 40.5–52.5)
HEMATOLOGY PATH CONSULT: NORMAL
HEMOGLOBIN: 14 G/DL (ref 13.5–17.5)
INR BLD: 1.36 (ref 0.88–1.12)
LACTIC ACID: 0.7 MMOL/L (ref 0.4–2)
LYMPHOCYTES ABSOLUTE: 1.7 K/UL (ref 1–5.1)
LYMPHOCYTES RELATIVE PERCENT: 9.9 %
MAGNESIUM: 2.3 MG/DL (ref 1.8–2.4)
MCH RBC QN AUTO: 32.9 PG (ref 26–34)
MCHC RBC AUTO-ENTMCNC: 33.9 G/DL (ref 31–36)
MCV RBC AUTO: 97 FL (ref 80–100)
MONOCYTES ABSOLUTE: 1.8 K/UL (ref 0–1.3)
MONOCYTES RELATIVE PERCENT: 10.1 %
NEUTROPHILS ABSOLUTE: 13.8 K/UL (ref 1.7–7.7)
NEUTROPHILS RELATIVE PERCENT: 79.2 %
PDW BLD-RTO: 13.7 % (ref 12.4–15.4)
PHOSPHORUS: 2.5 MG/DL (ref 2.5–4.9)
PLATELET # BLD: 538 K/UL (ref 135–450)
PMV BLD AUTO: 7.9 FL (ref 5–10.5)
POTASSIUM SERPL-SCNC: 3.4 MMOL/L (ref 3.5–5.1)
PREALBUMIN: 12.4 MG/DL (ref 20–40)
PROTHROMBIN TIME: 15.6 SEC (ref 9.9–12.7)
RBC # BLD: 4.27 M/UL (ref 4.2–5.9)
SODIUM BLD-SCNC: 136 MMOL/L (ref 136–145)
TOTAL PROTEIN: 6.9 G/DL (ref 6.4–8.2)
TRANSFERRIN: 138 MG/DL (ref 200–360)
WBC # BLD: 17.4 K/UL (ref 4–11)

## 2021-10-26 PROCEDURE — 82378 CARCINOEMBRYONIC ANTIGEN: CPT

## 2021-10-26 PROCEDURE — 6360000002 HC RX W HCPCS: Performed by: HOSPITALIST

## 2021-10-26 PROCEDURE — 85610 PROTHROMBIN TIME: CPT

## 2021-10-26 PROCEDURE — 85025 COMPLETE CBC W/AUTO DIFF WBC: CPT

## 2021-10-26 PROCEDURE — 80048 BASIC METABOLIC PNL TOTAL CA: CPT

## 2021-10-26 PROCEDURE — 84134 ASSAY OF PREALBUMIN: CPT

## 2021-10-26 PROCEDURE — 83605 ASSAY OF LACTIC ACID: CPT

## 2021-10-26 PROCEDURE — 36415 COLL VENOUS BLD VENIPUNCTURE: CPT

## 2021-10-26 PROCEDURE — APPSS15 APP SPLIT SHARED TIME 0-15 MINUTES: Performed by: NURSE PRACTITIONER

## 2021-10-26 PROCEDURE — 6360000002 HC RX W HCPCS: Performed by: PHYSICIAN ASSISTANT

## 2021-10-26 PROCEDURE — 6360000002 HC RX W HCPCS: Performed by: NURSE PRACTITIONER

## 2021-10-26 PROCEDURE — 84466 ASSAY OF TRANSFERRIN: CPT

## 2021-10-26 PROCEDURE — 84100 ASSAY OF PHOSPHORUS: CPT

## 2021-10-26 PROCEDURE — 1200000000 HC SEMI PRIVATE

## 2021-10-26 PROCEDURE — 99232 SBSQ HOSP IP/OBS MODERATE 35: CPT | Performed by: SURGERY

## 2021-10-26 PROCEDURE — 80076 HEPATIC FUNCTION PANEL: CPT

## 2021-10-26 PROCEDURE — 83735 ASSAY OF MAGNESIUM: CPT

## 2021-10-26 PROCEDURE — APPNB30 APP NON BILLABLE TIME 0-30 MINS: Performed by: NURSE PRACTITIONER

## 2021-10-26 PROCEDURE — 6370000000 HC RX 637 (ALT 250 FOR IP): Performed by: PHYSICIAN ASSISTANT

## 2021-10-26 PROCEDURE — 85730 THROMBOPLASTIN TIME PARTIAL: CPT

## 2021-10-26 PROCEDURE — 2580000003 HC RX 258: Performed by: HOSPITALIST

## 2021-10-26 PROCEDURE — 6360000002 HC RX W HCPCS: Performed by: INTERNAL MEDICINE

## 2021-10-26 RX ORDER — FLUCONAZOLE 2 MG/ML
200 INJECTION, SOLUTION INTRAVENOUS EVERY 24 HOURS
Status: DISCONTINUED | OUTPATIENT
Start: 2021-10-26 | End: 2021-10-31 | Stop reason: HOSPADM

## 2021-10-26 RX ORDER — HYDROMORPHONE HYDROCHLORIDE 1 MG/ML
1 INJECTION, SOLUTION INTRAMUSCULAR; INTRAVENOUS; SUBCUTANEOUS
Status: DISCONTINUED | OUTPATIENT
Start: 2021-10-26 | End: 2021-10-27

## 2021-10-26 RX ORDER — POTASSIUM CHLORIDE 20 MEQ/1
40 TABLET, EXTENDED RELEASE ORAL ONCE
Status: COMPLETED | OUTPATIENT
Start: 2021-10-26 | End: 2021-10-26

## 2021-10-26 RX ADMIN — KETOROLAC TROMETHAMINE 15 MG: 15 INJECTION, SOLUTION INTRAMUSCULAR; INTRAVENOUS at 07:08

## 2021-10-26 RX ADMIN — LORAZEPAM 0.5 MG: 2 INJECTION INTRAMUSCULAR; INTRAVENOUS at 14:35

## 2021-10-26 RX ADMIN — FLUCONAZOLE 200 MG: 200 INJECTION, SOLUTION INTRAVENOUS at 15:41

## 2021-10-26 RX ADMIN — LORAZEPAM 0.5 MG: 2 INJECTION INTRAMUSCULAR; INTRAVENOUS at 00:27

## 2021-10-26 RX ADMIN — Medication 10 ML: at 08:21

## 2021-10-26 RX ADMIN — HYDROMORPHONE HYDROCHLORIDE 1 MG: 1 INJECTION, SOLUTION INTRAMUSCULAR; INTRAVENOUS; SUBCUTANEOUS at 04:19

## 2021-10-26 RX ADMIN — HYDROMORPHONE HYDROCHLORIDE 1 MG: 1 INJECTION, SOLUTION INTRAMUSCULAR; INTRAVENOUS; SUBCUTANEOUS at 17:34

## 2021-10-26 RX ADMIN — ENOXAPARIN SODIUM 40 MG: 40 INJECTION SUBCUTANEOUS at 10:07

## 2021-10-26 RX ADMIN — LORAZEPAM 0.5 MG: 2 INJECTION INTRAMUSCULAR; INTRAVENOUS at 20:34

## 2021-10-26 RX ADMIN — POTASSIUM CHLORIDE 40 MEQ: 20 TABLET, EXTENDED RELEASE ORAL at 10:07

## 2021-10-26 RX ADMIN — HYDROMORPHONE HYDROCHLORIDE 1 MG: 1 INJECTION, SOLUTION INTRAMUSCULAR; INTRAVENOUS; SUBCUTANEOUS at 14:35

## 2021-10-26 RX ADMIN — PIPERACILLIN AND TAZOBACTAM 3375 MG: 3; .375 INJECTION, POWDER, LYOPHILIZED, FOR SOLUTION INTRAVENOUS at 10:12

## 2021-10-26 RX ADMIN — HYDROMORPHONE HYDROCHLORIDE 1 MG: 1 INJECTION, SOLUTION INTRAMUSCULAR; INTRAVENOUS; SUBCUTANEOUS at 20:34

## 2021-10-26 RX ADMIN — HYDROMORPHONE HYDROCHLORIDE 1 MG: 1 INJECTION, SOLUTION INTRAMUSCULAR; INTRAVENOUS; SUBCUTANEOUS at 11:39

## 2021-10-26 RX ADMIN — HYDROMORPHONE HYDROCHLORIDE 1 MG: 1 INJECTION, SOLUTION INTRAMUSCULAR; INTRAVENOUS; SUBCUTANEOUS at 08:19

## 2021-10-26 RX ADMIN — LORAZEPAM 0.5 MG: 2 INJECTION INTRAMUSCULAR; INTRAVENOUS at 08:19

## 2021-10-26 RX ADMIN — PIPERACILLIN AND TAZOBACTAM 3375 MG: 3; .375 INJECTION, POWDER, LYOPHILIZED, FOR SOLUTION INTRAVENOUS at 17:41

## 2021-10-26 RX ADMIN — SODIUM CHLORIDE: 9 INJECTION, SOLUTION INTRAVENOUS at 10:01

## 2021-10-26 RX ADMIN — PIPERACILLIN AND TAZOBACTAM 3375 MG: 3; .375 INJECTION, POWDER, LYOPHILIZED, FOR SOLUTION INTRAVENOUS at 00:34

## 2021-10-26 RX ADMIN — KETOROLAC TROMETHAMINE 15 MG: 15 INJECTION, SOLUTION INTRAMUSCULAR; INTRAVENOUS at 00:33

## 2021-10-26 RX ADMIN — Medication 10 ML: at 20:34

## 2021-10-26 ASSESSMENT — PAIN SCALES - GENERAL
PAINLEVEL_OUTOF10: 9
PAINLEVEL_OUTOF10: 8
PAINLEVEL_OUTOF10: 7
PAINLEVEL_OUTOF10: 9
PAINLEVEL_OUTOF10: 7
PAINLEVEL_OUTOF10: 8
PAINLEVEL_OUTOF10: 9
PAINLEVEL_OUTOF10: 9

## 2021-10-26 ASSESSMENT — PAIN DESCRIPTION - DESCRIPTORS: DESCRIPTORS: SPASM

## 2021-10-26 ASSESSMENT — PAIN DESCRIPTION - ORIENTATION
ORIENTATION: LOWER

## 2021-10-26 ASSESSMENT — PAIN DESCRIPTION - LOCATION
LOCATION: ABDOMEN
LOCATION: ABDOMEN;BACK
LOCATION: ABDOMEN;BACK

## 2021-10-26 ASSESSMENT — PAIN DESCRIPTION - PAIN TYPE
TYPE: ACUTE PAIN

## 2021-10-26 NOTE — PROGRESS NOTES
[facility] PROGRESS NOTE    10/26/2021 9:08 AM        Name: Valentina Zaman . Admitted: 10/25/2021  Primary Care Provider: Marizol Matos MD (Tel: [phone])      Subjective:  . Patient seen this am. Had dose of dilaudid and ativan and was resting. Wakes easily. He does feel better slightly since admission-even when paid meds wear off. No nausea. No appetite. Denies tremors, anxiety. Reviewed interval ancillary notes    Current Medications  potassium chloride (KLOR-CON M) extended release tablet 40 mEq, Once  piperacillin-tazobactam (ZOSYN) 3,375 mg in dextrose 5 % 50 mL IVPB extended infusion (mini-bag), Q8H  0.9 % sodium chloride infusion, Continuous  morphine (PF) injection 2 mg, Q4H PRN  ketorolac (TORADOL) injection 15 mg, Q6H PRN  sodium chloride flush 0.9 % injection 5-40 mL, 2 times per day  sodium chloride flush 0.9 % injection 5-40 mL, PRN  0.9 % sodium chloride infusion, PRN  enoxaparin (LOVENOX) injection 40 mg, Daily  ondansetron (ZOFRAN-ODT) disintegrating tablet 4 mg, Q6H PRN   Or  ondansetron (ZOFRAN) injection 4 mg, Q6H PRN  polyethylene glycol (GLYCOLAX) packet 17 g, Daily PRN  acetaminophen (TYLENOL) tablet 650 mg, Q6H PRN   Or  acetaminophen (TYLENOL) suppository 650 mg, Q6H PRN  HYDROmorphone HCl PF (DILAUDID) injection 1 mg, Q4H PRN  LORazepam (ATIVAN) injection 0.5 mg, Q6H PRN        Objective:  /83   Pulse 67   Temp 99.1 °F (37.3 °C) (Oral)   Resp 16   Ht 5' 10\" (1.778 m)   Wt 165 lb 2 oz (74.9 kg)   SpO2 95%   BMI 23.69 kg/m²   No intake or output data in the 24 hours ending 10/26/21 0908   Wt Readings from Last 3 Encounters:   10/25/21 165 lb 2 oz (74.9 kg)   01/04/21 173 lb 3.2 oz (78.6 kg)       General appearance:  Appears comfortable  Eyes: Sclera clear. Pupils equal.  ENT: Moist oral mucosa. Trachea midline, no adenopathy.   Cardiovascular: Regular rhythm, normal S1, S2. No murmur. No edema in lower extremities  Respiratory: Not using accessory muscles. Good inspiratory effort. Clear to auscultation bilaterally, no wheeze or crackles. GI: Abdomen soft, no tenderness, not distended, normal bowel sounds  Musculoskeletal: No cyanosis in digits, neck supple  Neurology: CN 2-12 grossly intact. No speech or motor deficits  Psych: Normal affect. Alert and oriented in time, place and person  Skin: Warm, dry, normal turgor    Labs and Tests:  CBC:   Recent Labs     10/25/21  0755 10/26/21  0709   WBC 15.7* 17.4*   HGB 15.9 14.0   * 538*     BMP:    Recent Labs     10/25/21  0755 10/26/21  0709   * 136   K 3.7 3.4*   CL 98* 101   CO2 25 25   BUN 4* 7   CREATININE 0.6* 0.6*   GLUCOSE 128* 104*     Hepatic:   Recent Labs     10/25/21  0755 10/26/21  0709   AST 21 14*   * 66*   BILITOT 0.6 0.5   ALKPHOS 134* 118     CT abd/pelvis  1. Persistent changes of sigmoid diverticulitis, now with findings of locally   contained perforation and suspected developing 2.5 cm diverticular abscess   2. 1.5 cm hyperdense left renal lesion.  Recommend dedicated renal MRI to   assess for mass versus proteinaceous cyst   3. Hepatic steatosis         Problem List  Active Problems:    Intra-abdominal abscess ([address]    Acute diverticulitis  Resolved Problems:    * No resolved hospital problems. *       Assessment & Plan:   1. Diverticulitis with small abscess-on zosyn, will add diflucan. WBC up some from yesterday to 17K. Surgery on board. May need repeat imaging in the coming days if no improvement. 2. Alcoholism-patient has not had any alcohol the week prior to coming in. Has ativan prn. Counseled.        Diet: Diet NPO Exceptions are: Ice Chips, Sips of Water with Meds  Code:Full Code  DVT PPX: nikki Valenzuela PA-C   10/26/2021 9:08 AM

## 2021-10-26 NOTE — PROGRESS NOTES
Dilaudid changed to q3hr. Pt pain level 9/10, PRN dilaudid given. Pt resting in bed. No further needs expressed.

## 2021-10-26 NOTE — PROGRESS NOTES
[facility]        Progress Note    Patient Name: Valentina Zaman  MRN: [id number]  YOB: 1970  Date of Evaluation: 10/26/2021    Chief Complaint: Abdominal pain    Subjective:  No acute events overnight  Pain slightly increased today, felt more on right side/radiating  No nausea or vomiting  Passing flatus, no BM  Ambulating around room at this time      Vital Signs:  Patient Vitals for the past 24 hrs:   BP Temp Temp src Pulse Resp SpO2 Height Weight   10/26/21 1002 (!) 147/89 98.1 °F (36.7 °C) Oral 64 14 96 % -- --   10/26/21 0417 137/83 99.1 °F (37.3 °C) Oral 67 16 95 % -- --   10/26/21 0035 128/76 98.7 °F (37.1 °C) Oral 70 16 94 % -- --   10/25/21 2045 (!) 154/89 99.5 °F (37.5 °C) Oral 82 18 94 % -- --   10/25/21 1822 -- -- -- -- -- 100 % -- --   10/25/21 1726 (!) 152/87 99.1 °F (37.3 °C) Oral 72 18 94 % -- --   10/25/21 1415 -- -- -- -- -- -- 5' 10\" (1.778 m) 165 lb 2 oz (74.9 kg)   10/25/21 1302 -- -- -- -- -- 95 % -- --   10/25/21 1213 (!) 159/92 99 °F (37.2 °C) Oral 63 18 95 % -- --   10/25/21 1130 129/83 -- -- -- -- 93 % -- --      TEMPERATURE HISTORY 24H: Temp (24hrs), Av.9 °F (37.2 °C), Min:98.1 °F (36.7 °C), Max:99.5 °F (37.5 °C)    BLOOD PRESSURE HISTORY: Systolic (32XCB), BDT:588 , Min:127 , UIK:231    Diastolic (58XZC), YHS:72, Min:76, Max:101      Intake/Output:  No intake/output data recorded. No intake/output data recorded.   Drain/tube Output:       Physical Exam:  General: awake, alert, oriented to  person, place, time  Cardiovascular:  regular rate and rhythm and no murmur noted  Lungs: clear to auscultation  Abdomen: soft, non-distended, tenderness most focal in left lower quadrant/suprapubic region, mild in the right lower quadrant, bowel sounds present     Labs:  CBC:    Recent Labs     10/25/21  0755 10/26/21  0709   WBC 15.7* 17.4*   HGB 15.9 14.0   HCT 46.2 41.4   * 538*     BMP:    Recent Labs     10/25/21  0755 10/26/21  0709   * 136   K 3.7 3.4*   CL 98* 101   CO2 25 25   BUN 4* 7   CREATININE 0.6* 0.6*   GLUCOSE 128* 104*     Hepatic:    Recent Labs     10/25/21  0755 10/26/21  0709   AST 21 14*   * 66*   BILITOT 0.6 0.5   ALKPHOS 134* 118     Amylase:  No results found for: AMYLASE  Lipase:    Lab Results   Component Value Date    LIPASE 30.0 10/25/2021    LIPASE 43.0 10/18/2021    LIPASE 40.0 10/18/2021      Mag:    Lab Results   Component Value Date    MG 2.30 10/26/2021     Phos:     Lab Results   Component Value Date    PHOS 2.5 10/26/2021      Coags:   Lab Results   Component Value Date    PROTIME 15.6 10/26/2021    INR 1.36 10/26/2021    APTT 32.9 10/26/2021       Cultures:  Anaerobic culture  No results found for: LABANAE  Fungus stain  No results found for requested labs within last 30 days. Gram stain  No results found for requested labs within last 30 days. Organism  No results found for: [facility]  Surgical culture  No results found for: CXSURG  Blood culture 1  No results found for requested labs within last 30 days. Blood culture 2  No results found for requested labs within last 30 days. Fecal occult  No results found for requested labs within last 30 days. GI bacterial pathogens by PCR  No results found for requested labs within last 30 days. C. difficile  No results found for requested labs within last 30 days. Urine culture  No results found for: LABURIN    Pathology:  No relevant pathology     Imaging:  I have personally reviewed the following films:    CT ABDOMEN PELVIS W IV CONTRAST Additional Contrast? None    Result Date: 10/25/2021  EXAMINATION: CT OF THE ABDOMEN AND PELVIS WITH CONTRAST 10/25/2021 9:01 am TECHNIQUE: CT of the abdomen and pelvis was performed with the administration of intravenous contrast. Multiplanar reformatted images are provided for review.  Dose modulation, iterative reconstruction, and/or weight based adjustment of the mA/kV was utilized to reduce the radiation dose to as low as reasonably achievable. COMPARISON: 10/18/2021 HISTORY: ORDERING SYSTEM PROVIDED HISTORY: abd pain h/o diverticulitis TECHNOLOGIST PROVIDED HISTORY: Reason for exam:->abd pain h/o diverticulitis Additional Contrast?->None Decision Support Exception - unselect if not a suspected or confirmed emergency medical condition->Emergency Medical Condition (MA) Reason for Exam: abd pain h/o diverticulitis Acuity: Unknown Type of Exam: Unknown FINDINGS: Lower Chest: Lung bases clear Organs: 1.5 cm hyperdense left renal lesion. Low-attenuation of the liver. Remaining solid organs and gallbladder unremarkable GI/Bowel: Colonic diverticula. Persistent inflammatory changes adjacent to the sigmoid colon. New extraluminal bubbles of gas within the perisigmoid inflammatory stranding. 2.5 cm rounded fluid collection adjacent to the lateral margin of the sigmoid without a well-defined wall. No intestinal distension Pelvis: Trace free pelvic fluid. Urinary bladder unremarkable Peritoneum/Retroperitoneum: No ascites or pneumoperitoneum. Normal caliber aorta Bones/Soft Tissues: No bony abnormality     1. Persistent changes of sigmoid diverticulitis, now with findings of locally contained perforation and suspected developing 2.5 cm diverticular abscess 2. 1.5 cm hyperdense left renal lesion. Recommend dedicated renal MRI to assess for mass versus proteinaceous cyst 3. Hepatic steatosis     CT ABDOMEN PELVIS W IV CONTRAST Additional Contrast? None    Result Date: 10/18/2021  EXAMINATION: CT OF THE ABDOMEN AND PELVIS WITH CONTRAST 10/18/2021 6:02 pm TECHNIQUE: CT of the abdomen and pelvis was performed with the administration of intravenous contrast. Multiplanar reformatted images are provided for review. Dose modulation, iterative reconstruction, and/or weight based adjustment of the mA/kV was utilized to reduce the radiation dose to as low as reasonably achievable. COMPARISON: None.  HISTORY: ORDERING SYSTEM PROVIDED HISTORY: RLQ pain TECHNOLOGIST PROVIDED HISTORY: Reason for exam:->RLQ pain Additional Contrast?->None Decision Support Exception - unselect if not a suspected or confirmed emergency medical condition->Emergency Medical Condition (MA) Reason for Exam: RLQ PAIN Acuity: Acute Type of Exam: Initial FINDINGS: Lower Chest: Lung bases clear. Organs: Marked diffuse hepatic steatosis. Unremarkable spleen, pancreas, adrenals, and right kidney. A 1.6 cm cyst in the posterolateral cortex of the left kidney. No definite cholelithiasis. GI/Bowel: Normal appendix. Multiple fluid-filled small bowel and colonic loops, nonspecific finding which can be seen with acute enterocolitis. Distal colonic diverticulosis. Circumferential wall thickening of the mid sigmoid colon with surrounding fat stranding, compatible with acute diverticulitis. Leonard Culbertson Pelvis: Incompletely distended urinary bladder. Normal sized prostate. Bilateral small to moderate-sized hydroceles, partially included. Peritoneum/Retroperitoneum: No free air or free fluid. No abdominal or pelvic abscess. No adenopathy. Minimal vascular calcification. No abdominal aortic aneurysm. Bones/Soft Tissues: Small bone island in the superior endplate of L5. No acute osseous abnormality. Acute sigmoid diverticulitis. No free air or peridiverticular abscess. Multiple fluid-filled small bowel and colonic loops, nonspecific finding which can be seen with acute enterocolitis. Normal appendix. Small to moderate-sized bilateral hydroceles partially included. Scheduled Meds:   piperacillin-tazobactam  3,375 mg IntraVENous Q8H    sodium chloride flush  5-40 mL IntraVENous 2 times per day    enoxaparin  40 mg SubCUTAneous Daily     Continuous Infusions:   sodium chloride 100 mL/hr at 10/26/21 1001    sodium chloride       PRN Meds:. HYDROmorphone, morphine, ketorolac, sodium chloride flush, sodium chloride, ondansetron **OR** ondansetron, Trend LFT's, may be secondary to alcohol use  8. Will need elective colonoscopy 6-8 weeks after discharge to screen for other etiologies that may be mimicking diverticulitis  9. Will discuss benefit of sigmoidectomy electively after discharge. If necessary, best chances of successful minimally invasive resection without need for temporary ostomy are with elective procedure  10. Defer management of remainder of other medical conditions to primary and consulting teams    Kirill Wren MD, FACS  10/26/2021  3:27 PM

## 2021-10-27 ENCOUNTER — ANESTHESIA EVENT (OUTPATIENT)
Dept: OPERATING ROOM | Age: 51
DRG: 853 | End: 2021-10-27
Payer: COMMERCIAL

## 2021-10-27 ENCOUNTER — ANESTHESIA (OUTPATIENT)
Dept: OPERATING ROOM | Age: 51
DRG: 853 | End: 2021-10-27
Payer: COMMERCIAL

## 2021-10-27 ENCOUNTER — APPOINTMENT (OUTPATIENT)
Dept: CT IMAGING | Age: 51
DRG: 853 | End: 2021-10-27
Payer: COMMERCIAL

## 2021-10-27 VITALS
DIASTOLIC BLOOD PRESSURE: 72 MMHG | TEMPERATURE: 98.2 F | RESPIRATION RATE: 5 BRPM | SYSTOLIC BLOOD PRESSURE: 101 MMHG | OXYGEN SATURATION: 100 %

## 2021-10-27 LAB
ANION GAP SERPL CALCULATED.3IONS-SCNC: 11 MMOL/L (ref 3–16)
BASOPHILS ABSOLUTE: 0.1 K/UL (ref 0–0.2)
BASOPHILS RELATIVE PERCENT: 0.7 %
BUN BLDV-MCNC: 12 MG/DL (ref 7–20)
CALCIUM SERPL-MCNC: 8.3 MG/DL (ref 8.3–10.6)
CHLORIDE BLD-SCNC: 102 MMOL/L (ref 99–110)
CO2: 23 MMOL/L (ref 21–32)
CREAT SERPL-MCNC: 0.5 MG/DL (ref 0.9–1.3)
EOSINOPHILS ABSOLUTE: 0 K/UL (ref 0–0.6)
EOSINOPHILS RELATIVE PERCENT: 0.3 %
GFR AFRICAN AMERICAN: >60
GFR NON-AFRICAN AMERICAN: >60
GLUCOSE BLD-MCNC: 100 MG/DL (ref 70–99)
HCT VFR BLD CALC: 39.1 % (ref 40.5–52.5)
HEMOGLOBIN: 13.4 G/DL (ref 13.5–17.5)
LACTIC ACID: 0.9 MMOL/L (ref 0.4–2)
LYMPHOCYTES ABSOLUTE: 1.5 K/UL (ref 1–5.1)
LYMPHOCYTES RELATIVE PERCENT: 8.7 %
MCH RBC QN AUTO: 32.8 PG (ref 26–34)
MCHC RBC AUTO-ENTMCNC: 34.2 G/DL (ref 31–36)
MCV RBC AUTO: 96.2 FL (ref 80–100)
MONOCYTES ABSOLUTE: 1.6 K/UL (ref 0–1.3)
MONOCYTES RELATIVE PERCENT: 9.3 %
NEUTROPHILS ABSOLUTE: 14.3 K/UL (ref 1.7–7.7)
NEUTROPHILS RELATIVE PERCENT: 81 %
PDW BLD-RTO: 13.7 % (ref 12.4–15.4)
PLATELET # BLD: 518 K/UL (ref 135–450)
PMV BLD AUTO: 7.6 FL (ref 5–10.5)
POTASSIUM SERPL-SCNC: 3.7 MMOL/L (ref 3.5–5.1)
RBC # BLD: 4.07 M/UL (ref 4.2–5.9)
SARS-COV-2, NAAT: NOT DETECTED
SODIUM BLD-SCNC: 136 MMOL/L (ref 136–145)
WBC # BLD: 17.7 K/UL (ref 4–11)

## 2021-10-27 PROCEDURE — 88307 TISSUE EXAM BY PATHOLOGIST: CPT

## 2021-10-27 PROCEDURE — 6360000002 HC RX W HCPCS: Performed by: INTERNAL MEDICINE

## 2021-10-27 PROCEDURE — 80048 BASIC METABOLIC PNL TOTAL CA: CPT

## 2021-10-27 PROCEDURE — 6360000002 HC RX W HCPCS: Performed by: NURSE PRACTITIONER

## 2021-10-27 PROCEDURE — 6360000002 HC RX W HCPCS: Performed by: SURGERY

## 2021-10-27 PROCEDURE — 2720000010 HC SURG SUPPLY STERILE: Performed by: SURGERY

## 2021-10-27 PROCEDURE — 7100000001 HC PACU RECOVERY - ADDTL 15 MIN: Performed by: SURGERY

## 2021-10-27 PROCEDURE — 2709999900 HC NON-CHARGEABLE SUPPLY: Performed by: SURGERY

## 2021-10-27 PROCEDURE — 6360000002 HC RX W HCPCS: Performed by: NURSE ANESTHETIST, CERTIFIED REGISTERED

## 2021-10-27 PROCEDURE — 3700000001 HC ADD 15 MINUTES (ANESTHESIA): Performed by: SURGERY

## 2021-10-27 PROCEDURE — 2580000003 HC RX 258: Performed by: HOSPITALIST

## 2021-10-27 PROCEDURE — 83605 ASSAY OF LACTIC ACID: CPT

## 2021-10-27 PROCEDURE — 2500000003 HC RX 250 WO HCPCS: Performed by: NURSE ANESTHETIST, CERTIFIED REGISTERED

## 2021-10-27 PROCEDURE — 6360000002 HC RX W HCPCS: Performed by: FAMILY MEDICINE

## 2021-10-27 PROCEDURE — 6370000000 HC RX 637 (ALT 250 FOR IP): Performed by: NURSE PRACTITIONER

## 2021-10-27 PROCEDURE — 3700000000 HC ANESTHESIA ATTENDED CARE: Performed by: SURGERY

## 2021-10-27 PROCEDURE — 74177 CT ABD & PELVIS W/CONTRAST: CPT

## 2021-10-27 PROCEDURE — 87102 FUNGUS ISOLATION CULTURE: CPT

## 2021-10-27 PROCEDURE — 99255 IP/OBS CONSLTJ NEW/EST HI 80: CPT | Performed by: INTERNAL MEDICINE

## 2021-10-27 PROCEDURE — 87116 MYCOBACTERIA CULTURE: CPT

## 2021-10-27 PROCEDURE — 3600000004 HC SURGERY LEVEL 4 BASE: Performed by: SURGERY

## 2021-10-27 PROCEDURE — 87206 SMEAR FLUORESCENT/ACID STAI: CPT

## 2021-10-27 PROCEDURE — 6360000002 HC RX W HCPCS: Performed by: PHYSICIAN ASSISTANT

## 2021-10-27 PROCEDURE — 3600000014 HC SURGERY LEVEL 4 ADDTL 15MIN: Performed by: SURGERY

## 2021-10-27 PROCEDURE — 6360000002 HC RX W HCPCS: Performed by: HOSPITALIST

## 2021-10-27 PROCEDURE — 44146 PARTIAL REMOVAL OF COLON: CPT | Performed by: SURGERY

## 2021-10-27 PROCEDURE — 1200000000 HC SEMI PRIVATE

## 2021-10-27 PROCEDURE — 44139 MOBILIZATION OF COLON: CPT | Performed by: SURGERY

## 2021-10-27 PROCEDURE — 87015 SPECIMEN INFECT AGNT CONCNTJ: CPT

## 2021-10-27 PROCEDURE — 36415 COLL VENOUS BLD VENIPUNCTURE: CPT

## 2021-10-27 PROCEDURE — 85025 COMPLETE CBC W/AUTO DIFF WBC: CPT

## 2021-10-27 PROCEDURE — 2580000003 HC RX 258: Performed by: SURGERY

## 2021-10-27 PROCEDURE — 6360000004 HC RX CONTRAST MEDICATION: Performed by: INTERNAL MEDICINE

## 2021-10-27 PROCEDURE — 7100000000 HC PACU RECOVERY - FIRST 15 MIN: Performed by: SURGERY

## 2021-10-27 PROCEDURE — 87040 BLOOD CULTURE FOR BACTERIA: CPT

## 2021-10-27 PROCEDURE — 87205 SMEAR GRAM STAIN: CPT

## 2021-10-27 PROCEDURE — 6360000004 HC RX CONTRAST MEDICATION

## 2021-10-27 PROCEDURE — 87635 SARS-COV-2 COVID-19 AMP PRB: CPT

## 2021-10-27 PROCEDURE — C1729 CATH, DRAINAGE: HCPCS | Performed by: SURGERY

## 2021-10-27 PROCEDURE — APPNB30 APP NON BILLABLE TIME 0-30 MINS: Performed by: NURSE PRACTITIONER

## 2021-10-27 PROCEDURE — 87070 CULTURE OTHR SPECIMN AEROBIC: CPT

## 2021-10-27 PROCEDURE — APPSS15 APP SPLIT SHARED TIME 0-15 MINUTES: Performed by: NURSE PRACTITIONER

## 2021-10-27 PROCEDURE — 87075 CULTR BACTERIA EXCEPT BLOOD: CPT

## 2021-10-27 RX ORDER — ONDANSETRON 2 MG/ML
INJECTION INTRAMUSCULAR; INTRAVENOUS PRN
Status: DISCONTINUED | OUTPATIENT
Start: 2021-10-27 | End: 2021-10-27 | Stop reason: SDUPTHER

## 2021-10-27 RX ORDER — LIDOCAINE HYDROCHLORIDE 20 MG/ML
INJECTION, SOLUTION EPIDURAL; INFILTRATION; INTRACAUDAL; PERINEURAL PRN
Status: DISCONTINUED | OUTPATIENT
Start: 2021-10-27 | End: 2021-10-27 | Stop reason: SDUPTHER

## 2021-10-27 RX ORDER — HYDROMORPHONE HCL 110MG/55ML
0.25 PATIENT CONTROLLED ANALGESIA SYRINGE INTRAVENOUS EVERY 5 MIN PRN
Status: DISCONTINUED | OUTPATIENT
Start: 2021-10-27 | End: 2021-10-27

## 2021-10-27 RX ORDER — DIPHENHYDRAMINE HCL 25 MG
25 TABLET ORAL EVERY 6 HOURS PRN
Status: COMPLETED | OUTPATIENT
Start: 2021-10-27 | End: 2021-10-28

## 2021-10-27 RX ORDER — ROCURONIUM BROMIDE 10 MG/ML
INJECTION, SOLUTION INTRAVENOUS PRN
Status: DISCONTINUED | OUTPATIENT
Start: 2021-10-27 | End: 2021-10-27 | Stop reason: SDUPTHER

## 2021-10-27 RX ORDER — PROMETHAZINE HYDROCHLORIDE 25 MG/1
12.5 TABLET ORAL EVERY 6 HOURS PRN
Status: DISCONTINUED | OUTPATIENT
Start: 2021-10-27 | End: 2021-10-31 | Stop reason: HOSPADM

## 2021-10-27 RX ORDER — KETAMINE HCL IN NACL, ISO-OSM 100MG/10ML
SYRINGE (ML) INJECTION PRN
Status: DISCONTINUED | OUTPATIENT
Start: 2021-10-27 | End: 2021-10-27 | Stop reason: SDUPTHER

## 2021-10-27 RX ORDER — DIPHENHYDRAMINE HYDROCHLORIDE 50 MG/ML
12.5 INJECTION INTRAMUSCULAR; INTRAVENOUS
Status: DISCONTINUED | OUTPATIENT
Start: 2021-10-27 | End: 2021-10-27

## 2021-10-27 RX ORDER — ONDANSETRON 2 MG/ML
4 INJECTION INTRAMUSCULAR; INTRAVENOUS EVERY 6 HOURS PRN
Status: DISCONTINUED | OUTPATIENT
Start: 2021-10-27 | End: 2021-10-31 | Stop reason: HOSPADM

## 2021-10-27 RX ORDER — HYDROMORPHONE HYDROCHLORIDE 1 MG/ML
1 INJECTION, SOLUTION INTRAMUSCULAR; INTRAVENOUS; SUBCUTANEOUS
Status: DISCONTINUED | OUTPATIENT
Start: 2021-10-27 | End: 2021-10-31 | Stop reason: HOSPADM

## 2021-10-27 RX ORDER — MIDAZOLAM HYDROCHLORIDE 1 MG/ML
INJECTION INTRAMUSCULAR; INTRAVENOUS PRN
Status: DISCONTINUED | OUTPATIENT
Start: 2021-10-27 | End: 2021-10-27 | Stop reason: SDUPTHER

## 2021-10-27 RX ORDER — MORPHINE SULFATE 2 MG/ML
2 INJECTION, SOLUTION INTRAMUSCULAR; INTRAVENOUS
Status: DISCONTINUED | OUTPATIENT
Start: 2021-10-27 | End: 2021-10-31 | Stop reason: HOSPADM

## 2021-10-27 RX ORDER — MORPHINE SULFATE 2 MG/ML
4 INJECTION, SOLUTION INTRAMUSCULAR; INTRAVENOUS
Status: DISCONTINUED | OUTPATIENT
Start: 2021-10-27 | End: 2021-10-31 | Stop reason: HOSPADM

## 2021-10-27 RX ORDER — HYDROMORPHONE HCL 110MG/55ML
PATIENT CONTROLLED ANALGESIA SYRINGE INTRAVENOUS PRN
Status: DISCONTINUED | OUTPATIENT
Start: 2021-10-27 | End: 2021-10-27 | Stop reason: SDUPTHER

## 2021-10-27 RX ORDER — MAGNESIUM SULFATE HEPTAHYDRATE 500 MG/ML
INJECTION, SOLUTION INTRAMUSCULAR; INTRAVENOUS PRN
Status: DISCONTINUED | OUTPATIENT
Start: 2021-10-27 | End: 2021-10-27 | Stop reason: SDUPTHER

## 2021-10-27 RX ORDER — SODIUM CHLORIDE 9 MG/ML
INJECTION, SOLUTION INTRAVENOUS CONTINUOUS
Status: DISCONTINUED | OUTPATIENT
Start: 2021-10-27 | End: 2021-10-29

## 2021-10-27 RX ORDER — ACETAMINOPHEN 325 MG/1
650 TABLET ORAL EVERY 6 HOURS
Status: DISCONTINUED | OUTPATIENT
Start: 2021-10-27 | End: 2021-10-31 | Stop reason: HOSPADM

## 2021-10-27 RX ORDER — MEPERIDINE HYDROCHLORIDE 25 MG/ML
12.5 INJECTION INTRAMUSCULAR; INTRAVENOUS; SUBCUTANEOUS EVERY 5 MIN PRN
Status: DISCONTINUED | OUTPATIENT
Start: 2021-10-27 | End: 2021-10-27

## 2021-10-27 RX ORDER — PROPOFOL 10 MG/ML
INJECTION, EMULSION INTRAVENOUS PRN
Status: DISCONTINUED | OUTPATIENT
Start: 2021-10-27 | End: 2021-10-27 | Stop reason: SDUPTHER

## 2021-10-27 RX ORDER — PROMETHAZINE HYDROCHLORIDE 25 MG/ML
6.25 INJECTION, SOLUTION INTRAMUSCULAR; INTRAVENOUS PRN
Status: DISCONTINUED | OUTPATIENT
Start: 2021-10-27 | End: 2021-10-27

## 2021-10-27 RX ORDER — HYDROMORPHONE HCL 110MG/55ML
0.5 PATIENT CONTROLLED ANALGESIA SYRINGE INTRAVENOUS EVERY 5 MIN PRN
Status: DISCONTINUED | OUTPATIENT
Start: 2021-10-27 | End: 2021-10-27

## 2021-10-27 RX ORDER — MAGNESIUM HYDROXIDE 1200 MG/15ML
LIQUID ORAL CONTINUOUS PRN
Status: COMPLETED | OUTPATIENT
Start: 2021-10-27 | End: 2021-10-27

## 2021-10-27 RX ORDER — SODIUM CHLORIDE 9 MG/ML
25 INJECTION, SOLUTION INTRAVENOUS PRN
Status: DISCONTINUED | OUTPATIENT
Start: 2021-10-27 | End: 2021-10-31 | Stop reason: HOSPADM

## 2021-10-27 RX ORDER — OXYCODONE HYDROCHLORIDE 5 MG/1
10 TABLET ORAL PRN
Status: DISCONTINUED | OUTPATIENT
Start: 2021-10-27 | End: 2021-10-27

## 2021-10-27 RX ORDER — HYDROMORPHONE HYDROCHLORIDE 1 MG/ML
0.5 INJECTION, SOLUTION INTRAMUSCULAR; INTRAVENOUS; SUBCUTANEOUS
Status: DISCONTINUED | OUTPATIENT
Start: 2021-10-27 | End: 2021-10-31 | Stop reason: HOSPADM

## 2021-10-27 RX ORDER — SODIUM CHLORIDE 0.9 % (FLUSH) 0.9 %
5-40 SYRINGE (ML) INJECTION PRN
Status: DISCONTINUED | OUTPATIENT
Start: 2021-10-27 | End: 2021-10-31 | Stop reason: HOSPADM

## 2021-10-27 RX ORDER — SODIUM CHLORIDE 0.9 % (FLUSH) 0.9 %
5-40 SYRINGE (ML) INJECTION EVERY 12 HOURS SCHEDULED
Status: DISCONTINUED | OUTPATIENT
Start: 2021-10-27 | End: 2021-10-31 | Stop reason: HOSPADM

## 2021-10-27 RX ORDER — LABETALOL HYDROCHLORIDE 5 MG/ML
5 INJECTION, SOLUTION INTRAVENOUS EVERY 10 MIN PRN
Status: DISCONTINUED | OUTPATIENT
Start: 2021-10-27 | End: 2021-10-27

## 2021-10-27 RX ORDER — DEXMEDETOMIDINE HYDROCHLORIDE 100 UG/ML
INJECTION, SOLUTION INTRAVENOUS PRN
Status: DISCONTINUED | OUTPATIENT
Start: 2021-10-27 | End: 2021-10-27 | Stop reason: SDUPTHER

## 2021-10-27 RX ORDER — FENTANYL CITRATE 50 UG/ML
INJECTION, SOLUTION INTRAMUSCULAR; INTRAVENOUS PRN
Status: DISCONTINUED | OUTPATIENT
Start: 2021-10-27 | End: 2021-10-27 | Stop reason: SDUPTHER

## 2021-10-27 RX ORDER — OXYCODONE HYDROCHLORIDE 5 MG/1
5 TABLET ORAL PRN
Status: DISCONTINUED | OUTPATIENT
Start: 2021-10-27 | End: 2021-10-27

## 2021-10-27 RX ORDER — OXYCODONE HYDROCHLORIDE 5 MG/1
10 TABLET ORAL EVERY 4 HOURS PRN
Status: DISCONTINUED | OUTPATIENT
Start: 2021-10-27 | End: 2021-10-31 | Stop reason: HOSPADM

## 2021-10-27 RX ORDER — FENTANYL CITRATE 50 UG/ML
50 INJECTION, SOLUTION INTRAMUSCULAR; INTRAVENOUS EVERY 5 MIN PRN
Status: DISCONTINUED | OUTPATIENT
Start: 2021-10-27 | End: 2021-10-27

## 2021-10-27 RX ORDER — SUCCINYLCHOLINE/SOD CL,ISO/PF 200MG/10ML
SYRINGE (ML) INTRAVENOUS PRN
Status: DISCONTINUED | OUTPATIENT
Start: 2021-10-27 | End: 2021-10-27 | Stop reason: SDUPTHER

## 2021-10-27 RX ORDER — OXYCODONE HYDROCHLORIDE 5 MG/1
5 TABLET ORAL EVERY 4 HOURS PRN
Status: DISCONTINUED | OUTPATIENT
Start: 2021-10-27 | End: 2021-10-31 | Stop reason: HOSPADM

## 2021-10-27 RX ORDER — DEXAMETHASONE SODIUM PHOSPHATE 4 MG/ML
INJECTION, SOLUTION INTRA-ARTICULAR; INTRALESIONAL; INTRAMUSCULAR; INTRAVENOUS; SOFT TISSUE PRN
Status: DISCONTINUED | OUTPATIENT
Start: 2021-10-27 | End: 2021-10-27 | Stop reason: SDUPTHER

## 2021-10-27 RX ADMIN — DEXAMETHASONE SODIUM PHOSPHATE 8 MG: 4 INJECTION, SOLUTION INTRAMUSCULAR; INTRAVENOUS at 15:52

## 2021-10-27 RX ADMIN — HYDROMORPHONE HYDROCHLORIDE 0.5 MG: 2 INJECTION, SOLUTION INTRAMUSCULAR; INTRAVENOUS; SUBCUTANEOUS at 18:19

## 2021-10-27 RX ADMIN — HYDROMORPHONE HYDROCHLORIDE 1 MG: 1 INJECTION, SOLUTION INTRAMUSCULAR; INTRAVENOUS; SUBCUTANEOUS at 00:06

## 2021-10-27 RX ADMIN — Medication 160 MG: at 15:38

## 2021-10-27 RX ADMIN — MIDAZOLAM 2 MG: 1 INJECTION INTRAMUSCULAR; INTRAVENOUS at 15:32

## 2021-10-27 RX ADMIN — HYDROMORPHONE HYDROCHLORIDE 0.5 MG: 2 INJECTION, SOLUTION INTRAMUSCULAR; INTRAVENOUS; SUBCUTANEOUS at 17:00

## 2021-10-27 RX ADMIN — FENTANYL CITRATE 50 MCG: 50 INJECTION, SOLUTION INTRAMUSCULAR; INTRAVENOUS at 15:38

## 2021-10-27 RX ADMIN — DIPHENHYDRAMINE HYDROCHLORIDE 25 MG: 25 TABLET ORAL at 20:31

## 2021-10-27 RX ADMIN — Medication 30 MG: at 15:53

## 2021-10-27 RX ADMIN — HYDROMORPHONE HYDROCHLORIDE 1 MG: 1 INJECTION, SOLUTION INTRAMUSCULAR; INTRAVENOUS; SUBCUTANEOUS at 04:40

## 2021-10-27 RX ADMIN — HYDROMORPHONE HYDROCHLORIDE 0.5 MG: 2 INJECTION, SOLUTION INTRAMUSCULAR; INTRAVENOUS; SUBCUTANEOUS at 15:53

## 2021-10-27 RX ADMIN — SUGAMMADEX 200 MG: 100 INJECTION, SOLUTION INTRAVENOUS at 17:27

## 2021-10-27 RX ADMIN — ROCURONIUM BROMIDE 50 MG: 10 INJECTION, SOLUTION INTRAVENOUS at 15:50

## 2021-10-27 RX ADMIN — HYDROMORPHONE HYDROCHLORIDE 0.5 MG: 2 INJECTION, SOLUTION INTRAMUSCULAR; INTRAVENOUS; SUBCUTANEOUS at 16:29

## 2021-10-27 RX ADMIN — LORAZEPAM 0.5 MG: 2 INJECTION INTRAMUSCULAR; INTRAVENOUS at 02:54

## 2021-10-27 RX ADMIN — DEXMEDETOMIDINE HYDROCHLORIDE 10 MCG: 100 INJECTION, SOLUTION INTRAVENOUS at 16:57

## 2021-10-27 RX ADMIN — KETOROLAC TROMETHAMINE 15 MG: 15 INJECTION, SOLUTION INTRAMUSCULAR; INTRAVENOUS at 20:30

## 2021-10-27 RX ADMIN — FLUCONAZOLE 200 MG: 200 INJECTION, SOLUTION INTRAVENOUS at 15:30

## 2021-10-27 RX ADMIN — KETOROLAC TROMETHAMINE 15 MG: 15 INJECTION, SOLUTION INTRAMUSCULAR; INTRAVENOUS at 12:47

## 2021-10-27 RX ADMIN — KETOROLAC TROMETHAMINE 15 MG: 15 INJECTION, SOLUTION INTRAMUSCULAR; INTRAVENOUS at 02:54

## 2021-10-27 RX ADMIN — SODIUM CHLORIDE: 9 INJECTION, SOLUTION INTRAVENOUS at 16:28

## 2021-10-27 RX ADMIN — HYDROMORPHONE HYDROCHLORIDE 0.5 MG: 2 INJECTION, SOLUTION INTRAMUSCULAR; INTRAVENOUS; SUBCUTANEOUS at 18:11

## 2021-10-27 RX ADMIN — IOHEXOL: 240 INJECTION, SOLUTION INTRATHECAL; INTRAVASCULAR; INTRAVENOUS; ORAL at 09:15

## 2021-10-27 RX ADMIN — PIPERACILLIN AND TAZOBACTAM 3375 MG: 3; .375 INJECTION, POWDER, LYOPHILIZED, FOR SOLUTION INTRAVENOUS at 00:16

## 2021-10-27 RX ADMIN — LIDOCAINE HYDROCHLORIDE 100 MG: 20 INJECTION, SOLUTION EPIDURAL; INFILTRATION; INTRACAUDAL; PERINEURAL at 15:38

## 2021-10-27 RX ADMIN — MAGNESIUM SULFATE HEPTAHYDRATE 1 G: 500 INJECTION, SOLUTION INTRAMUSCULAR; INTRAVENOUS at 15:52

## 2021-10-27 RX ADMIN — DEXMEDETOMIDINE HYDROCHLORIDE 10 MCG: 100 INJECTION, SOLUTION INTRAVENOUS at 16:10

## 2021-10-27 RX ADMIN — HYDROMORPHONE HYDROCHLORIDE 0.5 MG: 2 INJECTION, SOLUTION INTRAMUSCULAR; INTRAVENOUS; SUBCUTANEOUS at 17:27

## 2021-10-27 RX ADMIN — FENTANYL CITRATE 50 MCG: 50 INJECTION, SOLUTION INTRAMUSCULAR; INTRAVENOUS at 15:56

## 2021-10-27 RX ADMIN — PIPERACILLIN AND TAZOBACTAM 3375 MG: 3; .375 INJECTION, POWDER, LYOPHILIZED, FOR SOLUTION INTRAVENOUS at 08:19

## 2021-10-27 RX ADMIN — LORAZEPAM 0.5 MG: 2 INJECTION INTRAMUSCULAR; INTRAVENOUS at 19:01

## 2021-10-27 RX ADMIN — HYDROMORPHONE HYDROCHLORIDE 1 MG: 1 INJECTION, SOLUTION INTRAMUSCULAR; INTRAVENOUS; SUBCUTANEOUS at 11:11

## 2021-10-27 RX ADMIN — ONDANSETRON 4 MG: 2 INJECTION INTRAMUSCULAR; INTRAVENOUS at 17:20

## 2021-10-27 RX ADMIN — HYDROMORPHONE HYDROCHLORIDE 0.25 MG: 2 INJECTION, SOLUTION INTRAMUSCULAR; INTRAVENOUS; SUBCUTANEOUS at 18:02

## 2021-10-27 RX ADMIN — HYDROMORPHONE HYDROCHLORIDE 1 MG: 1 INJECTION, SOLUTION INTRAMUSCULAR; INTRAVENOUS; SUBCUTANEOUS at 22:01

## 2021-10-27 RX ADMIN — Medication 10 ML: at 08:25

## 2021-10-27 RX ADMIN — PROPOFOL 200 MG: 10 INJECTION, EMULSION INTRAVENOUS at 15:38

## 2021-10-27 RX ADMIN — HYDROMORPHONE HYDROCHLORIDE 1 MG: 1 INJECTION, SOLUTION INTRAMUSCULAR; INTRAVENOUS; SUBCUTANEOUS at 19:01

## 2021-10-27 RX ADMIN — IOPAMIDOL 75 ML: 755 INJECTION, SOLUTION INTRAVENOUS at 11:37

## 2021-10-27 RX ADMIN — ROCURONIUM BROMIDE 10 MG: 10 INJECTION, SOLUTION INTRAVENOUS at 16:58

## 2021-10-27 RX ADMIN — PIPERACILLIN AND TAZOBACTAM 3375 MG: 3; .375 INJECTION, POWDER, LYOPHILIZED, FOR SOLUTION INTRAVENOUS at 15:54

## 2021-10-27 RX ADMIN — ROCURONIUM BROMIDE 10 MG: 10 INJECTION, SOLUTION INTRAVENOUS at 16:26

## 2021-10-27 RX ADMIN — FENTANYL CITRATE 50 MCG: 50 INJECTION, SOLUTION INTRAMUSCULAR; INTRAVENOUS at 15:00

## 2021-10-27 RX ADMIN — HYDROMORPHONE HYDROCHLORIDE 1 MG: 1 INJECTION, SOLUTION INTRAMUSCULAR; INTRAVENOUS; SUBCUTANEOUS at 08:16

## 2021-10-27 RX ADMIN — ENOXAPARIN SODIUM 40 MG: 40 INJECTION SUBCUTANEOUS at 08:24

## 2021-10-27 ASSESSMENT — PULMONARY FUNCTION TESTS
PIF_VALUE: 16
PIF_VALUE: 16
PIF_VALUE: 17
PIF_VALUE: 12
PIF_VALUE: 18
PIF_VALUE: 20
PIF_VALUE: 13
PIF_VALUE: 18
PIF_VALUE: 17
PIF_VALUE: 0
PIF_VALUE: 17
PIF_VALUE: 17
PIF_VALUE: 18
PIF_VALUE: 2
PIF_VALUE: 18
PIF_VALUE: 17
PIF_VALUE: 17
PIF_VALUE: 16
PIF_VALUE: 17
PIF_VALUE: 12
PIF_VALUE: 18
PIF_VALUE: 1
PIF_VALUE: 16
PIF_VALUE: 17
PIF_VALUE: 18
PIF_VALUE: 16
PIF_VALUE: 13
PIF_VALUE: 17
PIF_VALUE: 16
PIF_VALUE: 17
PIF_VALUE: 16
PIF_VALUE: 17
PIF_VALUE: 17
PIF_VALUE: 13
PIF_VALUE: 17
PIF_VALUE: 13
PIF_VALUE: 16
PIF_VALUE: 17
PIF_VALUE: 17
PIF_VALUE: 18
PIF_VALUE: 3
PIF_VALUE: 17
PIF_VALUE: 17
PIF_VALUE: 16
PIF_VALUE: 1
PIF_VALUE: 13
PIF_VALUE: 0
PIF_VALUE: 3
PIF_VALUE: 17
PIF_VALUE: 18
PIF_VALUE: 13
PIF_VALUE: 18
PIF_VALUE: 17
PIF_VALUE: 17
PIF_VALUE: 14
PIF_VALUE: 30
PIF_VALUE: 17
PIF_VALUE: 0
PIF_VALUE: 4
PIF_VALUE: 17
PIF_VALUE: 16
PIF_VALUE: 13
PIF_VALUE: 18
PIF_VALUE: 17
PIF_VALUE: 12
PIF_VALUE: 18
PIF_VALUE: 17
PIF_VALUE: 4
PIF_VALUE: 17
PIF_VALUE: 18
PIF_VALUE: 13
PIF_VALUE: 17
PIF_VALUE: 18
PIF_VALUE: 5
PIF_VALUE: 17
PIF_VALUE: 18
PIF_VALUE: 17
PIF_VALUE: 18
PIF_VALUE: 17
PIF_VALUE: 0
PIF_VALUE: 0
PIF_VALUE: 17
PIF_VALUE: 18
PIF_VALUE: 3
PIF_VALUE: 18
PIF_VALUE: 17
PIF_VALUE: 16
PIF_VALUE: 16
PIF_VALUE: 17
PIF_VALUE: 13
PIF_VALUE: 13
PIF_VALUE: 17
PIF_VALUE: 16
PIF_VALUE: 18
PIF_VALUE: 17
PIF_VALUE: 16
PIF_VALUE: 16
PIF_VALUE: 18
PIF_VALUE: 17
PIF_VALUE: 16
PIF_VALUE: 14
PIF_VALUE: 18
PIF_VALUE: 16
PIF_VALUE: 17
PIF_VALUE: 17
PIF_VALUE: 12
PIF_VALUE: 12
PIF_VALUE: 17
PIF_VALUE: 16

## 2021-10-27 ASSESSMENT — PAIN SCALES - GENERAL
PAINLEVEL_OUTOF10: 9
PAINLEVEL_OUTOF10: 10
PAINLEVEL_OUTOF10: 9
PAINLEVEL_OUTOF10: 6
PAINLEVEL_OUTOF10: 9
PAINLEVEL_OUTOF10: 8
PAINLEVEL_OUTOF10: 10
PAINLEVEL_OUTOF10: 9
PAINLEVEL_OUTOF10: 10
PAINLEVEL_OUTOF10: 9
PAINLEVEL_OUTOF10: 10
PAINLEVEL_OUTOF10: 8

## 2021-10-27 ASSESSMENT — PAIN DESCRIPTION - PAIN TYPE
TYPE: ACUTE PAIN
TYPE: ACUTE PAIN
TYPE: SURGICAL PAIN
TYPE: SURGICAL PAIN
TYPE: ACUTE PAIN
TYPE: SURGICAL PAIN
TYPE: ACUTE PAIN
TYPE: SURGICAL PAIN

## 2021-10-27 ASSESSMENT — ENCOUNTER SYMPTOMS
SHORTNESS OF BREATH: 0
DIARRHEA: 0
RHINORRHEA: 0
TROUBLE SWALLOWING: 0
CONSTIPATION: 0
WHEEZING: 0
EYE REDNESS: 0
SORE THROAT: 0
NAUSEA: 0
COUGH: 0
BACK PAIN: 0
ABDOMINAL PAIN: 1
EYE DISCHARGE: 0

## 2021-10-27 ASSESSMENT — PAIN DESCRIPTION - ORIENTATION
ORIENTATION: LOWER

## 2021-10-27 ASSESSMENT — PAIN DESCRIPTION - LOCATION
LOCATION: ABDOMEN
LOCATION: ABDOMEN;BACK
LOCATION: ABDOMEN
LOCATION: ABDOMEN
LOCATION: ABDOMEN;BACK
LOCATION: ABDOMEN

## 2021-10-27 NOTE — PROGRESS NOTES
[facility]    10/27/2021 12:02 PM        Name: Johnathon Benitez . Admitted: 10/25/2021  Primary Care Provider: Pillo Mcrae MD (Tel: [phone])      Subjective:  . Patient seen this am. Developed increasing pain in lower abdomen last night. Dilaudid no longer helping. Denies nausea. Had some pain that is worsened when he urinates or moves. Reviewed interval ancillary notes    Current Medications  HYDROmorphone HCl PF (DILAUDID) injection 1 mg, Q3H PRN  fluconazole (DIFLUCAN) in 0.9 % sodium chloride IVPB 200 mg, Q24H  piperacillin-tazobactam (ZOSYN) 3,375 mg in dextrose 5 % 50 mL IVPB extended infusion (mini-bag), Q8H  0.9 % sodium chloride infusion, Continuous  ketorolac (TORADOL) injection 15 mg, Q6H PRN  sodium chloride flush 0.9 % injection 5-40 mL, 2 times per day  sodium chloride flush 0.9 % injection 5-40 mL, PRN  0.9 % sodium chloride infusion, PRN  enoxaparin (LOVENOX) injection 40 mg, Daily  ondansetron (ZOFRAN-ODT) disintegrating tablet 4 mg, Q6H PRN   Or  ondansetron (ZOFRAN) injection 4 mg, Q6H PRN  polyethylene glycol (GLYCOLAX) packet 17 g, Daily PRN  acetaminophen (TYLENOL) tablet 650 mg, Q6H PRN   Or  acetaminophen (TYLENOL) suppository 650 mg, Q6H PRN  LORazepam (ATIVAN) injection 0.5 mg, Q6H PRN        Objective:  BP (!) 161/96   Pulse 71   Temp 98.8 °F (37.1 °C) (Oral)   Resp 16   Ht 5' 10\" (1.778 m)   Wt 165 lb 2 oz (74.9 kg)   SpO2 94%   BMI 23.69 kg/m²   No intake or output data in the 24 hours ending 10/27/21 1202   Wt Readings from Last 3 Encounters:   10/25/21 165 lb 2 oz (74.9 kg)   01/04/21 173 lb 3.2 oz (78.6 kg)       General appearance:  Appears comfortable  Eyes: Sclera clear. Pupils equal.  ENT: Moist oral mucosa. Trachea midline, no adenopathy. Cardiovascular: Regular rhythm, normal S1, S2. No murmur.  No edema in lower extremities  Respiratory: Not using accessory muscles. Good inspiratory effort. Clear to auscultation bilaterally, no wheeze or crackles. GI: Abdomen soft, no tenderness, not distended, normal bowel sounds  Musculoskeletal: No cyanosis in digits, neck supple  Neurology: CN 2-12 grossly intact. No speech or motor deficits  Psych: Normal affect. Alert and oriented in time, place and person  Skin: Warm, dry, normal turgor    Labs and Tests:  CBC:   Recent Labs     10/25/21  0755 10/26/21  0709 10/27/21  0622   WBC 15.7* 17.4* 17.7*   HGB 15.9 14.0 13.4*   * 538* 518*     BMP:    Recent Labs     10/25/21  0755 10/26/21  0709 10/27/21  0622   * 136 136   K 3.7 3.4* 3.7   CL 98* 101 102   CO2 25 25 23   BUN 4* 7 12   CREATININE 0.6* 0.6* 0.5*   GLUCOSE 128* 104* 100*     Hepatic:   Recent Labs     10/25/21  0755 10/26/21  0709   AST 21 14*   * 66*   BILITOT 0.6 0.5   ALKPHOS 134* 118     CT abd/pelvis  1. Persistent changes of sigmoid diverticulitis, now with findings of locally   contained perforation and suspected developing 2.5 cm diverticular abscess   2. 1.5 cm hyperdense left renal lesion.  Recommend dedicated renal MRI to   assess for mass versus proteinaceous cyst   3. Hepatic steatosis         Problem List  Active Problems:    Intra-abdominal abscess (HCC)    Acute diverticulitis    Colonic diverticular abscess    Perforated sigmoid colon ([address]    Failure of outpatient treatment    Sepsis ([facility] [address]    Hepatic steatosis  Resolved Problems:    * No resolved hospital problems. *       Assessment & Plan:   1. Diverticulitis with small abscess-on zosyn, diflucan. WBC continues to rise and pain worsening. Repeat CT scan this am. May need surgery pending results. Surgery on board. ID has been consulted. 2. Alcoholism-patient has not had any alcohol the week prior to coming in. Has ativan prn. Counseled.        Diet: Diet NPO Exceptions are: Ice Chips, Sips of Water with Meds  Code:Full Code  DVT PPX: nikki Fajardo PA-C 10/27/2021 12:02 PM

## 2021-10-27 NOTE — PROGRESS NOTES
Pt resting quietly in bed with eyes closed, awakens to voice, drowsy. VSS, O2 sats 93% on room air. Abdominal dressing CDI, stoma pink, moist, no output. KARLA drain and crolwey remain in place. Pt seen by anesthesia, phase 1 criteria met. Will transfer pt to .

## 2021-10-27 NOTE — PROGRESS NOTES
[facility]        Progress Note    Patient Name: Johnathon Benitez  MRN: [id number]  YOB: 1970  Date of Evaluation: 10/27/2021    Chief Complaint: Abdominal pain    Subjective:  No acute events overnight  Pain continues to increased, worse overnight, radiating into upper abdomen now  No nausea or vomiting  Passing some flatus, no BM, feels constipated  Resting in bed at this time, lying flat      Vital Signs:  Patient Vitals for the past 24 hrs:   BP Temp Temp src Pulse Resp SpO2   10/27/21 1208 (!) 153/89 98.9 °F (37.2 °C) Oral 72 16 96 %   10/27/21 0816 (!) 161/96 98.8 °F (37.1 °C) Oral 71 16 94 %   10/27/21 0521 138/80 98.6 °F (37 °C) Oral 76 16 96 %   10/27/21 0010 132/86 99 °F (37.2 °C) Oral 74 16 95 %   10/26/21 2031 (!) 152/86 98.5 °F (36.9 °C) Oral 71 16 95 %   10/26/21 1741 139/81 101.7 °F (38.7 °C) Oral 87 14 94 %      TEMPERATURE HISTORY 24H: Temp (24hrs), Av.3 °F (37.4 °C), Min:98.5 °F (36.9 °C), Max:101.7 °F (38.7 °C)    BLOOD PRESSURE HISTORY: Systolic (93EYR), MTO:099 , Min:132 , HUH:461    Diastolic (34OOE), QOH:63, Min:80, Max:96      Intake/Output:  No intake/output data recorded. No intake/output data recorded.   Drain/tube Output:       Physical Exam:  General: awake, alert, oriented to  person, place, time  Cardiovascular:  regular rate and rhythm and no murmur noted  Lungs: clear to auscultation  Abdomen: soft, non-distended, tenderness most focal in left lower quadrant/suprapubic region, mild in the right lower quadrant, bowel sounds present     Labs:  CBC:    Recent Labs     10/25/21  0755 10/26/21  0709 10/27/21  0622   WBC 15.7* 17.4* 17.7*   HGB 15.9 14.0 13.4*   HCT 46.2 41.4 39.1*   * 538* 518*     BMP:    Recent Labs     10/25/21  0755 10/26/21  0709 10/27/21  0622   * 136 136   K 3.7 3.4* 3.7   CL 98* 101 102   CO2 25 25 23   BUN 4* 7 12   CREATININE 0.6* 0.6* 0.5*   GLUCOSE 128* 104* 100*     Hepatic:    Recent Labs     10/25/21  0755 10/26/21  0709   AST 21 14*   * 66*   BILITOT 0.6 0.5   ALKPHOS 134* 118     Amylase:  No results found for: AMYLASE  Lipase:    Lab Results   Component Value Date    LIPASE 30.0 10/25/2021    LIPASE 43.0 10/18/2021    LIPASE 40.0 10/18/2021      Mag:    Lab Results   Component Value Date    MG 2.30 10/26/2021     Phos:     Lab Results   Component Value Date    PHOS 2.5 10/26/2021      Coags:   Lab Results   Component Value Date    PROTIME 15.6 10/26/2021    INR 1.36 10/26/2021    APTT 32.9 10/26/2021       Cultures:  Anaerobic culture  No results found for: LABANAE  Fungus stain  No results found for requested labs within last 30 days. Gram stain  No results found for requested labs within last 30 days. Organism  No results found for: [facility]  Surgical culture  No results found for: CXSURG  Blood culture 1  No results found for requested labs within last 30 days. Blood culture 2  No results found for requested labs within last 30 days. Fecal occult  No results found for requested labs within last 30 days. GI bacterial pathogens by PCR  No results found for requested labs within last 30 days. C. difficile  No results found for requested labs within last 30 days. Urine culture  No results found for: LABURIN    Pathology:  No relevant pathology     Imaging:  I have personally reviewed the following films:    CT ABDOMEN PELVIS W IV CONTRAST Additional Contrast? Oral    Result Date: 10/27/2021  EXAMINATION: CT OF THE ABDOMEN AND PELVIS WITH CONTRAST 10/27/2021 11:33 am TECHNIQUE: CT of the abdomen and pelvis was performed with the administration of oral and intravenous contrast. Multiplanar reformatted images are provided for review. Dose modulation, iterative reconstruction, and/or weight based adjustment of the mA/kV was utilized to reduce the radiation dose to as low as reasonably achievable.  COMPARISON: CT abdomen and pelvis 10/25/2021 and 10/18/2021 HISTORY: ORDERING SYSTEM PROVIDED HISTORY: followup sigmoid diverticulitis with abscess Acuity: Unknown Type of Exam: Unknown FINDINGS: Lower Chest: Visualized portions of the lungs are unremarkable. Cardiac and posterior mediastinal structures visualized are unremarkable. Organs: Again seen is a relatively hyperattenuating cystic type lesion (61 Hounsfield units (upper pole left kidney, 1.4 x 1.5 cm. Kidneys appear otherwise unremarkable. The liver is fatty with focal area of increased fatty infiltration segment Stanley. No suspicious hepatic lesion or intrahepatic bile duct dilatation evident. Craniocaudal liver length 19 cm. Unremarkable appearance of the spleen, adrenal glands, pancreas and gallbladder. GI/Bowel: Persistent inflammatory changes including areas of wall thickening and pericolonic inflammation centered at the sigmoid colon, with progressing inflammation and fluid collections extending superiorly from the lower left pelvis to the mid and upper pelvis on the left and centrally. Other portions of the colon appear unremarkable, oral contrast reaching the proximal descending colon. The small bowel and stomach appear unremarkable. Normal appearing contrast filled appendix is seen right lower quadrant. Pelvis: Persistent inflammatory changes including areas of wall thickening and pericolonic inflammation centered at the sigmoid colon, with progressing inflammation and fluid collections extending superiorly from the lower left pelvis to the mid and upper pelvis on the left and centrally. Loculated abscess retroperitoneum of the pelvis axial series 2 image 115, sagittal image 93, measures 2.1 x 6.2 x 2.3 cm with small gas bubbles contained within. This is can fluid with an additional abscess located along the superior portion of the sigmoid colon axial series 2, image 130 measuring 2 x 6.1 cm.   This appears can fluid with a 3rd portion of the abscess intimately associated with the anti mesenteric margin of the sigmoid colon (left side of the sigmoid colon (axial series 2, image 141 measuring 3.1 x 3.3 cm, previously 2.3 x 2.5 cm. Peritoneum/Retroperitoneum: Unremarkable appearance of the aorta. No aneurysm. Unremarkable appearance of the IVC. No adenopathy or fluid. No pneumoperitoneum evident. Bones/Soft Tissues: No acute superficial soft tissue or osseous structure abnormality evident. 1. Acute sigmoid diverticulitis with reactive colitis and abscess formation in the pelvis as described above, significantly increased since prior study. 2. Redemonstration of indeterminate lesion upper pole left kidney. Follow-up CT abdomen attention kidneys without and with IV contrast recommended in 3-6 months for additional characterization, versus MRI abdomen attention kidneys without and with gadolinium. 3. Hepatic steatosis. The results were called by Dr. Isidro Blizzard to [facility] on 10/27/2021 at 12:36. Scheduled Meds:   fluconazole  200 mg IntraVENous Q24H    piperacillin-tazobactam  3,375 mg IntraVENous Q8H    sodium chloride flush  5-40 mL IntraVENous 2 times per day    enoxaparin  40 mg SubCUTAneous Daily     Continuous Infusions:   sodium chloride 100 mL/hr at 10/27/21 1251    sodium chloride       PRN Meds:. HYDROmorphone **OR** HYDROmorphone, ketorolac, sodium chloride flush, sodium chloride, ondansetron **OR** ondansetron, polyethylene glycol, acetaminophen **OR** acetaminophen, LORazepam      Assessment:  49 y.o. male admitted with   1. Acute diverticulitis    2. Colonic diverticular abscess        Acute sigmoid diverticulitis with contained perforation and abcess  Mild transaminitis  Alcohol use  Tobacco use       Plan:  1. Pain and leukocytosis worsening, repeat CT worse; plans for exploratory laparotomy, sigmoidectomy, possible ostomy today with Dr. Emmie Person  2. NPO; monitor bowel function  3. IV hydration; monitor and correct electrolytes  4. Antibiotics  5. Activity as tolerated  6.  PRN

## 2021-10-27 NOTE — PROGRESS NOTES
Pt arrived from OR to PACU, arouses to stimuli. VSS, O2 sats 94% on room air. Dressing to abdomen dry and intact, abdomen soft. KARLA drain to abdomen in place, small amount of serosanguinous output. Ileostomy in place, stoma pink, moist, no output at this time. Colindres also in place draining clear yellow urine. Will monitor.

## 2021-10-27 NOTE — ANESTHESIA POSTPROCEDURE EVALUATION
Department of Anesthesiology  Postprocedure Note    Patient: Marcos Friedmanelor  MRN: [id number]  YOB: 1970  Date of evaluation: 10/27/2021  Time:  5:40 PM     Procedure Summary     Date: 10/27/21 Room / Location: [address]    Anesthesia Start: 9040 Anesthesia Stop: 1000    Procedure: EXPLORATORY LAPAROTOMY, SIGMOID RESECTION, ILEOSTOMY. (N/A Abdomen) Diagnosis: (Sigmoid Diverticulitis with Abscess)    Surgeons: Josh Russell MD Responsible Provider: Fatimah Cagle MD    Anesthesia Type: general ASA Status: 2          Anesthesia Type: general    Akash Phase I: Akash Score: 10    Akash Phase II:      Last vitals: Reviewed and per EMR flowsheets.        Anesthesia Post Evaluation    Patient location during evaluation: PACU  Patient participation: complete - patient participated  Level of consciousness: awake and alert  Airway patency: patent  Nausea & Vomiting: no vomiting and no nausea  Complications: no  Cardiovascular status: hemodynamically stable  Respiratory status: acceptable  Hydration status: stable

## 2021-10-27 NOTE — ANESTHESIA PRE PROCEDURE
Department of Anesthesiology  Preprocedure Note       Name:  Marli Olsen   Age:  49 y.o.  :  1970                                          MRN:  [id number]         Date:  10/27/2021      Surgeon: Maci Lunsford):  Brooks Espinoza MD    Procedure: Procedure(s):  EXPLORATORY LAPAROTOMY, SIGMOID RESECTION, COLOSTOMY    Medications prior to admission:   Prior to Admission medications    Medication Sig Start Date End Date Taking?  Authorizing Provider   ondansetron (ZOFRAN ODT) 4 MG disintegrating tablet Take 1 tablet by mouth every 8 hours as needed for Nausea 10/18/21   Matt Gross PA-C   sildenafil (REVATIO) 20 MG tablet Take only 1 to 2 pills every 3 days as needed 21   Maury Pandya MD       Current medications:    Current Facility-Administered Medications   Medication Dose Route Frequency Provider Last Rate Last Admin    HYDROmorphone HCl PF (DILAUDID) injection 0.5 mg  0.5 mg IntraVENous Q2H PRN Brooks Espinoza MD        Or    HYDROmorphone HCl PF (DILAUDID) injection 1 mg  1 mg IntraVENous Q2H PRN Brooks Espinoza MD        meperidine (DEMEROL) injection 12.5 mg  12.5 mg IntraVENous Q5 Min PRN Stew Aragon MD        HYDROmorphone (DILAUDID) injection 0.25 mg  0.25 mg IntraVENous Q5 Min PRN Stew Aragon MD        fentaNYL (SUBLIMAZE) injection 50 mcg  50 mcg IntraVENous Q5 Min PRN Stew Aragon MD        HYDROmorphone (DILAUDID) injection 0.25 mg  0.25 mg IntraVENous Q5 Min PRN Stew Aragon MD        HYDROmorphone (DILAUDID) injection 0.5 mg  0.5 mg IntraVENous Q5 Min PRN Stew Aragon MD        oxyCODONE (ROXICODONE) immediate release tablet 5 mg  5 mg Oral PRN Stew Aragon MD        Or    oxyCODONE (ROXICODONE) immediate release tablet 10 mg  10 mg Oral PRN Stew Aragon MD        promethazine (PHENERGAN) injection 6.25 mg  6.25 mg IntraVENous PRN Stew Aragon MD        diphenhydrAMINE (BENADRYL) injection 12.5 mg  12.5 mg IntraVENous Once PRN Stew Aragon MD       [facility] labetalol (NORMODYNE;TRANDATE) injection 5 mg  5 mg IntraVENous Q10 Min PRN Sona Jean MD        fluconazole (DIFLUCAN) in 0.9 % sodium chloride IVPB 200 mg  200 mg IntraVENous Q24H Joy Pike PA-C 100 mL/hr at 10/26/21 1541 200 mg at 10/26/21 1541    piperacillin-tazobactam (ZOSYN) 3,375 mg in dextrose 5 % 50 mL IVPB extended infusion (mini-bag)  3,375 mg IntraVENous Hitesh Cifuentes MD   Stopped at 10/27/21 1252    0.9 % sodium chloride infusion   IntraVENous Continuous Ashley Salgado  mL/hr at 10/27/21 1251 Restarted at 10/27/21 1251    ketorolac (TORADOL) injection 15 mg  15 mg IntraVENous Q6H PRN Beth Johnson MD   15 mg at 10/27/21 1247    sodium chloride flush 0.9 % injection 5-40 mL  5-40 mL IntraVENous 2 times per day Beth Johnson MD   10 mL at 10/27/21 0825    sodium chloride flush 0.9 % injection 5-40 mL  5-40 mL IntraVENous PRN Ashley Salgado MD        0.9 % sodium chloride infusion  25 mL IntraVENous PRN Beth Johnson MD        enoxaparin (LOVENOX) injection 40 mg  40 mg SubCUTAneous Daily Ashley Salgado MD   40 mg at 10/27/21 0824    ondansetron (ZOFRAN-ODT) disintegrating tablet 4 mg  4 mg Oral Q6H PRN Ashley Salgado MD        Or    ondansetron (ZOFRAN) injection 4 mg  4 mg IntraVENous Q6H PRN Ashley Salgado MD        polyethylene glycol (GLYCOLAX) packet 17 g  17 g Oral Daily PRN Beth Johnson MD        acetaminophen (TYLENOL) tablet 650 mg  650 mg Oral Q6H PRN Ashley Salgado MD        Or   Brice Rich acetaminophen (TYLENOL) suppository 650 mg  650 mg Rectal Q6H PRN Ashley Salgado MD        LORazepam (ATIVAN) injection 0.5 mg  0.5 mg IntraVENous Q6H PRN Nora Zamora MD   0.5 mg at 10/27/21 0254       Allergies:  No Known Allergies    Problem List:    Patient Active Problem List   Diagnosis Code    Other fatigue R53.83    Other male erectile dysfunction N52.8    Cigarette nicotine dependence without complication F96.857    [facility] K65.1    Acute diverticulitis K57.92    Colonic diverticular abscess K57.20    Perforated sigmoid colon (HCC) K63.1    Failure of outpatient treatment Z78.9    Sepsis ([address] A41.9    Hepatic steatosis K76.0       Past Medical History:  History reviewed. No pertinent past medical history. Past Surgical History:  History reviewed. No pertinent surgical history. Social History:    Social History     Tobacco Use    Smoking status: Current Every Day Smoker     Types: Cigarettes    Smokeless tobacco: Never Used   Substance Use Topics    Alcohol use: Yes                                Ready to quit: Not Answered  Counseling given: Not Answered      Vital Signs (Current):   Vitals:    10/27/21 0521 10/27/21 0816 10/27/21 1208 10/27/21 1444   BP: 138/80 (!) 161/96 (!) 153/89 (!) 156/92   Pulse: 76 71 72 86   Resp: 16 16 16 16   Temp: 98.6 °F (37 °C) 98.8 °F (37.1 °C) 98.9 °F (37.2 °C) 99.3 °F (37.4 °C)   TempSrc: Oral Oral Oral Temporal   SpO2: 96% 94% 96% 98%   Weight:       Height:                                                  BP Readings from Last 3 Encounters:   10/27/21 (!) 156/92   10/18/21 125/82   10/18/21 132/83       NPO Status:                                                                                 BMI:   Wt Readings from Last 3 Encounters:   10/25/21 165 lb 2 oz (74.9 kg)   01/04/21 173 lb 3.2 oz (78.6 kg)     Body mass index is 23.69 kg/m².     CBC:   Lab Results   Component Value Date    WBC 17.7 10/27/2021    RBC 4.07 10/27/2021    HGB 13.4 10/27/2021    HCT 39.1 10/27/2021    MCV 96.2 10/27/2021    RDW 13.7 10/27/2021     10/27/2021       CMP:   Lab Results   Component Value Date     10/27/2021    K 3.7 10/27/2021    K 4.0 10/18/2021     10/27/2021    CO2 23 10/27/2021    BUN 12 10/27/2021    CREATININE 0.5 10/27/2021    GFRAA >60 10/27/2021    AGRATIO 0.9 10/25/2021    LABGLOM >60 10/27/2021    GLUCOSE 100 10/27/2021    PROT 6.9 10/26/2021    CALCIUM 8.3 10/27/2021    BILITOT 0.5 10/26/2021 ALKPHOS 118 10/26/2021    AST 14 10/26/2021    ALT 66 10/26/2021       POC Tests: No results for input(s): POCGLU, POCNA, POCK, POCCL, POCBUN, POCHEMO, POCHCT in the last 72 hours. Coags:   Lab Results   Component Value Date    PROTIME 15.6 10/26/2021    INR 1.36 10/26/2021    APTT 32.9 10/26/2021       HCG (If Applicable): No results found for: PREGTESTUR, PREGSERUM, HCG, HCGQUANT     ABGs: No results found for: PHART, PO2ART, SZT6LCB, ZCP8MAB, BEART, U9LGTYQY     Type & Screen (If Applicable):  No results found for: LABABO, LABRH    Drug/Infectious Status (If Applicable):  No results found for: HIV, HEPCAB    COVID-19 Screening (If Applicable):   Lab Results   Component Value Date    COVID19 Not Detected 10/27/2021    COVID19 Not Detected 10/18/2021           Anesthesia Evaluation    Airway: Mallampati: II  TM distance: >3 FB   Neck ROM: full  Mouth opening: > = 3 FB Dental:          Pulmonary:                              Cardiovascular:            Rhythm: regular  Rate: normal                    Neuro/Psych:               GI/Hepatic/Renal:   (+) liver disease:,           Endo/Other:                     Abdominal:             Vascular: Other Findings:             Anesthesia Plan      general     ASA 2       Induction: intravenous. Anesthetic plan and risks discussed with patient. Plan discussed with CRNA.                   Stephie Nunez MD   10/27/2021

## 2021-10-27 NOTE — CONSULTS
Infectious Diseases   Consult Note        Admission Date: 10/25/2021  Hospital Day: Hospital Day: 3   Attending: Vijay Holloway MD  Date of service: 10/27/21     Reason for admission: Intra-abdominal abscess [facility]) [K65.1]  Colonic diverticular abscess [K57.20]  Acute diverticulitis [K57.92]    Chief complaint/ Reason for consult: Intra-abdominal abscess  Microbiology:        I have reviewed allavailable micro lab data and cultures    · Blood culture (2/2) - collected on 10/25/2021: In process      Antibiotics and immunizations:       Current antibiotics: All antibiotics and their doses were reviewed by me    Recent Abx Admin                   piperacillin-tazobactam (ZOSYN) 3,375 mg in dextrose 5 % 50 mL IVPB extended infusion (mini-bag) (mg) 3,375 mg New Bag 10/27/21 0819     3,375 mg New Bag  0016     3,375 mg New Bag 10/26/21 1741    fluconazole (DIFLUCAN) in 0.9 % sodium chloride IVPB 200 mg (mg) 200 mg New Bag 10/26/21 1541                  Immunization History: All immunization history was reviewed by me today. There is no immunization history on file for this patient. Known drug allergies: All allergies were reviewed and updated    No Known Allergies    Social history:     Social History:  All social andepidemiologic history was reviewed and updated by me today as needed. · Tobacco use:   reports that he has been smoking cigarettes. He has never used smokeless tobacco.  · Alcohol use:   reports current alcohol use. · Currently lives in: [place] [id number]  ·  reports no history of drug use. COVID VACCINATION AND LAB RESULT RECORDS:     Internal Administration   First Dose      Second Dose           Last COVID Lab SARS-CoV-2, PCR (no units)   Date Value   10/18/2021 Not Detected            Assessment:     The patient is a 49 y.o. old male who  has no past medical history on file.  with following problems:    · Sepsis with high fever, worsening leukocytosis leukocytosis  · Diverticular abscess measuring 2.5 cm on CT scan of abdomen and pelvis with IV contrast  · Perforated sigmoid diverticulitis  · Failure of outpatient ciprofloxacin and Flagyl  · Left renal lesion on CT scan concerning for mass versus proteinaceous cyst  · Hepatic steatosis  · Negative COVID-19 PCR on 10/18/2021      Discussion:      The patient has been admitted with worsening abdominal pain and is having high fever and worsening leukocytosis despite being on IV Zosyn. His CT scan of abdomen pelvis with IV contrast on 10/25/2021 showed sigmoid diverticulitis with perforation and a diverticular abscess. CT scan of abdomen and pelvis with IV contrast has been performed today      Plan:     Diagnostic Workup:    · No blood cultures have been done during this admission. Ordered 2 sets of blood cultures stat  · Will order serum lactate level stat  · Continue to follow fever curve, WBC count and blood cultures  · Follow up on repeat CT scan of abdomen and pelvis  · Follow up on liverand renal functions closely    Antimicrobials:    · Will o continue IV Zosyn 3.375 g every 8 hour  · Continue IV fluconazole 200 mg every 24 hour  · Antibiotic choice seems appropriate. Patient would likely need a source control with abscess drainage  · Continue to monitor his vitals closely  · General surgery team is following  · If the CT scan shows a worsening or enlarging abscess, would recommend CT-guided or surgical aspiration for source control and sending abscess fluid for bacterial and fungal cultures  · We will follow up on the culture results and clinical progress and will make further recommendations accordingly. · Continue close vitals monitoring. · Maintain good glycemic control. · Fall precautions. Aspiration precautions. · Continue to watch for new fever or diarrhea. · DVT prophylaxis. · Discussed all above with patient and RN.       Drug Monitoring:    · Continue serial monitoring for antibiotic toxicity as follows: CBC, CMP, QTc interval  · Continue to watch for following: new or worsening fever, hypotension, hives, lip swelling and redness or purulence at vascular access sites. I/v access Management:    · Continue to monitor i.v access sites for erythema, induration, discharge or tenderness. · As always, continue efforts to minimizetubes/lines/drains as clinically appropriate to reduce chances of line associated infections. Current isolation precautions: There are no current isolations documented for this patient. Level of complexity of consult: High     Risk of Complications/Morbidity: High     · Illness(es)/ Infection present that pose threat to life/bodily function. · There is potential for severe exacerbation of infection/side effects of treatment. · Therapy requires intensive monitoring for antimicrobial agent toxicity. Thank you for involving me in the care of your patient. I will continue to follow. If you have any additional questions, please do not hesitate to contact me. Subjective:     Presenting complaint in ER:     Chief Complaint   Patient presents with    Abdominal Pain     Pt in with c/o abd pain x1 week. Pt states was here one week ago for same complaint. Reports no BM for one week         HPI: David Mustafa is a 49 y.o. male patient, who was seen at the request of Dr. Chaz Cespedes MD.    History was obtained from chart review and the patient. The patient was admitted on 10/25/2021. I have been consulted to see the patient for above mentioned reason(s). The patient has multiple medical comorbidities, and presented to the ER for concerns for abdominal pain. The patient had initially presented to the ER on 10/18/2021 and was diagnosed with acute diverticulitis and was discharged by the ER team on oral ciprofloxacin and Flagyl. He continued to worsen at home and return to the ER on 10/25/2021 with 9 out of 10 abdominal pain. He was admitted.   His white cell count was 15,700 on presentation to the ER. He was started on empiric IV Zosyn by primary team.  He spiked a fever up to 101.7 yesterday. I have been asked for my opinion for management for this patient. Past Medical History: All past medical history reviewed today. History reviewed. No pertinent past medical history. Past Surgical History: All pastsurgical history was reviewed today. History reviewed. No pertinent surgical history. Family History: All family history was reviewed today. Problem Relation Age of Onset    No Known Problems Mother     No Known Problems Father          Medications: All current and past medications were reviewed. Medications Prior to Admission: [] ciprofloxacin (CIPRO) 500 MG tablet, Take 1 tablet by mouth 2 times daily for 7 days  [] metroNIDAZOLE (FLAGYL) 500 MG tablet, Take 1 tablet by mouth 2 times daily for 7 days  ondansetron (ZOFRAN ODT) 4 MG disintegrating tablet, Take 1 tablet by mouth every 8 hours as needed for Nausea  sildenafil (REVATIO) 20 MG tablet, Take only 1 to 2 pills every 3 days as needed     fluconazole  200 mg IntraVENous Q24H    piperacillin-tazobactam  3,375 mg IntraVENous Q8H    sodium chloride flush  5-40 mL IntraVENous 2 times per day    enoxaparin  40 mg SubCUTAneous Daily          REVIEW OF SYSTEMS:       Review of Systems   Constitutional: Positive for fatigue and fever. Negative for chills and diaphoresis. HENT: Negative for ear discharge, ear pain, rhinorrhea, sore throat and trouble swallowing. Eyes: Negative for discharge and redness. Respiratory: Negative for cough, shortness of breath and wheezing. Cardiovascular: Negative for chest pain and leg swelling. Gastrointestinal: Positive for abdominal pain. Negative for constipation, diarrhea and nausea. Endocrine: Negative for polyuria. Genitourinary: Negative for dysuria, flank pain, frequency, hematuria and urgency.    Musculoskeletal: motion. Cervical back: Normal range of motion and neck supple. No rigidity. No muscular tenderness. Lymphadenopathy:      Cervical: No cervical adenopathy. Skin:     General: Skin is warm and dry. Coloration: Skin is not jaundiced. Findings: No erythema or rash. Neurological:      General: No focal deficit present. Mental Status: He is alert and oriented to person, place, and time. Mental status is at baseline. Motor: No abnormal muscle tone. Psychiatric:         Mood and Affect: Mood normal.         Behavior: Behavior normal.         Thought Content: Thought content normal.           Lines and drains: All vascular access sites are healthy with no local erythema, discharge or tenderness. Intake and output:     No intake/output data recorded. Lab Data:   All available labs were reviewed by me today. CBC:   Recent Labs     10/25/21  0755 10/26/21  0709 10/27/21  0622   WBC 15.7* 17.4* 17.7*   RBC 4.82 4.27 4.07*   HGB 15.9 14.0 13.4*   HCT 46.2 41.4 39.1*   * 538* 518*   MCV 95.9 97.0 96.2   MCH 33.0 32.9 32.8   MCHC 34.5 33.9 34.2   RDW 13.9 13.7 13.7   BANDSPCT 1  --   --         BMP:  Recent Labs     10/25/21  0755 10/26/21  0709 10/27/21  0622   * 136 136   K 3.7 3.4* 3.7   CL 98* 101 102   CO2 25 25 23   BUN 4* 7 12   CREATININE 0.6* 0.6* 0.5*   CALCIUM 9.1 8.4 8.3   GLUCOSE 128* 104* 100*        Hepatic FunctionPanel:   Lab Results   Component Value Date    ALKPHOS 118 10/26/2021    ALT 66 10/26/2021    AST 14 10/26/2021    PROT 6.9 10/26/2021    BILITOT 0.5 10/26/2021    BILIDIR <0.2 10/26/2021    IBILI see below 10/26/2021    LABALBU 3.2 10/26/2021       CPK: No results found for: CKTOTAL  ESR: No results found for: SEDRATE  CRP: No results found for: CRP      Imaging: All pertinent images and reports for the current visit were reviewed by meduring this visit. CT ABDOMEN PELVIS W IV CONTRAST Additional Contrast? None   Final Result   1.  Persistent changes of sigmoid diverticulitis, now with findings of locally   contained perforation and suspected developing 2.5 cm diverticular abscess   2. 1.5 cm hyperdense left renal lesion. Recommend dedicated renal MRI to   assess for mass versus proteinaceous cyst   3. Hepatic steatosis         CT ABDOMEN PELVIS W IV CONTRAST Additional Contrast? Oral    (Results Pending)       Outside records:    Labs, Microbiology, Radiology and pertinent results from Care everywhere, if available, were reviewed as a part ofthe consultation. Problem list:       Patient Active Problem List   Diagnosis Code    Other fatigue R53.83    Other male erectile dysfunction N52.8    Cigarette nicotine dependence without complication D35.594    Intra-abdominal abscess ([facility] K65.1    Acute diverticulitis K57.92    Colonic diverticular abscess K57.20    Perforated sigmoid colon ([address] K63.1    Failure of outpatient treatment Z78.9    Sepsis ([facility] A41.9    Hepatic steatosis K76.0         Please note that this chart was generated using Dragon dictation software. Although every effort was made to ensure the accuracy of this automated transcription, some errors in transcription may have occurred inadvertently. If you may need any clarification, please do not hesitate to contact me through EPIC or at the phone number provided below with my electronic signature. Any pictures or media included in this note were obtained after taking informed verbal consent from the patient and with their approval to include those in the patient's medical record.       Thomas Murrieta MD, MPH  10/27/21, 11:10 AM EDT   [facility] Infectious Disease   [address] [place] [address], [address] [place] [address]  Office: [phone]  Fax: [phone]  Clinic days:  Tuesday & Thursday

## 2021-10-28 ENCOUNTER — APPOINTMENT (OUTPATIENT)
Dept: GENERAL RADIOLOGY | Age: 51
DRG: 853 | End: 2021-10-28
Payer: COMMERCIAL

## 2021-10-28 LAB
ANION GAP SERPL CALCULATED.3IONS-SCNC: 8 MMOL/L (ref 3–16)
BASOPHILS ABSOLUTE: 0 K/UL (ref 0–0.2)
BASOPHILS RELATIVE PERCENT: 0.1 %
BUN BLDV-MCNC: 11 MG/DL (ref 7–20)
CALCIUM SERPL-MCNC: 8.4 MG/DL (ref 8.3–10.6)
CHLORIDE BLD-SCNC: 104 MMOL/L (ref 99–110)
CO2: 21 MMOL/L (ref 21–32)
CREAT SERPL-MCNC: <0.5 MG/DL (ref 0.9–1.3)
EOSINOPHILS ABSOLUTE: 0 K/UL (ref 0–0.6)
EOSINOPHILS RELATIVE PERCENT: 0 %
GFR AFRICAN AMERICAN: >60
GFR NON-AFRICAN AMERICAN: >60
GLUCOSE BLD-MCNC: 139 MG/DL (ref 70–99)
HCT VFR BLD CALC: 39.4 % (ref 40.5–52.5)
HEMOGLOBIN: 12.8 G/DL (ref 13.5–17.5)
LYMPHOCYTES ABSOLUTE: 0.8 K/UL (ref 1–5.1)
LYMPHOCYTES RELATIVE PERCENT: 4 %
MAGNESIUM: 2.2 MG/DL (ref 1.8–2.4)
MCH RBC QN AUTO: 31.5 PG (ref 26–34)
MCHC RBC AUTO-ENTMCNC: 32.6 G/DL (ref 31–36)
MCV RBC AUTO: 96.7 FL (ref 80–100)
MONOCYTES ABSOLUTE: 0.8 K/UL (ref 0–1.3)
MONOCYTES RELATIVE PERCENT: 4.2 %
NEUTROPHILS ABSOLUTE: 17.9 K/UL (ref 1.7–7.7)
NEUTROPHILS RELATIVE PERCENT: 91.7 %
PDW BLD-RTO: 13.4 % (ref 12.4–15.4)
PHOSPHORUS: 3.6 MG/DL (ref 2.5–4.9)
PLATELET # BLD: 518 K/UL (ref 135–450)
PMV BLD AUTO: 7.8 FL (ref 5–10.5)
POTASSIUM SERPL-SCNC: 4 MMOL/L (ref 3.5–5.1)
RBC # BLD: 4.07 M/UL (ref 4.2–5.9)
SODIUM BLD-SCNC: 133 MMOL/L (ref 136–145)
WBC # BLD: 19.6 K/UL (ref 4–11)

## 2021-10-28 PROCEDURE — 6360000002 HC RX W HCPCS: Performed by: SURGERY

## 2021-10-28 PROCEDURE — 0D1B0Z4 BYPASS ILEUM TO CUTANEOUS, OPEN APPROACH: ICD-10-PCS | Performed by: SURGERY

## 2021-10-28 PROCEDURE — 6370000000 HC RX 637 (ALT 250 FOR IP): Performed by: PHYSICIAN ASSISTANT

## 2021-10-28 PROCEDURE — 2580000003 HC RX 258: Performed by: SURGERY

## 2021-10-28 PROCEDURE — 94640 AIRWAY INHALATION TREATMENT: CPT

## 2021-10-28 PROCEDURE — 80048 BASIC METABOLIC PNL TOTAL CA: CPT

## 2021-10-28 PROCEDURE — 1200000000 HC SEMI PRIVATE

## 2021-10-28 PROCEDURE — 84100 ASSAY OF PHOSPHORUS: CPT

## 2021-10-28 PROCEDURE — 99233 SBSQ HOSP IP/OBS HIGH 50: CPT | Performed by: INTERNAL MEDICINE

## 2021-10-28 PROCEDURE — 6370000000 HC RX 637 (ALT 250 FOR IP): Performed by: NURSE PRACTITIONER

## 2021-10-28 PROCEDURE — 99024 POSTOP FOLLOW-UP VISIT: CPT | Performed by: SURGERY

## 2021-10-28 PROCEDURE — 2580000003 HC RX 258: Performed by: PHYSICIAN ASSISTANT

## 2021-10-28 PROCEDURE — 85025 COMPLETE CBC W/AUTO DIFF WBC: CPT

## 2021-10-28 PROCEDURE — 0DTN0ZZ RESECTION OF SIGMOID COLON, OPEN APPROACH: ICD-10-PCS | Performed by: SURGERY

## 2021-10-28 PROCEDURE — 83735 ASSAY OF MAGNESIUM: CPT

## 2021-10-28 PROCEDURE — 71045 X-RAY EXAM CHEST 1 VIEW: CPT

## 2021-10-28 PROCEDURE — 94761 N-INVAS EAR/PLS OXIMETRY MLT: CPT

## 2021-10-28 PROCEDURE — 6370000000 HC RX 637 (ALT 250 FOR IP): Performed by: SURGERY

## 2021-10-28 RX ORDER — IPRATROPIUM BROMIDE AND ALBUTEROL SULFATE 2.5; .5 MG/3ML; MG/3ML
1 SOLUTION RESPIRATORY (INHALATION)
Status: DISCONTINUED | OUTPATIENT
Start: 2021-10-28 | End: 2021-10-31 | Stop reason: HOSPADM

## 2021-10-28 RX ORDER — IPRATROPIUM BROMIDE AND ALBUTEROL SULFATE 2.5; .5 MG/3ML; MG/3ML
1 SOLUTION RESPIRATORY (INHALATION)
Status: DISCONTINUED | OUTPATIENT
Start: 2021-10-28 | End: 2021-10-28

## 2021-10-28 RX ADMIN — KETOROLAC TROMETHAMINE 15 MG: 15 INJECTION, SOLUTION INTRAMUSCULAR; INTRAVENOUS at 08:52

## 2021-10-28 RX ADMIN — ACETAMINOPHEN 650 MG: 325 TABLET ORAL at 08:52

## 2021-10-28 RX ADMIN — HYDROMORPHONE HYDROCHLORIDE 1 MG: 1 INJECTION, SOLUTION INTRAMUSCULAR; INTRAVENOUS; SUBCUTANEOUS at 22:33

## 2021-10-28 RX ADMIN — LORAZEPAM 0.5 MG: 2 INJECTION INTRAMUSCULAR; INTRAVENOUS at 02:34

## 2021-10-28 RX ADMIN — ENOXAPARIN SODIUM 40 MG: 100 INJECTION SUBCUTANEOUS at 08:53

## 2021-10-28 RX ADMIN — KETOROLAC TROMETHAMINE 15 MG: 15 INJECTION, SOLUTION INTRAMUSCULAR; INTRAVENOUS at 02:34

## 2021-10-28 RX ADMIN — HYDROMORPHONE HYDROCHLORIDE 1 MG: 1 INJECTION, SOLUTION INTRAMUSCULAR; INTRAVENOUS; SUBCUTANEOUS at 03:51

## 2021-10-28 RX ADMIN — HYDROMORPHONE HYDROCHLORIDE 1 MG: 1 INJECTION, SOLUTION INTRAMUSCULAR; INTRAVENOUS; SUBCUTANEOUS at 05:54

## 2021-10-28 RX ADMIN — SODIUM CHLORIDE: 9 INJECTION, SOLUTION INTRAVENOUS at 11:59

## 2021-10-28 RX ADMIN — FLUCONAZOLE 200 MG: 200 INJECTION, SOLUTION INTRAVENOUS at 15:56

## 2021-10-28 RX ADMIN — Medication 10 ML: at 08:56

## 2021-10-28 RX ADMIN — ACETAMINOPHEN 650 MG: 325 TABLET ORAL at 13:22

## 2021-10-28 RX ADMIN — HYDROMORPHONE HYDROCHLORIDE 1 MG: 1 INJECTION, SOLUTION INTRAMUSCULAR; INTRAVENOUS; SUBCUTANEOUS at 00:23

## 2021-10-28 RX ADMIN — ACETAMINOPHEN 650 MG: 325 TABLET ORAL at 02:34

## 2021-10-28 RX ADMIN — KETOROLAC TROMETHAMINE 15 MG: 15 INJECTION, SOLUTION INTRAMUSCULAR; INTRAVENOUS at 20:00

## 2021-10-28 RX ADMIN — OXYCODONE 10 MG: 5 TABLET ORAL at 08:52

## 2021-10-28 RX ADMIN — PIPERACILLIN AND TAZOBACTAM 3375 MG: 3; .375 INJECTION, POWDER, LYOPHILIZED, FOR SOLUTION INTRAVENOUS at 09:03

## 2021-10-28 RX ADMIN — SODIUM CHLORIDE: 9 INJECTION, SOLUTION INTRAVENOUS at 22:31

## 2021-10-28 RX ADMIN — LORAZEPAM 0.5 MG: 2 INJECTION INTRAMUSCULAR; INTRAVENOUS at 13:22

## 2021-10-28 RX ADMIN — IPRATROPIUM BROMIDE AND ALBUTEROL SULFATE 1 AMPULE: .5; 3 SOLUTION RESPIRATORY (INHALATION) at 16:35

## 2021-10-28 RX ADMIN — HYDROMORPHONE HYDROCHLORIDE 1 MG: 1 INJECTION, SOLUTION INTRAMUSCULAR; INTRAVENOUS; SUBCUTANEOUS at 17:57

## 2021-10-28 RX ADMIN — OXYCODONE 10 MG: 5 TABLET ORAL at 13:21

## 2021-10-28 RX ADMIN — ACETAMINOPHEN 650 MG: 325 TABLET ORAL at 19:56

## 2021-10-28 RX ADMIN — PIPERACILLIN AND TAZOBACTAM 3375 MG: 3; .375 INJECTION, POWDER, LYOPHILIZED, FOR SOLUTION INTRAVENOUS at 17:30

## 2021-10-28 RX ADMIN — LORAZEPAM 0.5 MG: 2 INJECTION INTRAMUSCULAR; INTRAVENOUS at 20:00

## 2021-10-28 RX ADMIN — IPRATROPIUM BROMIDE AND ALBUTEROL SULFATE 1 AMPULE: .5; 3 SOLUTION RESPIRATORY (INHALATION) at 13:10

## 2021-10-28 RX ADMIN — DIPHENHYDRAMINE HYDROCHLORIDE 25 MG: 25 TABLET ORAL at 02:34

## 2021-10-28 RX ADMIN — PIPERACILLIN AND TAZOBACTAM 3375 MG: 3; .375 INJECTION, POWDER, LYOPHILIZED, FOR SOLUTION INTRAVENOUS at 00:35

## 2021-10-28 RX ADMIN — OXYCODONE 10 MG: 5 TABLET ORAL at 19:07

## 2021-10-28 RX ADMIN — DIPHENHYDRAMINE HYDROCHLORIDE 25 MG: 25 TABLET ORAL at 08:52

## 2021-10-28 ASSESSMENT — ENCOUNTER SYMPTOMS
EYE DISCHARGE: 0
COUGH: 0
DIARRHEA: 0
TROUBLE SWALLOWING: 0
CONSTIPATION: 0
EYE REDNESS: 0
BACK PAIN: 0
ABDOMINAL PAIN: 1
WHEEZING: 0
NAUSEA: 0
RHINORRHEA: 0
SHORTNESS OF BREATH: 0
SORE THROAT: 0

## 2021-10-28 ASSESSMENT — PAIN DESCRIPTION - LOCATION: LOCATION: ABDOMEN

## 2021-10-28 ASSESSMENT — PAIN SCALES - GENERAL
PAINLEVEL_OUTOF10: 6
PAINLEVEL_OUTOF10: 7
PAINLEVEL_OUTOF10: 8
PAINLEVEL_OUTOF10: 7
PAINLEVEL_OUTOF10: 8
PAINLEVEL_OUTOF10: 6
PAINLEVEL_OUTOF10: 7
PAINLEVEL_OUTOF10: 7
PAINLEVEL_OUTOF10: 8
PAINLEVEL_OUTOF10: 4

## 2021-10-28 ASSESSMENT — PAIN DESCRIPTION - PAIN TYPE: TYPE: SURGICAL PAIN

## 2021-10-28 ASSESSMENT — PAIN DESCRIPTION - ORIENTATION: ORIENTATION: LOWER

## 2021-10-28 NOTE — OP NOTE
[facility]                     [address] [facility], [address] [facility]                                OPERATIVE REPORT    PATIENT NAME: Rachelle Tolentino                     :        1970  MED REC NO:   [id number]                          ROOM:       [id number]  ACCOUNT NO:   [de-identified]                           ADMIT DATE: 10/25/2021  PROVIDER:     Ilia Blanco MD    DATE OF PROCEDURE:  10/27/2021    PREOPERATIVE DIAGNOSIS:  Severe acute diverticulitis with abscess. POSTOPERATIVE DIAGNOSIS:  Severe acute diverticulitis with abscess. OPERATION PERFORMED:  Exploratory laparotomy, sigmoidectomy with low  pelvic anastomosis, takedown of splenic flexure, and diverting loop  ileostomy. SURGEON:  Ilia Blanco MD    ANESTHESIA:  General.    INDICATIONS:  This is a 52-year-old male, who presents to [facility] with acute sigmoid diverticulitis with abscess that is not  amenable to percutaneous drainage. The patient was initially trialed on  conservative management with antibiotics and with worsening exam, repeat  CAT scan showed enlarging abscess that is not amenable to percutaneous  techniques. With the concerning exam and uncontrolled pain, the risks,  benefits, and alternative treatments of exploratory laparotomy with  sigmoidectomy and ostomy were discussed. Details of the procedures were  discussed. All questions were answered. The patient understood,  agreed, and wished to proceed. OPERATIVE PROCEDURE:  The patient was brought to the operating suite and  laid in the supine position. General anesthesia was induced and well  tolerated. The patient's abdomen was prepped and draped in the usual  sterile fashion. After a proper time-out was performed verifying  operative site, procedure, and patient, a generous midline incision was  made with a scalpel. This was deepened through the subcutaneous tissue  down to the level of the fascia with a cautery.   The fascia was opened  with electrocautery and then explored. There did not appear to be free  peritoneal contamination and only an abscess in the left lower quadrant  and pelvis was identified around the sigmoid as expected. The lateral  attachments along the white line of T[place] of the descending colon were  taken down with cautery as well as the sigmoid colon, which was held  medially. The left ureter was identified and protected from harm  through the dissection. As the dissection continued down to the rectum,  the abscess cavity was entered digitally and a sample of purulence was  sent for culture. The sigmoid colon was mobilized with a combination of  blunt dissection and careful cautery and it was mobilized easily down to  the rectum, which appeared to be soft and uninvolved with the  inflammatory process. The proximal rectum was encircled with blunt  dissection and cautery and divided with several loads of green [place]  stapler. The mesentery of the sigmoid colon was divided with a LigaSure  Impact device and the proximal sigmoid colon would be used for the  proximal staple line for the anastomosis. This was not opened yet as  the anastomosis bowel would need to be prepared. Additional  mobilization would be necessary for the colon to allow the sigmoid to  reach the pelvis without any tension. This would necessitate the  splenic flexure takedown. The transverse colon was held inferiorly and  thus the greater omentum held superiorly and it was dissected from the  colon with cautery. The dissection was then continued out laterally to  the splenic flexure, which was held medially and inferiorly. The  splenic flexure was taken down with cautery and blunt dissection such  that the entire descending colon was widely mobile and the sigmoid colon  reached the pelvis without any tension.   In preparation for end-to-end  anastomosis, colotomy was made into the specimen side of the bowel and a  29 EEA stapler and spike was brought out through the colotomy up to the  proximal sigmoid colon. The proximal sigmoid colon was then divided  with another green load of [place] stapler and the spike was brought out  through the end of the staple line under direct vision. The sigmoid  colon was then sent off to Pathology for further analysis. Hemostasis  was verified in the pelvis and remainder of the abdomen. The abdomen  was copiously irrigated and suctioned until the irrigant was clear and  the rectum was serially dilated under direct vision and a 29 EEA stapler  was brought out directly through the rectum to the end of the staple  line. The spike was then brought out through the end of the staple line  and made it to the proximal anvil. The stapler was then closed taking  care not to twist the mesentery. There was no tension on the  anastomosis and the anastomotic rings were inspected on the back table  and were of good thickness and uniform on both sides. The patient had  multiple risk factors for anastomotic leak and as such proximal  diversion was chosen. For this reason, the anastomosis was not tested. Again, hemostasis was verified and approximately 30 to 40 cm proximal to  the cecum, the small bowel was widely mobile and brought out through the  right lower quadrant as an ostomy. A cylinder of skin was excised with  cautery and this was dissected down to the rectus sheath, which was  incised in a cruciate fashion. The rectus muscle was split along the  directions of its fibers and posterior sheath entered with cautery as  well. The tract was dilated with two of my fingers and a loop of small  bowel was brought out through the right lower quadrant and matured later  in the case. The fascia was closed with two looped 0 PDS sutures. The  wound was irrigated and the skin was then closed with staples. Attention was turned to the ostomy where a red rubber catheter was used  as loop ileostomy bridge.   The ileostomy was matured in [place] fashion  with multiple interrupted 3-0 Vicryl sutures and the wound was then  cleaned and dressed with gauze and tape and an ostomy appliance. A #19  Haroldo drain had been left in the left abdomen and laid along the left  paracolic gutter down to the pelvis over the anastomosis. The patient  tolerated the procedure well and was transferred to PACU in stable  condition. SPECIMENS:  Abscess for culture and sigmoid colon. DRAINS:  KARLA drain as above. COMPLICATIONS:  None. BLOOD LOSS:  Approximately 300 mL.         Joel Redd MD    D: 10/27/2021 19:36:54       T: 10/28/2021 0:44:15     [email or web address]  Job#: [id number]     Doc#: [id number]    CC:

## 2021-10-28 NOTE — PROGRESS NOTES
[facility] Lapar[facility]        Post-op Note    Pt Name: Kym Otto  MRN: [id number]  YOB: 1970  Date of evaluation: 10/28/2021    Post-op Day #1    Subjective:    No acute events overnight  Pain significantly better  No nausea with clear liquids  Small flatus and enteric contents into ostomy    Vital Signs:  Patient Vitals for the past 24 hrs:   BP Temp Temp src Pulse Resp SpO2   10/28/21 0849 (!) 144/90 98.2 °F (36.8 °C) Oral 75 18 93 %   10/28/21 0615 136/80 98.1 °F (36.7 °C) Oral 63 16 95 %   10/28/21 0015 (!) 145/89 98.5 °F (36.9 °C) Oral 68 14 94 %   10/27/21 2030 (!) 148/93 98.4 °F (36.9 °C) Oral 74 14 92 %   10/27/21 1945 (!) 154/91 98.6 °F (37 °C) Oral 84 14 94 %   10/27/21 1830 (!) 151/93 99 °F (37.2 °C) Oral 85 15 --   10/27/21 1815 (!) 159/87 -- -- 78 14 95 %   10/27/21 1800 (!) 165/87 -- -- 78 16 96 %   10/27/21 1755 (!) 160/79 -- -- 82 18 98 %   10/27/21 1750 138/76 97.2 °F (36.2 °C) Temporal 84 18 97 %   10/27/21 1745 (!) 156/98 -- -- 71 14 93 %   10/27/21 1740 (!) 151/92 -- -- 74 15 94 %   10/27/21 1736 (!) 149/85 97.3 °F (36.3 °C) Temporal 74 14 94 %   10/27/21 1444 (!) 156/92 99.3 °F (37.4 °C) Temporal 86 16 98 %   10/27/21 1208 (!) 153/89 98.9 °F (37.2 °C) Oral 72 16 96 %      TEMPERATURE HISTORY 24H: Temp (24hrs), Av.1 °F (36.7 °C), Min:97.2 °F (36.2 °C), Max:99.3 °F (37.4 °C)    BLOOD PRESSURE HISTORY: Systolic (75JHZ), ZAL:610 , Min:101 , GHY:810    Diastolic (80NQV), BIP:22, Min:61, Max:98      Intake/Output:    I/O last 3 completed shifts:   In: 2280 [P.O.:480; I.V.:1800]  Out: 1265 [Urine:1050; Drains:215]  I/O this shift:  In: -   Out: 180 [Urine:150; Drains:30]  Drain/tube Output:    Closed/Suction Drain Left Abdomen Bulb 19 English-Output (ml): 30 ml      Physical Exam:  General: awake, alert, oriented to  person, place, time  Abdomen: soft, non-distended, appropriate incisional tenderness, dressing clean dry and intact, ostomy pink viable with small enteric output, KARLA serosanguineous    Labs:  CBC:    Recent Labs     10/26/21  0709 10/27/21  0622 10/28/21  0524   WBC 17.4* 17.7* 19.6*   HGB 14.0 13.4* 12.8*   HCT 41.4 39.1* 39.4*   * 518* 518*     BMP:    Recent Labs     10/26/21  0709 10/27/21  0622 10/28/21  0524    136 133*   K 3.4* 3.7 4.0    102 104   CO2 25 23 21   BUN 7 12 11   CREATININE 0.6* 0.5* <0.5*   GLUCOSE 104* 100* 139*     Hepatic:   Recent Labs     10/26/21  0709   AST 14*   ALT 66*   BILITOT 0.5   ALKPHOS 118     Amylase: No results for input(s): AMYLASE in the last 72 hours. Lipase: No results for input(s): LIPASE in the last 72 hours. Mag:      Recent Labs     10/26/21  0709 10/28/21  0524   MG 2.30 2.20      Phos:     Recent Labs     10/26/21  0709 10/28/21  0524   PHOS 2.5 3.6      Coags:   Recent Labs     10/26/21  0709   INR 1.36*   APTT 32.9       Cultures:  Anaerobic culture  No results for input(s): LABANAE in the last 72 hours. Blood culture  No results for input(s): BC in the last 72 hours. Blood culture 2  No results for input(s): BLOODCULT2 in the last 72 hours. Body fluid culture  No results for input(s): BLOODCULT2 in the last 72 hours. Surgical culture  No results for input(s): CXSURG in the last 72 hours. Fecal occult  No results for input(s): OCCULTBLDFEC in the last 72 hours. Gram stain  Recent Labs     10/27/21  1610   LABGRAM 3+ WBC's (Polymorphonuclear)  No organisms seen         Stool culture 1  No results for input(s): CXST in the last 72 hours. Stool culture 2  No results for input(s): STOOLCULT2 in the last 72 hours. Urine culture  No results for input(s): LABURIN in the last 72 hours. Wound abscess  No results for input(s): WNDABS in the last 72 hours. C Diff   No results for input(s): CDIFFTOXAB in the last 72 hours.       Pathology:   pending    Imaging:  I have personally reviewed the following films:    CT ABDOMEN PELVIS W IV CONTRAST Additional Contrast? Oral    Result Date: 10/27/2021  EXAMINATION: CT OF THE ABDOMEN AND PELVIS WITH CONTRAST 10/27/2021 11:33 am TECHNIQUE: CT of the abdomen and pelvis was performed with the administration of oral and intravenous contrast. Multiplanar reformatted images are provided for review. Dose modulation, iterative reconstruction, and/or weight based adjustment of the mA/kV was utilized to reduce the radiation dose to as low as reasonably achievable. COMPARISON: CT abdomen and pelvis 10/25/2021 and 10/18/2021 HISTORY: ORDERING SYSTEM PROVIDED HISTORY: followup sigmoid diverticulitis with abscess Acuity: Unknown Type of Exam: Unknown FINDINGS: Lower Chest: Visualized portions of the lungs are unremarkable. Cardiac and posterior mediastinal structures visualized are unremarkable. Organs: Again seen is a relatively hyperattenuating cystic type lesion (61 Hounsfield units (upper pole left kidney, 1.4 x 1.5 cm. Kidneys appear otherwise unremarkable. The liver is fatty with focal area of increased fatty infiltration segment Stanley. No suspicious hepatic lesion or intrahepatic bile duct dilatation evident. Craniocaudal liver length 19 cm. Unremarkable appearance of the spleen, adrenal glands, pancreas and gallbladder. GI/Bowel: Persistent inflammatory changes including areas of wall thickening and pericolonic inflammation centered at the sigmoid colon, with progressing inflammation and fluid collections extending superiorly from the lower left pelvis to the mid and upper pelvis on the left and centrally. Other portions of the colon appear unremarkable, oral contrast reaching the proximal descending colon. The small bowel and stomach appear unremarkable. Normal appearing contrast filled appendix is seen right lower quadrant.  Pelvis: Persistent inflammatory changes including areas of wall thickening and pericolonic inflammation centered at the sigmoid colon, with progressing inflammation and fluid collections extending superiorly from the lower left pelvis to the mid and upper pelvis on the left and centrally. Loculated abscess retroperitoneum of the pelvis axial series 2 image 115, sagittal image 93, measures 2.1 x 6.2 x 2.3 cm with small gas bubbles contained within. This is can fluid with an additional abscess located along the superior portion of the sigmoid colon axial series 2, image 130 measuring 2 x 6.1 cm. This appears can fluid with a 3rd portion of the abscess intimately associated with the anti mesenteric margin of the sigmoid colon (left side of the sigmoid colon (axial series 2, image 141 measuring 3.1 x 3.3 cm, previously 2.3 x 2.5 cm. Peritoneum/Retroperitoneum: Unremarkable appearance of the aorta. No aneurysm. Unremarkable appearance of the IVC. No adenopathy or fluid. No pneumoperitoneum evident. Bones/Soft Tissues: No acute superficial soft tissue or osseous structure abnormality evident. 1. Acute sigmoid diverticulitis with reactive colitis and abscess formation in the pelvis as described above, significantly increased since prior study. 2. Redemonstration of indeterminate lesion upper pole left kidney. Follow-up CT abdomen attention kidneys without and with IV contrast recommended in 3-6 months for additional characterization, versus MRI abdomen attention kidneys without and with gadolinium. 3. Hepatic steatosis. The results were called by Dr. Rachel Prajapati to [facility] on 10/27/2021 at 12:36. CT ABDOMEN PELVIS W IV CONTRAST Additional Contrast? None    Result Date: 10/25/2021  EXAMINATION: CT OF THE ABDOMEN AND PELVIS WITH CONTRAST 10/25/2021 9:01 am TECHNIQUE: CT of the abdomen and pelvis was performed with the administration of intravenous contrast. Multiplanar reformatted images are provided for review. Dose modulation, iterative reconstruction, and/or weight based adjustment of the mA/kV was utilized to reduce the radiation dose to as low as reasonably achievable.  COMPARISON: Reason for exam:->RLQ pain Additional Contrast?->None Decision Support Exception - unselect if not a suspected or confirmed emergency medical condition->Emergency Medical Condition (MA) Reason for Exam: RLQ PAIN Acuity: Acute Type of Exam: Initial FINDINGS: Lower Chest: Lung bases clear. Organs: Marked diffuse hepatic steatosis. Unremarkable spleen, pancreas, adrenals, and right kidney. A 1.6 cm cyst in the posterolateral cortex of the left kidney. No definite cholelithiasis. GI/Bowel: Normal appendix. Multiple fluid-filled small bowel and colonic loops, nonspecific finding which can be seen with acute enterocolitis. Distal colonic diverticulosis. Circumferential wall thickening of the mid sigmoid colon with surrounding fat stranding, compatible with acute diverticulitis. Anthonette Melisa Pelvis: Incompletely distended urinary bladder. Normal sized prostate. Bilateral small to moderate-sized hydroceles, partially included. Peritoneum/Retroperitoneum: No free air or free fluid. No abdominal or pelvic abscess. No adenopathy. Minimal vascular calcification. No abdominal aortic aneurysm. Bones/Soft Tissues: Small bone island in the superior endplate of L5. No acute osseous abnormality. Acute sigmoid diverticulitis. No free air or peridiverticular abscess. Multiple fluid-filled small bowel and colonic loops, nonspecific finding which can be seen with acute enterocolitis. Normal appendix. Small to moderate-sized bilateral hydroceles partially included.          Scheduled Meds:   ipratropium-albuterol  1 ampule Inhalation Q4H WA    sodium chloride flush  5-40 mL IntraVENous 2 times per day    enoxaparin  40 mg SubCUTAneous Daily    acetaminophen  650 mg Oral Q6H    fluconazole  200 mg IntraVENous Q24H    piperacillin-tazobactam  3,375 mg IntraVENous Q8H    sodium chloride flush  5-40 mL IntraVENous 2 times per day    enoxaparin  40 mg SubCUTAneous Daily     Continuous Infusions:   sodium chloride      sodium chloride 125 mL/hr at 10/28/21 0903    sodium chloride 100 mL/hr at 10/27/21 1628    sodium chloride       PRN Meds:. HYDROmorphone **OR** HYDROmorphone, sodium chloride flush, sodium chloride, promethazine **OR** ondansetron, morphine **OR** morphine, oxyCODONE **OR** oxyCODONE, ketorolac, sodium chloride flush, sodium chloride, ondansetron **OR** ondansetron, polyethylene glycol, acetaminophen **OR** acetaminophen, LORazepam      Assessment:  Patient Active Problem List   Diagnosis    Other fatigue    Other male erectile dysfunction    Cigarette nicotine dependence without complication    Intra-abdominal abscess (HCC)    Acute diverticulitis    Colonic diverticular abscess    [facility]ated sigmoid colon ([facility] [address]    Failure of outpatient treatment    Sepsis ([facility] [address]    Hepatic steatosis     OR Date 10/27/21, Exploratory laparotomy, sigmoidectomy with low pelvic anastomosis, takedown of splenic flexure, and diverting loop ileostomy for severe acute diverticulitis with abscess    Plan:  1. Monitor bowel function, enteric output into ostomy increasing  2. Tolerating clear liquids, advance to fulls  3. Pain control, minimize narcotics  4. IVF, monitor and replace electrolytes as needed  5. Antibiotics per ID, KARLA serosanguineous  6. Activity as tolerated  7. Remove carline Antonio MD, FACS  10/28/2021  10:31 AM

## 2021-10-28 NOTE — PROGRESS NOTES
Comprehensive Nutrition Assessment    Type and Reason for Visit:  Initial, Positive Nutrition Screen (MST 2 for wt loss & poor appetite)    Nutrition Recommendations/Plan:   1. Advance to low fiber diet as tolerated    Nutrition Assessment:  Pt is at risk for nutrition compromise AEB report of poor apptite & wt loss upon admission. Pt states usually eats well & is looking forward to solid foods. Currently tolerating full liquid diet & denies need for ONS. Pt states wt is stable overall. Hx reveals 8 lb wt loss over past 9 months, which is not significant. Encouraged eating low fiber foods once diet advances to solids. Pt expressed no nutrition concerns. Malnutrition Assessment:  Malnutrition Status:  No malnutrition    Context:  Acute Illness       Estimated Daily Nutrient Needs:  Energy (kcal):  2926- 2250 (25-30 kcal/75kg); Weight Used for Energy Requirements:  Current     Protein (g):  90- 113g (1.2-1.5g/75kg); Weight Used for Protein Requirements:  Current        Fluid (ml/day):   ; Method Used for Fluid Requirements:  1 ml/kcal      Nutrition Related Findings:  Ileostomy with output; No edema noted; Na 133      Wounds:  Surgical Incision       Current Nutrition Therapies:    ADULT DIET; Full Liquid    Anthropometric Measures:  · Height: 5' 10\" (177.8 cm)  · Current Body Weight: 165 lb (74.8 kg)   · Admission Body Weight:      · Usual Body Weight: 173 lb (78.5 kg)     · Ideal Body Weight: 166 lbs; % Ideal Body Weight 99.4 %   · BMI: 23.7  · Adjusted Body Weight:  ; No Adjustment   · Adjusted BMI:      · BMI Categories: Normal Weight (BMI 18.5-24. 9)       Nutrition Diagnosis:   · Inadequate oral intake related to altered GI function, inadequate protein-energy intake as evidenced by NPO or clear liquid status due to medical condition      Nutrition Interventions:   Food and/or Nutrient Delivery:  Continue Current Diet (advance as tolerated)  Nutrition Education/Counseling:  Education initiated Coordination of Nutrition Care:  Continue to monitor while inpatient    Goals:  advance diet with po intake at least 50% of meals       Nutrition Monitoring and Evaluation:   Behavioral-Environmental Outcomes:  None Identified   Food/Nutrient Intake Outcomes:     Physical Signs/Symptoms Outcomes:  GI Status, Weight     Discharge Planning:     Too soon to determine     Electronically signed by Daiana Troy RD, LD on 10/28/21 at 2:43 PM EDT    Contact: [phone]

## 2021-10-28 NOTE — PROGRESS NOTES
Physician Progress Note      PATIENTWaverjanina Tolentino  CSN #:                  [id number]  :                       1970  ADMIT DATE:       10/25/2021 7:32 AM  DISCH DATE:  RESPONDING  PROVIDER #:        Taylor Alonso PA-C          QUERY TEXT:    Pt admitted with Diverticulitis with abscess. Pt noted to have Sepsis. If possible, please document in progress notes and discharge summary the   present on admission status of Sepsis:    The medical record reflects the following:  Risk Factors: Diverticulitis with abscess  Clinical Indicators: Patient to ED with worsening abdominal pain, failed OP   treatment for diverticulitis. WBC on admit was 15.7 with rise to 17.4 on   10/26. Fever of 101.7 noted 10/26 5pm. On admit CT scan with persistent   sigmoid diverticulitis now with locally contained perforation, suspected   developing 2.5 cm diverticular abscess. Treatment: Lab work, Imaging, Zosyn, Diflucan, Surgical consult, ID consult.   Options provided:  -- Yes, Sepsis was present at the time of the order to admit to the hospital  -- No, Sepsis was not present on admission and developed during the inpatient   stay  -- Other - I will add my own diagnosis  -- Disagree - Not applicable / Not valid  -- Disagree - Clinically unable to determine / Unknown  -- Refer to Clinical Documentation Reviewer    PROVIDER RESPONSE TEXT:    Yes, Sepsis was present at the time of the order to admit to the hospital.    Query created by: Darryn Jacob on 10/28/2021 7:27 AM      Electronically signed by:  Taylor Alonso PA-C 10/28/2021 10:47 AM

## 2021-10-28 NOTE — CARE COORDINATION
Patient has new loop ileostomy. Provided patient with written materials on ileostomy. Ostomy assessed; pouch in place goo seal, output green and watery. Patient given new pouch to practice opening and closing. Plan to meet with patient and his wife tomorrow to continue ostomy teaching and to change pouch.  ALBERTA TejedaN, RN  [facility]

## 2021-10-28 NOTE — PROGRESS NOTES
Infectious Diseases   Progress Note      Admission Date: 10/25/2021  Hospital Day: Hospital Day: 4   Attending: Miguel A Carter MD  Date of service: 10/28/2021     Chief complaint/ Reason for consult:     · Sepsis with high fever, worsening leukocytosis leukocytosis  · Diverticular abscess measuring 2.5 cm on CT scan of abdomen and pelvis with IV contrast  · Perforated sigmoid diverticulitis  · Failure of outpatient ciprofloxacin and Flagyl    Microbiology:        I have reviewed allavailable micro lab data and cultures    · Blood culture (2/2) - collected on 10/27/2021: In process in process  · Abdominal abscess fluid  culture  - collected on 10/25/2021: in process    10/28/2021 11:10 AM - [facility] Incoming Lab Results From Soft (Epic Adt)    Specimen Information: Abscess        Component Collected Lab   Gram Stain Result 10/27/2021  4:10 PM [facility]   3+ WBC's (Polymorphonuclear)   No organisms seen    Culture Surgical 10/27/2021  4:10 PM 15 [facility]   No growth to date    Testing Performed By    Lab - Abbreviation Name Director Address Valid Date Range   [address] LAB John Carrion M.D. [address] [place] [id number] 09/14/21 1114-Present   19-[facility] 70 LAB Kailyn Mckenna M.D. 416 [facility] [address] 09/14/21 1118-Present   Narrative  Performed by: [facility]  ORDER#: [id number]                          ORDERED BY: ELYSIA SHAFFER   SOURCE: Abscess Abdomen                    COLLECTED:  10/27/21 16:10   ANTIBIOTICS AT [facility]. :                      RECEIVED :  10/27/21 17:19   Performed at:   [facility] [address], [place], [address]   Phone [phone]       Antibiotics and immunizations:       Current antibiotics: All antibiotics and their doses were reviewed by me    Recent Abx Admin piperacillin-tazobactam (ZOSYN) 3,375 mg in dextrose 5 % 50 mL IVPB extended infusion (mini-bag) (mg) 3,375 mg New Bag 10/28/21 0903     3,375 mg New Bag  0035     3,375 mg New Bag 10/27/21 1554    sodium chloride 0.9 % 2,000 mL with gentamicin (GARAMYCIN) 80 mg ()  Given 10/27/21 1715                  Immunization History: All immunization history was reviewed by me today. There is no immunization history on file for this patient. Known drug allergies: All allergies were reviewed and updated    No Known Allergies    Social history:     Social History:  All social andepidemiologic history was reviewed and updated by me today as needed. · Tobacco use:   reports that he has been smoking cigarettes. He has never used smokeless tobacco.  · Alcohol use:   reports current alcohol use. · Currently lives in: [place] [address]  ·  reports no history of drug use. COVID VACCINATION AND LAB RESULT RECORDS:     Internal Administration   First Dose      Second Dose           Last COVID Lab SARS-CoV-2, PCR (no units)   Date Value   10/18/2021 Not Detected     SARS-CoV-2, NAAT (no units)   Date Value   10/27/2021 Not Detected            Assessment:     The patient is a 49 y.o. old male who  has no past medical history on file. with following problems:    · Sepsis with high fever, worsening leukocytosis leukocytosis-White cell count is 19,600 today  · Diverticular abscess measuring 2.5 cm on CT scan of abdomen and pelvis with IV contrast-abscess fluid culture in process  · Perforated sigmoid diverticulitis  · S/p  exploratory laparotomy with sigmoidectomy and low pelvic anastomosis, takedown of splenic flexure and diverting loop ileostomy on 10/28/2021  · Failure of outpatient ciprofloxacin and Flagyl  · Left renal lesion on CT scan concerning for mass versus proteinaceous cyst  · Hepatic steatosis  · Negative COVID-19 PCR on 10/18/2021    Discussion:      The patient is on IV Zosyn and IV fluconazole.   White cell count of 19,600 today. He is otherwise afebrile. He has undergone exploratory laparotomy with sigmoidectomy and low pelvic anastomosis, takedown of splenic flexure and diverting loop ileostomy on 10/28/2021    Serum creatinine 0.5 today. Plan:     Diagnostic Workup:    · Follow-up on surgical cultures and pathology  · Continue to follow  fever curve, WBC count and blood cultures. · Continue to monitor blood counts, liver and renal function. Antimicrobials:    · Elevation in white cell count is likely postprocedural  · Continue IV Zosyn 3.375 g every 8 hour  · Continue IV fluconazole 200 mg every 24 hour  · Abdominal surgical site care  · Ileostomy site care. · We will follow up on the culture results and clinical progress and will make further recommendations accordingly. · Continue close vitals monitoring. · Maintain good glycemic control. · Fall precautions. Aspiration precautions. · Continue to watch for new fever or diarrhea. · DVT prophylaxis. · Discussed all above with patient and RN. Drug Monitoring:    · Continue monitoring for antibiotic toxicity as follows: CBC, CMP, QTc interval.  · Continue to watch for following: new or worsening fever, new hypotension, hives, lip swelling and redness or purulence at vascular access sites. I/v access Management:    · Continue to monitor i.v access sites for erythema, induration, discharge or tenderness. · As always, continue efforts to minimize tubes/lines/drains as clinically appropriate to reduce chances of line associated infections. Patient education and counseling:        · The patient was educated in detail about the side-effects of various antibiotics and things to watch for like new rashes, lip swelling, severe reaction, worsening diarrhea, break through fever etc.  · Discussed patient's condition and what to expect.  All of the patient's questions were addressed in a satisfactory manner and patient verbalized understanding all instructions. Level of complexity of visit: High     Risk of Complications/Morbidity: High     · Illness(es)/ Infection present that pose threat to bodily function. · There is potential for severe exacerbation of infection/side effects of treatment. · Therapy requires intensive monitoring for antimicrobial agent toxicity. TIME SPENT TODAY:     - Spent over  37 minutes on visit (including interval history, physical exam, review of data including labs, cultures, imaging, development and implementation of treatment plan and coordination of complex care). More than 50 percent of this includes face-to-face time spent with the patient for counseling and coordination of care. Thank you for involving me in the care of your patient. I will continue to follow. If you have anyadditional questions, please do not hesitate to contact me. Subjective: Interval history: Interval history was obtained from chart review and patient/ RN. The patient is afebrile. He is tolerating antibiotics okay. He has undergone exploratory laparotomy yesterday     REVIEW OF SYSTEMS:     Review of Systems   Constitutional: Positive for fatigue. Negative for chills, diaphoresis and fever. HENT: Negative for ear discharge, ear pain, rhinorrhea, sore throat and trouble swallowing. Eyes: Negative for discharge and redness. Respiratory: Negative for cough, shortness of breath and wheezing. Cardiovascular: Negative for chest pain and leg swelling. Gastrointestinal: Positive for abdominal pain. Negative for constipation, diarrhea and nausea. Endocrine: Negative for polyuria. Genitourinary: Negative for dysuria, flank pain, frequency, hematuria and urgency. Musculoskeletal: Negative for back pain and myalgias. Skin: Negative for rash. Neurological: Negative for dizziness, seizures and headaches. Hematological: Does not bruise/bleed easily.    Psychiatric/Behavioral: Negative for hallucinations and suicidal ideas. All other systems reviewed and are negative. Past Medical History: All past medical history reviewed today. History reviewed. No pertinent past medical history. Past Surgical History: All past surgical history was reviewed today. Past Surgical History:   Procedure Laterality Date    COLOSTOMY N/A 10/27/2021    EXPLORATORY LAPAROTOMY, SIGMOID RESECTION, ILEOSTOMY. performed by Gabbi Franco MD at [facility]       Family History: All family history was reviewed today. Problem Relation Age of Onset    No Known Problems Mother     No Known Problems Father        Objective:       PHYSICAL EXAM:      Vitals:   Vitals:    10/28/21 0849 10/28/21 1157 10/28/21 1310 10/28/21 1432   BP: (!) 144/90 134/81     Pulse: 75 61     Resp: 18 16 16    Temp: 98.2 °F (36.8 °C) 98.2 °F (36.8 °C)     TempSrc: Oral Oral     SpO2: 93% 94% 94%    Weight:       Height:    5' 10\" (1.778 m)       Physical Exam  Vitals and nursing note reviewed. Constitutional:       Appearance: Normal appearance. He is well-developed. HENT:      Head: Normocephalic and atraumatic. Right Ear: External ear normal.      Left Ear: External ear normal.      Nose: Nose normal. No congestion or rhinorrhea. Mouth/Throat:      Mouth: Mucous membranes are moist.      Pharynx: No oropharyngeal exudate or posterior oropharyngeal erythema. Eyes:      General: No scleral icterus. Right eye: No discharge. Left eye: No discharge. Conjunctiva/sclera: Conjunctivae normal.      Pupils: Pupils are equal, round, and reactive to light. Cardiovascular:      Rate and Rhythm: Normal rate and regular rhythm. Pulses: Normal pulses. Heart sounds: No murmur heard. No friction rub. Pulmonary:      Effort: Pulmonary effort is normal. No respiratory distress. Breath sounds: Normal breath sounds. No stridor. No wheezing, rhonchi or rales.    Abdominal:      General: Bowel sounds are normal. Palpations: Abdomen is soft. Tenderness: There is no abdominal tenderness. There is no right CVA tenderness, left CVA tenderness, guarding or rebound. Comments: Surgical dressing in place. Ostomy site okay   Musculoskeletal:         General: No swelling or tenderness. Normal range of motion. Cervical back: Normal range of motion and neck supple. No rigidity. No muscular tenderness. Lymphadenopathy:      Cervical: No cervical adenopathy. Skin:     General: Skin is warm and dry. Coloration: Skin is not jaundiced. Findings: No erythema or rash. Neurological:      General: No focal deficit present. Mental Status: He is alert and oriented to person, place, and time. Mental status is at baseline. Motor: No abnormal muscle tone. Psychiatric:         Mood and Affect: Mood normal.         Behavior: Behavior normal.         Thought Content: Thought content normal.            Lines and drains: All vascular access sites are healthy with no local erythema, discharge or tenderness. Intake and output:    I/O last 3 completed shifts: In: 2280 [P.O.:480; I.V.:1800]  Out: 8828 [Urine:1400; Drains:255; Stool:150]    Lab Data:   All available labs and old records have been reviewed by me.     CBC:  Recent Labs     10/26/21  0709 10/27/21  0622 10/28/21  0524   WBC 17.4* 17.7* 19.6*   RBC 4.27 4.07* 4.07*   HGB 14.0 13.4* 12.8*   HCT 41.4 39.1* 39.4*   * 518* 518*   MCV 97.0 96.2 96.7   MCH 32.9 32.8 31.5   MCHC 33.9 34.2 32.6   RDW 13.7 13.7 13.4        BMP:  Recent Labs     10/26/21  0709 10/27/21  0622 10/28/21  0524    136 133*   K 3.4* 3.7 4.0    102 104   CO2 25 23 21   BUN 7 12 11   CREATININE 0.6* 0.5* <0.5*   CALCIUM 8.4 8.3 8.4   GLUCOSE 104* 100* 139*        Hepatic Function Panel:   Lab Results   Component Value Date    ALKPHOS 118 10/26/2021    ALT 66 10/26/2021    AST 14 10/26/2021    PROT 6.9 10/26/2021    BILITOT 0.5 10/26/2021    BILIDIR <0.2 10/26/2021    IBILI see below 10/26/2021    LABALBU 3.2 10/26/2021       CPK: No results found for: CKTOTAL  ESR: No results found for: SEDRATE  CRP: No results found for: CRP        Imaging: All pertinent images and reports for the current visit were reviewed by me during this visit. XR CHEST PORTABLE   Final Result   Mild mainly right basilar hypoventilation with density suggestive of   atelectasis      RECOMMENDATION:   Clinical correlation and radiographic follow-up to ensure clearing or CT. CT ABDOMEN PELVIS W IV CONTRAST Additional Contrast? Oral   Final Result   1. Acute sigmoid diverticulitis with reactive colitis and abscess formation   in the pelvis as described above, significantly increased since prior study. 2. Redemonstration of indeterminate lesion upper pole left kidney. Follow-up   CT abdomen attention kidneys without and with IV contrast recommended in 3-6   months for additional characterization, versus MRI abdomen attention kidneys   without and with gadolinium. 3. Hepatic steatosis. The results were called by Dr. Hunter Guillory to [facility] on 10/27/2021   at 12:36. CT ABDOMEN PELVIS W IV CONTRAST Additional Contrast? None   Final Result   1. Persistent changes of sigmoid diverticulitis, now with findings of locally   contained perforation and suspected developing 2.5 cm diverticular abscess   2. 1.5 cm hyperdense left renal lesion. Recommend dedicated renal MRI to   assess for mass versus proteinaceous cyst   3. Hepatic steatosis             Medications: All current and past medications were reviewed.      ipratropium-albuterol  1 ampule Inhalation Q4H WA    sodium chloride flush  5-40 mL IntraVENous 2 times per day    enoxaparin  40 mg SubCUTAneous Daily    acetaminophen  650 mg Oral Q6H    fluconazole  200 mg IntraVENous Q24H    piperacillin-tazobactam  3,375 mg IntraVENous Q8H    sodium chloride flush  5-40 mL IntraVENous 2 times per day       

## 2021-10-28 NOTE — PROGRESS NOTES
Incentive Spirometry education and demonstration completed by Respiratory Therapy Yes      Response to education: Good     Teaching Time: 5 minutes    Minimum Predicted Vital Capacity - 730 mL. Patient's Actual Vital Capacity - 1000 mL. Turning over to Nursing for routine follow-up Yes.         Electronically signed by Migdalia Conklin RCP on 10/28/2021 at 1:18 PM

## 2021-10-29 PROBLEM — Z71.89 COMPLEX CARE COORDINATION: Status: ACTIVE | Noted: 2021-01-04

## 2021-10-29 LAB
ANION GAP SERPL CALCULATED.3IONS-SCNC: 9 MMOL/L (ref 3–16)
BASOPHILS ABSOLUTE: 0.1 K/UL (ref 0–0.2)
BASOPHILS RELATIVE PERCENT: 0.5 %
BUN BLDV-MCNC: 9 MG/DL (ref 7–20)
CALCIUM SERPL-MCNC: 8.2 MG/DL (ref 8.3–10.6)
CHLORIDE BLD-SCNC: 107 MMOL/L (ref 99–110)
CO2: 23 MMOL/L (ref 21–32)
CREAT SERPL-MCNC: <0.5 MG/DL (ref 0.9–1.3)
EOSINOPHILS ABSOLUTE: 0 K/UL (ref 0–0.6)
EOSINOPHILS RELATIVE PERCENT: 0.2 %
GFR AFRICAN AMERICAN: >60
GFR NON-AFRICAN AMERICAN: >60
GLUCOSE BLD-MCNC: 106 MG/DL (ref 70–99)
HCT VFR BLD CALC: 35.4 % (ref 40.5–52.5)
HEMOGLOBIN: 11.6 G/DL (ref 13.5–17.5)
LYMPHOCYTES ABSOLUTE: 1.5 K/UL (ref 1–5.1)
LYMPHOCYTES RELATIVE PERCENT: 12 %
MCH RBC QN AUTO: 31.5 PG (ref 26–34)
MCHC RBC AUTO-ENTMCNC: 32.6 G/DL (ref 31–36)
MCV RBC AUTO: 96.4 FL (ref 80–100)
MONOCYTES ABSOLUTE: 1 K/UL (ref 0–1.3)
MONOCYTES RELATIVE PERCENT: 8.2 %
NEUTROPHILS ABSOLUTE: 10 K/UL (ref 1.7–7.7)
NEUTROPHILS RELATIVE PERCENT: 79.1 %
PDW BLD-RTO: 13.8 % (ref 12.4–15.4)
PLATELET # BLD: 477 K/UL (ref 135–450)
PMV BLD AUTO: 8.2 FL (ref 5–10.5)
POTASSIUM SERPL-SCNC: 3.7 MMOL/L (ref 3.5–5.1)
RBC # BLD: 3.67 M/UL (ref 4.2–5.9)
SODIUM BLD-SCNC: 139 MMOL/L (ref 136–145)
WBC # BLD: 12.7 K/UL (ref 4–11)

## 2021-10-29 PROCEDURE — 2580000003 HC RX 258: Performed by: SURGERY

## 2021-10-29 PROCEDURE — 6370000000 HC RX 637 (ALT 250 FOR IP): Performed by: SURGERY

## 2021-10-29 PROCEDURE — 85025 COMPLETE CBC W/AUTO DIFF WBC: CPT

## 2021-10-29 PROCEDURE — 80048 BASIC METABOLIC PNL TOTAL CA: CPT

## 2021-10-29 PROCEDURE — 6360000002 HC RX W HCPCS: Performed by: SURGERY

## 2021-10-29 PROCEDURE — 99233 SBSQ HOSP IP/OBS HIGH 50: CPT | Performed by: INTERNAL MEDICINE

## 2021-10-29 PROCEDURE — C1751 CATH, INF, PER/CENT/MIDLINE: HCPCS

## 2021-10-29 PROCEDURE — 94640 AIRWAY INHALATION TREATMENT: CPT

## 2021-10-29 PROCEDURE — APPNB30 APP NON BILLABLE TIME 0-30 MINS: Performed by: NURSE PRACTITIONER

## 2021-10-29 PROCEDURE — 6370000000 HC RX 637 (ALT 250 FOR IP): Performed by: PHYSICIAN ASSISTANT

## 2021-10-29 PROCEDURE — 6370000000 HC RX 637 (ALT 250 FOR IP): Performed by: NURSE PRACTITIONER

## 2021-10-29 PROCEDURE — 99024 POSTOP FOLLOW-UP VISIT: CPT | Performed by: SURGERY

## 2021-10-29 PROCEDURE — 94761 N-INVAS EAR/PLS OXIMETRY MLT: CPT

## 2021-10-29 PROCEDURE — 36569 INSJ PICC 5 YR+ W/O IMAGING: CPT

## 2021-10-29 PROCEDURE — 02HV33Z INSERTION OF INFUSION DEVICE INTO SUPERIOR VENA CAVA, PERCUTANEOUS APPROACH: ICD-10-PCS | Performed by: HOSPITALIST

## 2021-10-29 PROCEDURE — 1200000000 HC SEMI PRIVATE

## 2021-10-29 PROCEDURE — 2580000003 HC RX 258: Performed by: NURSE PRACTITIONER

## 2021-10-29 RX ORDER — SODIUM CHLORIDE 0.9 % (FLUSH) 0.9 %
5-40 SYRINGE (ML) INJECTION PRN
Status: DISCONTINUED | OUTPATIENT
Start: 2021-10-29 | End: 2021-10-31 | Stop reason: HOSPADM

## 2021-10-29 RX ORDER — SODIUM CHLORIDE 0.9 % (FLUSH) 0.9 %
5-40 SYRINGE (ML) INJECTION EVERY 12 HOURS SCHEDULED
Status: DISCONTINUED | OUTPATIENT
Start: 2021-10-29 | End: 2021-10-31 | Stop reason: HOSPADM

## 2021-10-29 RX ORDER — SODIUM CHLORIDE 9 MG/ML
INJECTION, SOLUTION INTRAVENOUS CONTINUOUS
Status: DISCONTINUED | OUTPATIENT
Start: 2021-10-29 | End: 2021-10-30

## 2021-10-29 RX ORDER — ZOLPIDEM TARTRATE 5 MG/1
5 TABLET ORAL NIGHTLY PRN
Status: DISCONTINUED | OUTPATIENT
Start: 2021-10-29 | End: 2021-10-31 | Stop reason: HOSPADM

## 2021-10-29 RX ORDER — SODIUM CHLORIDE 9 MG/ML
25 INJECTION, SOLUTION INTRAVENOUS PRN
Status: DISCONTINUED | OUTPATIENT
Start: 2021-10-29 | End: 2021-10-31 | Stop reason: HOSPADM

## 2021-10-29 RX ORDER — LIDOCAINE HYDROCHLORIDE 10 MG/ML
5 INJECTION, SOLUTION EPIDURAL; INFILTRATION; INTRACAUDAL; PERINEURAL ONCE
Status: DISCONTINUED | OUTPATIENT
Start: 2021-10-29 | End: 2021-10-31 | Stop reason: HOSPADM

## 2021-10-29 RX ADMIN — HYDROMORPHONE HYDROCHLORIDE 1 MG: 1 INJECTION, SOLUTION INTRAMUSCULAR; INTRAVENOUS; SUBCUTANEOUS at 22:04

## 2021-10-29 RX ADMIN — HYDROMORPHONE HYDROCHLORIDE 1 MG: 1 INJECTION, SOLUTION INTRAMUSCULAR; INTRAVENOUS; SUBCUTANEOUS at 05:32

## 2021-10-29 RX ADMIN — PIPERACILLIN AND TAZOBACTAM 3375 MG: 3; .375 INJECTION, POWDER, LYOPHILIZED, FOR SOLUTION INTRAVENOUS at 00:32

## 2021-10-29 RX ADMIN — SODIUM CHLORIDE: 9 INJECTION, SOLUTION INTRAVENOUS at 11:34

## 2021-10-29 RX ADMIN — Medication 10 ML: at 08:24

## 2021-10-29 RX ADMIN — IPRATROPIUM BROMIDE AND ALBUTEROL SULFATE 1 AMPULE: .5; 3 SOLUTION RESPIRATORY (INHALATION) at 17:01

## 2021-10-29 RX ADMIN — LORAZEPAM 0.5 MG: 2 INJECTION INTRAMUSCULAR; INTRAVENOUS at 17:47

## 2021-10-29 RX ADMIN — IPRATROPIUM BROMIDE AND ALBUTEROL SULFATE 1 AMPULE: .5; 3 SOLUTION RESPIRATORY (INHALATION) at 20:36

## 2021-10-29 RX ADMIN — Medication 10 ML: at 08:25

## 2021-10-29 RX ADMIN — FLUCONAZOLE 200 MG: 200 INJECTION, SOLUTION INTRAVENOUS at 15:12

## 2021-10-29 RX ADMIN — IPRATROPIUM BROMIDE AND ALBUTEROL SULFATE 1 AMPULE: .5; 3 SOLUTION RESPIRATORY (INHALATION) at 09:01

## 2021-10-29 RX ADMIN — OXYCODONE 10 MG: 5 TABLET ORAL at 11:31

## 2021-10-29 RX ADMIN — Medication 20 ML: at 20:06

## 2021-10-29 RX ADMIN — KETOROLAC TROMETHAMINE 15 MG: 15 INJECTION, SOLUTION INTRAMUSCULAR; INTRAVENOUS at 14:08

## 2021-10-29 RX ADMIN — ZOLPIDEM TARTRATE 5 MG: 5 TABLET ORAL at 20:00

## 2021-10-29 RX ADMIN — OXYCODONE 10 MG: 5 TABLET ORAL at 17:47

## 2021-10-29 RX ADMIN — IPRATROPIUM BROMIDE AND ALBUTEROL SULFATE 1 AMPULE: .5; 3 SOLUTION RESPIRATORY (INHALATION) at 13:26

## 2021-10-29 RX ADMIN — PIPERACILLIN AND TAZOBACTAM 3375 MG: 3; .375 INJECTION, POWDER, LYOPHILIZED, FOR SOLUTION INTRAVENOUS at 08:23

## 2021-10-29 RX ADMIN — ACETAMINOPHEN 650 MG: 325 TABLET ORAL at 20:00

## 2021-10-29 RX ADMIN — HYDROMORPHONE HYDROCHLORIDE 1 MG: 1 INJECTION, SOLUTION INTRAMUSCULAR; INTRAVENOUS; SUBCUTANEOUS at 08:31

## 2021-10-29 RX ADMIN — HYDROMORPHONE HYDROCHLORIDE 1 MG: 1 INJECTION, SOLUTION INTRAMUSCULAR; INTRAVENOUS; SUBCUTANEOUS at 20:01

## 2021-10-29 RX ADMIN — LORAZEPAM 0.5 MG: 2 INJECTION INTRAMUSCULAR; INTRAVENOUS at 08:29

## 2021-10-29 RX ADMIN — HYDROMORPHONE HYDROCHLORIDE 1 MG: 1 INJECTION, SOLUTION INTRAMUSCULAR; INTRAVENOUS; SUBCUTANEOUS at 14:07

## 2021-10-29 RX ADMIN — ACETAMINOPHEN 650 MG: 325 TABLET ORAL at 00:30

## 2021-10-29 RX ADMIN — ACETAMINOPHEN 650 MG: 325 TABLET ORAL at 08:21

## 2021-10-29 RX ADMIN — KETOROLAC TROMETHAMINE 15 MG: 15 INJECTION, SOLUTION INTRAMUSCULAR; INTRAVENOUS at 23:08

## 2021-10-29 RX ADMIN — HYDROMORPHONE HYDROCHLORIDE 1 MG: 1 INJECTION, SOLUTION INTRAMUSCULAR; INTRAVENOUS; SUBCUTANEOUS at 00:31

## 2021-10-29 RX ADMIN — PIPERACILLIN AND TAZOBACTAM 3375 MG: 3; .375 INJECTION, POWDER, LYOPHILIZED, FOR SOLUTION INTRAVENOUS at 17:51

## 2021-10-29 RX ADMIN — ACETAMINOPHEN 650 MG: 325 TABLET ORAL at 14:09

## 2021-10-29 RX ADMIN — ENOXAPARIN SODIUM 40 MG: 100 INJECTION SUBCUTANEOUS at 08:24

## 2021-10-29 ASSESSMENT — ENCOUNTER SYMPTOMS
NAUSEA: 0
SORE THROAT: 0
WHEEZING: 0
DIARRHEA: 0
CONSTIPATION: 0
COUGH: 0
EYE REDNESS: 0
ABDOMINAL PAIN: 0
EYE DISCHARGE: 0
SHORTNESS OF BREATH: 0
BACK PAIN: 0
TROUBLE SWALLOWING: 0
RHINORRHEA: 0

## 2021-10-29 ASSESSMENT — PAIN SCALES - GENERAL
PAINLEVEL_OUTOF10: 7
PAINLEVEL_OUTOF10: 4
PAINLEVEL_OUTOF10: 5
PAINLEVEL_OUTOF10: 3
PAINLEVEL_OUTOF10: 7
PAINLEVEL_OUTOF10: 0
PAINLEVEL_OUTOF10: 4
PAINLEVEL_OUTOF10: 6
PAINLEVEL_OUTOF10: 7

## 2021-10-29 NOTE — DISCHARGE INSTR - COC
Continuity of Care Form    Patient Name: Gianni Polanco   :  1970  MRN:  [id number]    Admit date:  10/25/2021  Discharge date:   10/31/2021    Code Status Order: Full Code   Advance Directives:      Admitting Physician:  Ko Lind MD  PCP: David Vaughn MD    Discharging Nurse: [facility]/Room#: 3OF-7487/4021-68  Discharging Unit Phone Number: [phone]    Emergency Contact:   Extended Emergency Contact Information  Primary Emergency Contact: Corinne Scott [address] Phone: [phone]  Mobile Phone: [phone]  Relation: Spouse   needed? No    Past Surgical History:  Past Surgical History:   Procedure Laterality Date    COLOSTOMY N/A 10/27/2021    EXPLORATORY LAPAROTOMY, SIGMOID RESECTION, ILEOSTOMY. performed by Selvin Benítez MD at [facility]       Immunization History: There is no immunization history on file for this patient.     Active Problems:  Patient Active Problem List   Diagnosis Code    Other fatigue R53.83    Other male erectile dysfunction N52.8    Cigarette nicotine dependence without complication R16.387    Intra-abdominal abscess (HCC) K65.1    Acute diverticulitis K57.92    Colonic diverticular abscess K57.20    Perforated sigmoid colon ([address] K63.1    Failure of outpatient treatment Z78.9    Sepsis ([facility] A41.9    Hepatic steatosis K76.0    History of open sigmoidectomy Z98.890, Z90.49       Isolation/Infection:   Isolation            No Isolation          Patient Infection Status       Infection Onset Added Last Indicated Last Indicated By Review Planned Expiration Resolved Resolved By    None active    Resolved    COVID-19 Rule Out 10/18/21 10/18/21 10/18/21 COVID-19 (Ordered)   10/19/21 Rule-Out Test Resulted            Nurse Assessment:  Last Vital Signs: BP (!) 144/88   Pulse 69   Temp 98 °F (36.7 °C) (Oral)   Resp 16   Ht 5' 10\" (1.778 m)   Wt 165 lb 2 oz (74.9 kg)   SpO2 96%   BMI 23.69 kg/m²     Last documented pain score (0-10 scale): Pain Level: 7  Last Weight:   Wt Readings from Last 1 Encounters:   10/25/21 165 lb 2 oz (74.9 kg)     Mental Status:  alert    IV Access:  - PICC - site  R Brachial , insertion date: 10/30/2021    Nursing Mobility/ADLs:  Walking   Independent  Transfer  Independent  Bathing  Independent  Dressing  Independent  Toileting  Independent  Feeding  Independent  Med Admin  Independent  Med Delivery   whole and water    Wound Care Documentation and Therapy:        Elimination:  Continence:   · Bowel: Yes  · Bladder: Yes  Urinary Catheter: None   Colostomy/Ileostomy/Ileal Conduit: Yes  Ileostomy Ileostomy RLQ-Stomal Appliance: 2 piece  Ileostomy Ileostomy RLQ-Stoma  Assessment: Pink, Moist  Ileostomy Ileostomy RLQ-Mucocutaneous Junction: Intact  Ileostomy Ileostomy RLQ-Peristomal Assessment: Clean, Intact  Ileostomy Ileostomy RLQ-Stool Appearance: Watery  Ileostomy Ileostomy RLQ-Stool Color: Brown  Ileostomy Ileostomy RLQ-Stool Amount: Small  Ileostomy Ileostomy RLQ-Output (mL): 50 ml    Date of Last BM: 10/31/2021    Intake/Output Summary (Last 24 hours) at 10/29/2021 1210  Last data filed at 10/29/2021 1200  Gross per 24 hour   Intake 3019.07 ml   Output 1900 ml   Net 1119.07 ml     I/O last 3 completed shifts: In: 3009.1 [I.V.:2220.4; IV Piggyback:788.7]  Out: 9613 [Urine:1050; Drains:50; Stool:420]    Safety Concerns:     None    Impairments/Disabilities:      None    Nutrition Therapy:  Current Nutrition Therapy:   - Oral Diet:  General    Routes of Feeding: Oral  Liquids: No Restrictions  Daily Fluid Restriction: no  Last Modified Barium Swallow with Video (Video Swallowing Test): not done    Treatments at the Time of Hospital Discharge:   Respiratory Treatments: n/a  Oxygen Therapy:  is not on home oxygen therapy.   Ventilator:    - No ventilator support    Rehab Therapies: n/a  Weight Bearing Status/Restrictions: No weight bearing restirctions  Other Medical Equipment (for information only, NOT a DME order):  n/a  Other Treatments: n/a    Patient's personal belongings (please select all that are sent with patient):  None    RN SIGNATURE:  Electronically signed by Daniela Hernandez RN on 10/31/21 at 12:32 PM EDT    CASE MANAGEMENT/SOCIAL WORK SECTION    Inpatient Status Date: 10/20/21    Readmission Risk Assessment Score:  Readmission Risk              Risk of Unplanned Readmission:  11           Discharging to Facility/ [address]:     [address]:   [address] [facility], [place] [address]   PHONE:        [phone]  FAX:              [address] [place]  PHONE; [phone]  FAX: [phone]    / signature: Electronically signed by Alma Delia Bergman RN on 10/30/21 at 10:58 AM EDT    PHYSICIAN SECTION    Prognosis: Good    Condition at Discharge: Stable    Rehab Potential (if transferring to Rehab): Good    Recommended Labs or Other Treatments After Discharge:     Plan for Ostomy Care: ILEOSTOMY DRESSING CHANGE  Malorie Lowe all supplies for dressing change, make sure new drainage pouch end is closed.   2. Empty pouch.   3. Remove old dressing.    4. Clean stoma and surrounding skin with warm water only- no soap or bath wipes.   5. Dry stoma and surrounding skin & leave dry cloth on stoma to contain any new stool.   6. Measure stoma.   7. Cut new dressing to fit stoma size. Trim small amount of side opposite tab to leave room for midline abdominal incision until incision is healed.   8. Warm new dressing for 30 seconds by rubbing between hands or placing in warm towel.   9. Re-clean & dry stoma & surrounding skin.   10. Apply new dressing with dressing tab facing outer abdomen & trimmed edge  of dressing toward midline abdominal incision. (Save back of dressing for template for next stoma measurement.)   11.  Line white adhesive of drainage pouch up with blue [place] on dressing Landing Pad, apply & check for secure attachment with fingers like checking ziploc  bags or tupperware.     Monitor Abdominal Incision - keep clean and dry    Drain care - left abdominal bulb [address] [employer] - placed 10/27/21                            Out-patient antibiotic therapy (OPAT)/ Antibiotic Infusion orders          Diagnosis: Sigmoid colon perforation: Abdominal abscess       Organism/ culture: Likely polymicrobial     Name and dose of Antimicrobial: IV ertapenem 1 g every 24 hour, IV fluconazole 200 mg every 24 hour     Antimicrobial start date: calculated from 10/30/2021     Antimicrobial completion date planned: 11/15/2021     Lab monitoring: CBC, Chem 12  once a week, to be       collected every Monday or Tuesday morning until the patient is off IV  antibiotics. Fax weekly lab results to Telma Yu MD's office at        [phone]. Please maintain PICC line until the patient is on IV antibiotics. Ok to administer PICC line normal saline flushes as needed. ** Please notify Telma Yu MD's office with any change in patient's        status, transfer out of facility or to hospital by calling [phone]           Outpatient Follow up: Due to the [place] 19 pandemic, plan for a follow-up virtual video visit in 2 weeks. Call my office at [phone] to make an appointment. Physician Signature:  Electronically signed by Telma Yu MD on                                                      10/29/21 at 12:07 PM EDT           Physician Certification: I certify the above information and transfer of Marry Branch  is necessary for the continuing treatment of the diagnosis listed and that he requires [facility] for less 30 days.      Update Admission H&P: No change in H&P    PHYSICIAN:  Harman Dunne PA-C

## 2021-10-29 NOTE — CONSULTS
PICC line education:    -Risks  -Benefits  -Alternatives  -Procedure    Discussed the above with patient, verbalized understanding, answered all questions. Provided with information on PICC care to review. PICC tip verified via 3CG (Ok to use). Reported off to patient's  Nurse [facility].

## 2021-10-29 NOTE — PROGRESS NOTES
Shift assessment complete see flow sheets meds per orders see eMAR. Patient alert and oriented x4, patient assisted with ostomy emptying some and with adjusting abdominal binder. Patient resting in bed with eyes closed at this time. The care plan and education has been reviewed and mutually agreed upon with the patient.      Electronically signed by Chelsea Grossman RN on 10/29/2021 at 3:29 AM

## 2021-10-29 NOTE — PROGRESS NOTES
[facility]    10/29/2021 10:24 AM        Name: Marry Branch . Admitted: 10/25/2021  Primary Care Provider: Brady Patton MD (Tel: [phone])      Subjective:  . Patient admitted with diverticulitis with small abscess. Developed increasing pain. Repeat CT revealed worsening abscess. Taken to OR by Dr Gerardo Ruiz on 10/27. He had an Exploratory laparotomy, sigmoidectomy with low pelvic anastomosis, takedown of splenic flexure, diverting loop ileostomy. Patient seen this am. Feeling much better controlled. Has some coughing which is improving. Wants to go home. Tolerating liquids.      Reviewed interval ancillary notes    Current Medications  0.9 % sodium chloride infusion, Continuous  zolpidem (AMBIEN) tablet 5 mg, Nightly PRN  ipratropium-albuterol (DUONEB) nebulizer solution 1 ampule, Q4H WA  HYDROmorphone HCl PF (DILAUDID) injection 0.5 mg, Q2H PRN   Or  HYDROmorphone HCl PF (DILAUDID) injection 1 mg, Q2H PRN  sodium chloride flush 0.9 % injection 5-40 mL, 2 times per day  sodium chloride flush 0.9 % injection 5-40 mL, PRN  0.9 % sodium chloride infusion, PRN  enoxaparin (LOVENOX) injection 40 mg, Daily  promethazine (PHENERGAN) tablet 12.5 mg, Q6H PRN   Or  ondansetron (ZOFRAN) injection 4 mg, Q6H PRN  morphine (PF) injection 2 mg, Q2H PRN   Or  morphine (PF) injection 4 mg, Q2H PRN  oxyCODONE (ROXICODONE) immediate release tablet 5 mg, Q4H PRN   Or  oxyCODONE (ROXICODONE) immediate release tablet 10 mg, Q4H PRN  acetaminophen (TYLENOL) tablet 650 mg, Q6H  fluconazole (DIFLUCAN) in 0.9 % sodium chloride IVPB 200 mg, Q24H  piperacillin-tazobactam (ZOSYN) 3,375 mg in dextrose 5 % 50 mL IVPB extended infusion (mini-bag), Q8H  ketorolac (TORADOL) injection 15 mg, Q6H PRN  sodium chloride flush 0.9 % injection 5-40 mL, 2 times per day  sodium chloride flush 0.9 % injection 5-40 mL, PRN  0.9 % sodium chloride infusion, PRN  ondansetron (ZOFRAN-ODT) disintegrating tablet 4 mg, Q6H PRN   Or  ondansetron (ZOFRAN) injection 4 mg, Q6H PRN  polyethylene glycol (GLYCOLAX) packet 17 g, Daily PRN  acetaminophen (TYLENOL) tablet 650 mg, Q6H PRN   Or  acetaminophen (TYLENOL) suppository 650 mg, Q6H PRN  LORazepam (ATIVAN) injection 0.5 mg, Q6H PRN        Objective:  BP (!) 151/90   Pulse 57   Temp 98 °F (36.7 °C) (Oral)   Resp 16   Ht 5' 10\" (1.778 m)   Wt 165 lb 2 oz (74.9 kg)   SpO2 95%   BMI 23.69 kg/m²     Intake/Output Summary (Last 24 hours) at 10/29/2021 1024  Last data filed at 10/29/2021 0824  Gross per 24 hour   Intake 3019.07 ml   Output 1340 ml   Net 1679.07 ml      Wt Readings from Last 3 Encounters:   10/25/21 165 lb 2 oz (74.9 kg)   01/04/21 173 lb 3.2 oz (78.6 kg)       General appearance:  Appears comfortable  Eyes: Sclera clear. Pupils equal.  ENT: Moist oral mucosa. Trachea midline, no adenopathy. Cardiovascular: Regular rhythm, normal S1, S2. No murmur. No edema in lower extremities  Respiratory: Not using accessory muscles. Good inspiratory effort. Wheezing B. GI: Abdomen soft, no tenderness, not distended, normal bowel sounds, stoma pink, stool in bag  Musculoskeletal: No cyanosis in digits, neck supple  Neurology: CN 2-12 grossly intact. No speech or motor deficits  Psych: Normal affect. Alert and oriented in time, place and person  Skin: Warm, dry, normal turgor    Labs and Tests:  CBC:   Recent Labs     10/27/21  0622 10/28/21  0524 10/29/21  0557   WBC 17.7* 19.6* 12.7*   HGB 13.4* 12.8* 11.6*   * 518* 477*     BMP:    Recent Labs     10/27/21  0622 10/28/21  0524 10/29/21  0557    133* 139   K 3.7 4.0 3.7    104 107   CO2 23 21 23   BUN 12 11 9   CREATININE 0.5* <0.5* <0.5*   GLUCOSE 100* 139* 106*     Hepatic:   No results for input(s): AST, ALT, ALB, BILITOT, ALKPHOS in the last 72 hours. CT abd/pelvis  1.  Persistent changes of sigmoid diverticulitis, now with findings of

## 2021-10-29 NOTE — PROGRESS NOTES
Infectious Diseases   Progress Note      Admission Date: 10/25/2021  Hospital Day: Hospital Day: 5   Attending: Jonas Archer MD  Date of service: 10/29/2021     Chief complaint/ Reason for consult:     · Sepsis with high fever, worsening leukocytosis leukocytosis  · Diverticular abscess measuring 2.5 cm on CT scan of abdomen and pelvis with IV contrast  · Perforated sigmoid diverticulitis  · Failure of outpatient ciprofloxacin and Flagyl    Microbiology:        I have reviewed allavailable micro lab data and cultures    · Blood culture (2/2) - collected on 10/27/2021: In process in process  · Abdominal abscess fluid  culture  - collected on 10/25/2021: in process    10/28/2021 11:10 AM - [facility] Incoming Lab Results From Soft (Epic Adt)    Specimen Information: Abscess        Component Collected Lab   Gram Stain Result 10/27/2021  4:10 PM [facility]   3+ WBC's (Polymorphonuclear)   No organisms seen    Culture Surgical 10/27/2021  4:10 PM 15 [facility]   No growth to date    Testing Performed By    Lab - Abbreviation Name Director Address Valid Date Range   11- - [facility] Maritza Connell M.D. [address] [place] [id number] 09/14/21 1114-Present   19- - [facility] Markie Briggs M.D. [address] [facility] [id number] 09/14/21 1118-Present   Narrative  Performed by: [facility] [facility]  ORDER#: [id number]                          ORDERED BY: ELYSIA SHAFFER   SOURCE: Abscess Abdomen                    COLLECTED:  10/27/21 16:10   ANTIBIOTICS AT [facility]. :                      RECEIVED :  10/27/21 17:19   Performed at:   [facility] [address], [place], [facility] [address]   Phone [phone]       Antibiotics and immunizations:       Current antibiotics: All antibiotics and their doses were reviewed by me    Recent Abx Admin piperacillin-tazobactam (ZOSYN) 3,375 mg in dextrose 5 % 50 mL IVPB extended infusion (mini-bag) (mg) 3,375 mg New Bag 10/29/21 0823     3,375 mg New Bag  0032     3,375 mg New Bag 10/28/21 1730    fluconazole (DIFLUCAN) in 0.9 % sodium chloride IVPB 200 mg (mg) 200 mg New Bag 10/28/21 1556                  Immunization History: All immunization history was reviewed by me today. There is no immunization history on file for this patient. Known drug allergies: All allergies were reviewed and updated    No Known Allergies    Social history:     Social History:  All social andepidemiologic history was reviewed and updated by me today as needed. · Tobacco use:   reports that he has been smoking cigarettes. He has never used smokeless tobacco.  · Alcohol use:   reports current alcohol use. · Currently lives in: [place] [address]  ·  reports no history of drug use. COVID VACCINATION AND LAB RESULT RECORDS:     Internal Administration   First Dose      Second Dose           Last COVID Lab SARS-CoV-2, PCR (no units)   Date Value   10/18/2021 Not Detected     SARS-CoV-2, NAAT (no units)   Date Value   10/27/2021 Not Detected            Assessment:     The patient is a 49 y.o. old male who  has no past medical history on file. with following problems:    · Sepsis with high fever, worsening leukocytosis leukocytosis-white cell count is 12,700 today  · Diverticular abscess measuring 2.5 cm on CT scan of abdomen and pelvis with IV contrast-abscess fluid culture is in process  · Perforated sigmoid diverticulitis - S/p  exploratory laparotomy with sigmoidectomy and low pelvic anastomosis, takedown of splenic flexure and diverting loop ileostomy on 10/28/2021-covered with IV Zosyn and IV fluconazole  · Failure of outpatient ciprofloxacin and Flagyl  · Left renal lesion on CT scan concerning for mass versus proteinaceous cyst  · Hepatic steatosis  · Negative COVID-19 PCR on 10/18/2021    Discussion:       The patient is on IV Zosyn I will consult. White cell count is slowly improving and is 12,700 today. Abdominal abscess fluid culture from 10/27/2021 is in process. Serum creatinine 0.5 today. Plan:     Diagnostic Workup:      · Continue to follow  fever curve, WBC count and blood cultures. · Continue to monitor blood counts, liver and renal function. Antimicrobials:    · Plan for PICC line placement  · Plan to switch IV Zosyn to IV ertapenem 1 g every 24 hours at discharge, unless surgical culture grows a bacteria that is not covered with ertapenem  · Okay to give 1 dose of IV ertapenem in the hospital before discharge planning  · Plan for at least 2 weeks of IV ertapenem and IV fluconazole at discharge  · We will follow up on the culture results and clinical progress and will make further recommendations accordingly. · Continue close vitals monitoring. · Maintain good glycemic control. · Fall precautions. Aspiration precautions. · Continue to watch for new fever or diarrhea. · DVT prophylaxis. · Discussed all above with patient and RN. · Discussed with his wife at bedside    [facility] has been updated with infusion orders as follows: Out-patient antibiotic therapy (OPAT)/ Antibiotic Infusion orders          Diagnosis: Sigmoid colon perforation: Abdominal abscess       Organism/ culture: Likely polymicrobial     Name and dose of Antimicrobial: IV ertapenem 1 g every 24 hour, IV fluconazole 200 mg every 24 hour     Antimicrobial start date: calculated from 10/30/2021     Antimicrobial completion date planned: 11/15/2021     Lab monitoring: CBC, Chem 12  once a week, to be       collected every Monday or Tuesday morning until the patient is off IV  antibiotics. Fax weekly lab results to Beula Bloch, MD's office at        [phone]. Please maintain PICC line until the patient is on IV antibiotics. Ok to administer PICC line normal saline flushes as needed.         ** Please notify Redd Villatoro MD's office with any change in patient's        status, transfer out of facility or to hospital by calling [phone]           Outpatient Follow up: Due to the Neita Jet 19 pandemic, plan for a follow-up virtual video visit in 2 weeks. Call my office at [phone] to make an appointment. Physician Signature:  Electronically signed by Redd Villatoro MD on                                                      10/29/21 at 12:07 PM EDT             Drug Monitoring:    · Continue monitoring for antibiotic toxicity as follows: CBC, CMP   · Continue to watch for following: new or worsening fever, new hypotension, hives, lip swelling and redness or purulence at vascular access sites. I/v access Management:    · Continue to monitor i.v access sites for erythema, induration, discharge or tenderness. · As always, continue efforts to minimize tubes/lines/drains as clinically appropriate to reduce chances of line associated infections. Patient education and counseling:        · The patient was educated in detail about the side-effects of various antibiotics and things to watch for like new rashes, lip swelling, severe reaction, worsening diarrhea, break through fever etc.  · Discussed patient's condition and what to expect. All of the patient's questions were addressed in a satisfactory manner and patient verbalized understanding all instructions. Level of complexity of visit: High     Risk of Complications/Morbidity: High     · Illness(es)/ Infection present that pose threat to bodily function. · There is potential for severe exacerbation of infection/side effects of treatment. · Therapy requires intensive monitoring for antimicrobial agent toxicity.     TIME SPENT TODAY:     - Spent over  36 minutes on visit (including interval history, physical exam, review of data including labs, cultures, imaging, development and implementation of treatment plan and coordination of complex care). More than 50 percent of this includes face-to-face time spent with the patient for counseling and coordination of care. Thank you for involving me in the care of your patient. I will continue to follow. If you have anyadditional questions, please do not hesitate to contact me. Subjective: Interval history: Interval history was obtained from chart review and patient/ RN. The patient is afebrile. White cell count is improving and is 12,700 today. He is afebrile     REVIEW OF SYSTEMS:     Review of Systems   Constitutional: Positive for fatigue. Negative for chills, diaphoresis and fever. HENT: Negative for ear discharge, ear pain, rhinorrhea, sore throat and trouble swallowing. Eyes: Negative for discharge and redness. Respiratory: Negative for cough, shortness of breath and wheezing. Cardiovascular: Negative for chest pain and leg swelling. Gastrointestinal: Negative for abdominal pain, constipation, diarrhea and nausea. Endocrine: Negative for polyuria. Genitourinary: Negative for dysuria, flank pain, frequency, hematuria and urgency. Musculoskeletal: Negative for back pain and myalgias. Skin: Negative for rash. Neurological: Negative for dizziness, seizures and headaches. Hematological: Does not bruise/bleed easily. Psychiatric/Behavioral: Negative for hallucinations and suicidal ideas. All other systems reviewed and are negative. Past Medical History: All past medical history reviewed today. History reviewed. No pertinent past medical history. Past Surgical History: All past surgical history was reviewed today. Past Surgical History:   Procedure Laterality Date    COLOSTOMY N/A 10/27/2021    EXPLORATORY LAPAROTOMY, SIGMOID RESECTION, ILEOSTOMY. performed by La Engle MD at [facility]       Family History: All family history was reviewed today.         Problem Relation Age of Onset    No Known Problems Mother     No Known Problems Father Objective:       PHYSICAL EXAM:      Vitals:   Vitals:    10/29/21 0436 10/29/21 0740 10/29/21 0903 10/29/21 1141   BP: 135/79 (!) 151/90  (!) 144/88   Pulse: 79 57  69   Resp: 16 16  16   Temp: 98.1 °F (36.7 °C) 98 °F (36.7 °C)  98 °F (36.7 °C)   TempSrc: Oral Oral  Oral   SpO2: 96% 95% 95% 96%   Weight:       Height:           Physical Exam  Vitals and nursing note reviewed. Constitutional:       Appearance: Normal appearance. He is well-developed. HENT:      Head: Normocephalic and atraumatic. Right Ear: External ear normal.      Left Ear: External ear normal.      Nose: Nose normal. No congestion or rhinorrhea. Mouth/Throat:      Mouth: Mucous membranes are moist.      Pharynx: No oropharyngeal exudate or posterior oropharyngeal erythema. Eyes:      General: No scleral icterus. Right eye: No discharge. Left eye: No discharge. Conjunctiva/sclera: Conjunctivae normal.      Pupils: Pupils are equal, round, and reactive to light. Cardiovascular:      Rate and Rhythm: Normal rate and regular rhythm. Pulses: Normal pulses. Heart sounds: No murmur heard. No friction rub. Pulmonary:      Effort: Pulmonary effort is normal. No respiratory distress. Breath sounds: Normal breath sounds. No stridor. No wheezing, rhonchi or rales. Abdominal:      General: Bowel sounds are normal.      Palpations: Abdomen is soft. Tenderness: There is no abdominal tenderness. There is no right CVA tenderness, left CVA tenderness, guarding or rebound. Comments: Surgical dressings in place   Musculoskeletal:         General: No swelling or tenderness. Normal range of motion. Cervical back: Normal range of motion and neck supple. No rigidity. No muscular tenderness. Lymphadenopathy:      Cervical: No cervical adenopathy. Skin:     General: Skin is warm and dry. Coloration: Skin is not jaundiced. Findings: No erythema or rash.    Neurological: General: No focal deficit present. Mental Status: He is alert and oriented to person, place, and time. Mental status is at baseline. Motor: No abnormal muscle tone. Psychiatric:         Mood and Affect: Mood normal.         Behavior: Behavior normal.         Thought Content: Thought content normal.            *    Lines and drains: All vascular access sites are healthy with no local erythema, discharge or tenderness. Intake and output:    I/O last 3 completed shifts: In: 3009.1 [I.V.:2220.4; IV Piggyback:788.7]  Out: 1520 [Urine:1050; Drains:50; Stool:420]    Lab Data:   All available labs and old records have been reviewed by me. CBC:  Recent Labs     10/27/21  0622 10/28/21  0524 10/29/21  0557   WBC 17.7* 19.6* 12.7*   RBC 4.07* 4.07* 3.67*   HGB 13.4* 12.8* 11.6*   HCT 39.1* 39.4* 35.4*   * 518* 477*   MCV 96.2 96.7 96.4   MCH 32.8 31.5 31.5   MCHC 34.2 32.6 32.6   RDW 13.7 13.4 13.8        BMP:  Recent Labs     10/27/21  0622 10/28/21  0524 10/29/21  0557    133* 139   K 3.7 4.0 3.7    104 107   CO2 23 21 23   BUN 12 11 9   CREATININE 0.5* <0.5* <0.5*   CALCIUM 8.3 8.4 8.2*   GLUCOSE 100* 139* 106*        Hepatic Function Panel:   Lab Results   Component Value Date    ALKPHOS 118 10/26/2021    ALT 66 10/26/2021    AST 14 10/26/2021    PROT 6.9 10/26/2021    BILITOT 0.5 10/26/2021    BILIDIR <0.2 10/26/2021    IBILI see below 10/26/2021    LABALBU 3.2 10/26/2021       CPK: No results found for: CKTOTAL  ESR: No results found for: SEDRATE  CRP: No results found for: CRP        Imaging: All pertinent images and reports for the current visit were reviewed by me during this visit. XR CHEST PORTABLE   Final Result   Mild mainly right basilar hypoventilation with density suggestive of   atelectasis      RECOMMENDATION:   Clinical correlation and radiographic follow-up to ensure clearing or CT.          CT ABDOMEN PELVIS W IV CONTRAST Additional Contrast? Oral   Final Result 1. Acute sigmoid diverticulitis with reactive colitis and abscess formation   in the pelvis as described above, significantly increased since prior study. 2. Redemonstration of indeterminate lesion upper pole left kidney. Follow-up   CT abdomen attention kidneys without and with IV contrast recommended in 3-6   months for additional characterization, versus MRI abdomen attention kidneys   without and with gadolinium. 3. Hepatic steatosis. The results were called by Dr. [facility] on 10[facility]27/2021   at 12:36. CT ABDOMEN PELVIS W IV CONTRAST Additional Contrast? None   Final Result   1. Persistent changes of sigmoid diverticulitis, now with findings of locally   contained perforation and suspected developing 2.5 cm diverticular abscess   2. 1.5 cm hyperdense left renal lesion. Recommend dedicated renal MRI to   assess for mass versus proteinaceous cyst   3. Hepatic steatosis             Medications: All current and past medications were reviewed.      ipratropium-albuterol  1 ampule Inhalation Q4H WA    sodium chloride flush  5-40 mL IntraVENous 2 times per day    enoxaparin  40 mg SubCUTAneous Daily    acetaminophen  650 mg Oral Q6H    fluconazole  200 mg IntraVENous Q24H    piperacillin-tazobactam  3,375 mg IntraVENous Q8H    sodium chloride flush  5-40 mL IntraVENous 2 times per day        sodium chloride 50 mL/hr at 10/29/21 1134    sodium chloride      sodium chloride         zolpidem, HYDROmorphone **OR** HYDROmorphone, sodium chloride flush, sodium chloride, promethazine **OR** ondansetron, morphine **OR** morphine, oxyCODONE **OR** oxyCODONE, ketorolac, sodium chloride flush, sodium chloride, ondansetron **OR** ondansetron, polyethylene glycol, acetaminophen **OR** acetaminophen, LORazepam      Problem list:       Patient Active Problem List   Diagnosis Code    Other fatigue R53.83    Other male erectile dysfunction N52.8    Cigarette nicotine dependence without complication X02.957    Intra-abdominal abscess (HCC) K65.1    Acute diverticulitis K57.92    Colonic diverticular abscess K57.20    Perforated sigmoid colon (HCC) K63.1    Failure of outpatient treatment Z78.9    Sepsis ([address] A41.9    Hepatic steatosis K76.0    History of open sigmoidectomy Z98.890, Z90.49       Please note that this chart was generated using Dragon dictation software. Although every effort was made to ensure the accuracy of this automated transcription, some errors in transcription may have occurred inadvertently. If you may need any clarification, please do not hesitate to contact me through EPIC or at the phone number provided below with my electronic signature. Any pictures or media included in this note were obtained after taking informed verbal consent from the patient and with their approval to include those in the patient's medical record.     Tamra Quiroga MD, MPH  10/29/2021 , 12:05 PM   [facility]   [address] [place] [address], [address] [place] [address]  Office: [phone]  Fax: [phone]  Clinic days:  Tuesday & Thursday

## 2021-10-29 NOTE — PLAN OF CARE
Problem: SKIN INTEGRITY  Goal: Skin integrity is maintained or improved  Intervention: COLLABORATE WITH WOCN  Note:   Plan for Ostomy Care: EMPTY POUCH WHEN 1/3 TO 1/2 FULL WITH GAS OR OUTPUT   CHANGE POUCH EVERY 3 TO 4 DAYS OR IF LEAKING. ILEOSTOMY DRESSING CHANGE    1. Gather all supplies for dressing change, make sure new drainage pouch end is closed. 2. Empty pouch. 3. Remove old dressing. 4. Clean stoma and surrounding skin with warm water only- no soap or bath wipes. 5. Dry stoma and surrounding skin & leave dry cloth on stoma to contain any new stool. 6. Measure stoma. 7. Cut new dressing to fit stoma size. Trim small amount of side opposite tab to leave room for midline abdominal incision until incision is healed. 8. Warm new dressing for 30 seconds by rubbing between hands or placing in warm towel. 9. Re-clean & dry stoma & surrounding skin. 10. Apply new dressing with dressing tab facing outer abdomen & trimmed edge  of dressing toward midline abdominal incision. (Save back of dressing for template for next stoma measurement.)    11. Line white adhesive of drainage pouch up with blue [place] on dressing Landing Pad, apply & check for secure attachment with fingers like checking ziploc  bags or tupperware.           Ostomy Plan of Care     Brand/supplies at bedside  Coloplast barrier # [id number], pouch #[id number]

## 2021-10-29 NOTE — CARE COORDINATION
Ostomy Referral Follow-up Progress Note      NAME:  Willian Gallo RECORD NUMBER:  [id number]  AGE: 49 y.o. GENDER:  male  :  1970  TODAY'S DATE:  10/29/2021    Subjective:    Fady Beltran is a 49 y.o. male referred by:   [] Physician  [] Nursing  [] Other:     New    Objective    BP (!) 144/88   Pulse 69   Temp 98 °F (36.7 °C) (Oral)   Resp 16   Ht 5' 10\" (1.778 m)   Wt 165 lb 2 oz (74.9 kg)   SpO2 96%   BMI 23.69 kg/m²     Alexandre Risk Score Alexandre Scale Score: 21    Assessment   Surgeon Beth Deng    Ileostomy       Ileostomy Ileostomy RLQ (Active)   Stomal Appliance 2 piece 10/29/21 135   Flange Size (inches) 1.89 Inches 10/29/21 135   Stoma  Assessment Edema;Pink;Protrudes; Moist 10/29/21 135   Mucocutaneous Junction Intact 10/29/21 135   Peristomal Assessment Clean; Intact 10/29/21 1352   Treatment Bag change 10/29/21 1352   Stool Color Green 10/29/21 1352   Stool Appearance Watery 10/29/21 1352   Stool Amount Medium 10/29/21 135   Output (mL) 50 ml 10/29/21 1140   Number of days: 1     Patient has new loop ileostomy. Stoma measures 40 mm wide and 38 mm tall. Stoma is pink, moist and  protrudes 7 mm above the skin line. There is a soft red aline and the distal and proximal openings are in the center. Peristomal skin is intact. The abdominal incision closed with staples is to the left of the stoma . Reviewed ostomy pouch change with patient and his wife. Both watched while Reynaldo Trinh changed appliance. Good seal obtained. All questions were answered. Provided patient and his wife with [facility] medical supply catalog for ostomy supplies. Marked catalog with Coloplast products used here. Explained to patient and his wife that the brand maybe different according to their insurance. CM consult placed, patient has surgical incision, vikki drain and new ostomy.                 Colostomy             Urostomy               Intake/Output Summary (Last 24 hours) at 10/29/2021 1353  Last data filed at 10/29/2021 1200  Gross per 24 hour   Intake 3019.07 ml   Output 1540 ml   Net 1479.07 ml       Plan:   Plan for Ostomy Care: ILEOSTOMY DRESSING CHANGE   1. Gather all supplies for dressing change, make sure new drainage pouch end is closed. 2. Empty pouch. 3. Remove old dressing. 4. Clean stoma and surrounding skin with warm water only- no soap or bath wipes. 5. Dry stoma and surrounding skin & leave dry cloth on stoma to contain any new stool. 6. Measure stoma. 7. Cut new dressing to fit stoma size. Trim small amount of side opposite tab to leave room for midline abdominal incision until incision is healed. 8. Warm new dressing for 30 seconds by rubbing between hands or placing in warm towel. 9. Re-clean & dry stoma & surrounding skin. 10. Apply new dressing with dressing tab facing outer abdomen & trimmed edge  of dressing toward midline abdominal incision. (Save back of dressing for template for next stoma measurement.)   11. Line white adhesive of drainage pouch up with blue [place] on dressing Landing Pad, apply & check for secure attachment with fingers like checking ziploc  bags or tupperware. Ostomy Plan of Care  [x] Supplies/Instructions left in room  [] Patient using home supplies  [x] Brand/supplies at bedside  Coloplast barrier # [id number], pouch #[id number]     Current Diet: ADULT DIET;  Regular  Dietician consult:  No    Discharge Plan:  Placement for patient upon discharge: home with support    Outpatient visit plan No  Supplies given Yes   Samples requested Yes    Referrals:  [x]   [x] [facility]  [x] Supplies  [] Other      Patient/Caregiver Teaching:  Written Instructions given to patient/family: wife at bedside  Teaching provided:  [x] Reviewed GI and A&P        [x] Supplies  [x] Pouch emptying      [x] Manipulate closure  [x] Routine Care         [] Comment  [] Pouch maintenance           Level of patient/caregiver understanding able to:  [x] Indicates understanding       [x] Needs reinforcement  [] Unsuccessful      [x] Verbal Understanding  [] Demonstrated understanding       [] No evidence of learning  [] Refused teaching         [] N/A    Electronically signed by DHARA Horan, RN  Wound/Ostomy Care on 10/29/2021 at 1:55 PM

## 2021-10-29 NOTE — PROGRESS NOTES
[facility] and Laparoscopic [facility]        Post-op Note    Pt Name: Gianni Polanco  MRN: [id number]  YOB: 1970  Date of evaluation: 10/29/2021    Post-op Day #2    Subjective:    No acute events overnight  Pain continues to improve  Tolerating liquids  Enteric contents into ostomy    Vital Signs:  Patient Vitals for the past 24 hrs:   BP Temp Temp src Pulse Resp SpO2 Height   10/29/21 0903 -- -- -- -- -- 95 % --   10/29/21 0740 (!) 151/90 98 °F (36.7 °C) Oral 57 16 95 % --   10/29/21 0436 135/79 98.1 °F (36.7 °C) Oral 79 16 96 % --   10/29/21 0030 128/84 98.1 °F (36.7 °C) Oral 83 16 94 % --   10/28/21 2009 135/85 97.9 °F (36.6 °C) Oral 74 15 97 % --   10/28/21 1635 -- -- -- -- 16 96 % --   10/28/21 1556 139/70 98 °F (36.7 °C) Oral 64 15 95 % --   10/28/21 1432 -- -- -- -- -- -- 5' 10\" (1.778 m)   10/28/21 1310 -- -- -- -- 16 94 % --   10/28/21 1157 134/81 98.2 °F (36.8 °C) Oral 61 16 94 % --      TEMPERATURE HISTORY 24H: Temp (24hrs), Av.1 °F (36.7 °C), Min:97.9 °F (36.6 °C), Max:98.2 °F (36.8 °C)    BLOOD PRESSURE HISTORY: Systolic (90KCR), HBL:419 , Min:128 , JCB:263    Diastolic (92DKO), CHK:29, Min:70, Max:90      Intake/Output:    I/O last 3 completed shifts:   In: 3009.1 [I.V.:2220.4; IV Piggyback:788.7]  Out: 1520 [Urine:1050; Drains:50; Stool:420]  I/O this shift:  In: 10 [I.V.:10]  Out: -   Drain/tube Output:    Closed/Suction Drain Left Abdomen Bulb 19 [place]-Output (ml): 10 ml      Physical Exam:  General: awake, alert, oriented to  person, place, time  Abdomen: soft, non-distended, appropriate incisional tenderness, incision clean dry and intact, ostomy pink viable with enteric output, KARLA serosanguineous    Labs:  CBC:    Recent Labs     10/27/21  0622 10/28/21  0524 10/29/21  05   WBC 17.7* 19.6* 12.7*   HGB 13.4* 12.8* 11.6*   HCT 39.1* 39.4* 35.4*   * 518* 477*     BMP:    Recent Labs     10/27/21  0622 10/28/21  0524 10/29/21  05    133* 139   K 3.7 4.0 3.7    104 107   CO2 23 21 23   BUN 12 11 9   CREATININE 0.5* <0.5* <0.5*   GLUCOSE 100* 139* 106*     Hepatic:   No results for input(s): AST, ALT, ALB, BILITOT, ALKPHOS in the last 72 hours. Amylase: No results for input(s): AMYLASE in the last 72 hours. Lipase: No results for input(s): LIPASE in the last 72 hours. Mag:      Recent Labs     10/28/21  0524   MG 2.20      Phos:     Recent Labs     10/28/21  0524   PHOS 3.6      Coags:   No results for input(s): INR, APTT in the last 72 hours. Cultures:  Anaerobic culture  Recent Labs     10/27/21  1610   LABANAE Anaerobic culture further report to follow       Blood culture  Recent Labs     10/27/21  2026   BC No Growth to date. Any change in status will be called. Blood culture 2  Recent Labs     10/27/21  2026   BLOODCULT2 No Growth to date. Any change in status will be called. Body fluid culture  Recent Labs     10/27/21  2026   BLOODCULT2 No Growth to date. Any change in status will be called. Surgical culture  Recent Labs     10/27/21  1610   CXSURG No growth to date  No growth 36 to 48 hours         Fecal occult  No results for input(s): OCCULTBLDFEC in the last 72 hours. Gram stain  Recent Labs     10/27/21  1610   LABGRAM 3+ WBC's (Polymorphonuclear)  No organisms seen         Stool culture 1  No results for input(s): CXST in the last 72 hours. Stool culture 2  No results for input(s): STOOLCULT2 in the last 72 hours. Urine culture  No results for input(s): LABURIN in the last 72 hours. Wound abscess  No results for input(s): WNDABS in the last 72 hours. C Diff   No results for input(s): CDIFFTOXAB in the last 72 hours.       Pathology:   pending    Imaging:  I have personally reviewed the following films:    CT ABDOMEN PELVIS W IV CONTRAST Additional Contrast? Oral    Result Date: 10/27/2021  EXAMINATION: CT OF THE ABDOMEN AND PELVIS WITH CONTRAST 10/27/2021 11:33 am TECHNIQUE: CT of the abdomen and pelvis was performed with the administration of oral and intravenous contrast. Multiplanar reformatted images are provided for review. Dose modulation, iterative reconstruction, and/or weight based adjustment of the mA/kV was utilized to reduce the radiation dose to as low as reasonably achievable. COMPARISON: CT abdomen and pelvis 10/25/2021 and 10/18/2021 HISTORY: ORDERING SYSTEM PROVIDED HISTORY: followup sigmoid diverticulitis with abscess Acuity: Unknown Type of Exam: Unknown FINDINGS: Lower Chest: Visualized portions of the lungs are unremarkable. Cardiac and posterior mediastinal structures visualized are unremarkable. Organs: Again seen is a relatively hyperattenuating cystic type lesion (61 Hounsfield units (upper pole left kidney, 1.4 x 1.5 cm. Kidneys appear otherwise unremarkable. The liver is fatty with focal area of increased fatty infiltration segment Stanley. No suspicious hepatic lesion or intrahepatic bile duct dilatation evident. Craniocaudal liver length 19 cm. Unremarkable appearance of the spleen, adrenal glands, pancreas and gallbladder. GI/Bowel: Persistent inflammatory changes including areas of wall thickening and pericolonic inflammation centered at the sigmoid colon, with progressing inflammation and fluid collections extending superiorly from the lower left pelvis to the mid and upper pelvis on the left and centrally. Other portions of the colon appear unremarkable, oral contrast reaching the proximal descending colon. The small bowel and stomach appear unremarkable. Normal appearing contrast filled appendix is seen right lower quadrant. Pelvis: Persistent inflammatory changes including areas of wall thickening and pericolonic inflammation centered at the sigmoid colon, with progressing inflammation and fluid collections extending superiorly from the lower left pelvis to the mid and upper pelvis on the left and centrally.   Loculated abscess retroperitoneum of the pelvis axial series 2 image 115, sagittal image 93, measures 2.1 x 6.2 x 2.3 cm with small gas bubbles contained within. This is can fluid with an additional abscess located along the superior portion of the sigmoid colon axial series 2, image 130 measuring 2 x 6.1 cm. This appears can fluid with a 3rd portion of the abscess intimately associated with the anti mesenteric margin of the sigmoid colon (left side of the sigmoid colon (axial series 2, image 141 measuring 3.1 x 3.3 cm, previously 2.3 x 2.5 cm. Peritoneum/Retroperitoneum: Unremarkable appearance of the aorta. No aneurysm. Unremarkable appearance of the IVC. No adenopathy or fluid. No pneumoperitoneum evident. Bones/Soft Tissues: No acute superficial soft tissue or osseous structure abnormality evident. 1. Acute sigmoid diverticulitis with reactive colitis and abscess formation in the pelvis as described above, significantly increased since prior study. 2. Redemonstration of indeterminate lesion upper pole left kidney. Follow-up CT abdomen attention kidneys without and with IV contrast recommended in 3-6 months for additional characterization, versus MRI abdomen attention kidneys without and with gadolinium. 3. Hepatic steatosis. The results were called by Dr. Kathy Mills to [facility] on 10/27/2021 at 12:36. CT ABDOMEN PELVIS W IV CONTRAST Additional Contrast? None    Result Date: 10/25/2021  EXAMINATION: CT OF THE ABDOMEN AND PELVIS WITH CONTRAST 10/25/2021 9:01 am TECHNIQUE: CT of the abdomen and pelvis was performed with the administration of intravenous contrast. Multiplanar reformatted images are provided for review. Dose modulation, iterative reconstruction, and/or weight based adjustment of the mA/kV was utilized to reduce the radiation dose to as low as reasonably achievable.  COMPARISON: 10/18/2021 HISTORY: ORDERING SYSTEM PROVIDED HISTORY: abd pain h/o diverticulitis TECHNOLOGIST PROVIDED HISTORY: Reason for exam:->abd pain h/o diverticulitis Additional Contrast?->None Decision Support Exception - unselect if not a suspected or confirmed emergency medical condition->Emergency Medical Condition (MA) Reason for Exam: abd pain h/o diverticulitis Acuity: Unknown Type of Exam: Unknown FINDINGS: Lower Chest: Lung bases clear Organs: 1.5 cm hyperdense left renal lesion. Low-attenuation of the liver. Remaining solid organs and gallbladder unremarkable GI/Bowel: Colonic diverticula. Persistent inflammatory changes adjacent to the sigmoid colon. New extraluminal bubbles of gas within the perisigmoid inflammatory stranding. 2.5 cm rounded fluid collection adjacent to the lateral margin of the sigmoid without a well-defined wall. No intestinal distension Pelvis: Trace free pelvic fluid. Urinary bladder unremarkable Peritoneum/Retroperitoneum: No ascites or pneumoperitoneum. Normal caliber aorta Bones/Soft Tissues: No bony abnormality     1. Persistent changes of sigmoid diverticulitis, now with findings of locally contained perforation and suspected developing 2.5 cm diverticular abscess 2. 1.5 cm hyperdense left renal lesion. Recommend dedicated renal MRI to assess for mass versus proteinaceous cyst 3. Hepatic steatosis     CT ABDOMEN PELVIS W IV CONTRAST Additional Contrast? None    Result Date: 10/18/2021  EXAMINATION: CT OF THE ABDOMEN AND PELVIS WITH CONTRAST 10/18/2021 6:02 pm TECHNIQUE: CT of the abdomen and pelvis was performed with the administration of intravenous contrast. Multiplanar reformatted images are provided for review. Dose modulation, iterative reconstruction, and/or weight based adjustment of the mA/kV was utilized to reduce the radiation dose to as low as reasonably achievable. COMPARISON: None.  HISTORY: ORDERING SYSTEM PROVIDED HISTORY: RLQ pain TECHNOLOGIST PROVIDED HISTORY: Reason for exam:->RLQ pain Additional Contrast?->None Decision Support Exception - unselect if not a suspected or confirmed emergency medical condition->Emergency Medical Condition (MA) Reason for Exam: RLQ PAIN Acuity: Acute Type of Exam: Initial FINDINGS: Lower Chest: Lung bases clear. Organs: Marked diffuse hepatic steatosis. Unremarkable spleen, pancreas, adrenals, and right kidney. A 1.6 cm cyst in the posterolateral cortex of the left kidney. No definite cholelithiasis. GI/Bowel: Normal appendix. Multiple fluid-filled small bowel and colonic loops, nonspecific finding which can be seen with acute enterocolitis. Distal colonic diverticulosis. Circumferential wall thickening of the mid sigmoid colon with surrounding fat stranding, compatible with acute diverticulitis. Sherryle Moder Pelvis: Incompletely distended urinary bladder. Normal sized prostate. Bilateral small to moderate-sized hydroceles, partially included. Peritoneum/Retroperitoneum: No free air or free fluid. No abdominal or pelvic abscess. No adenopathy. Minimal vascular calcification. No abdominal aortic aneurysm. Bones/Soft Tissues: Small bone island in the superior endplate of L5. No acute osseous abnormality. Acute sigmoid diverticulitis. No free air or peridiverticular abscess. Multiple fluid-filled small bowel and colonic loops, nonspecific finding which can be seen with acute enterocolitis. Normal appendix. Small to moderate-sized bilateral hydroceles partially included.          Scheduled Meds:   ipratropium-albuterol  1 ampule Inhalation Q4H WA    sodium chloride flush  5-40 mL IntraVENous 2 times per day    enoxaparin  40 mg SubCUTAneous Daily    acetaminophen  650 mg Oral Q6H    fluconazole  200 mg IntraVENous Q24H    piperacillin-tazobactam  3,375 mg IntraVENous Q8H    sodium chloride flush  5-40 mL IntraVENous 2 times per day     Continuous Infusions:   sodium chloride      sodium chloride      sodium chloride       PRN Meds:.zolpidem, HYDROmorphone **OR** HYDROmorphone, sodium chloride flush, sodium chloride, promethazine **OR** ondansetron, morphine **OR** morphine, oxyCODONE **OR** oxyCODONE, ketorolac, sodium chloride flush, sodium chloride, ondansetron **OR** ondansetron, polyethylene glycol, acetaminophen **OR** acetaminophen, LORazepam      Assessment:  Patient Active Problem List   Diagnosis    Other fatigue    Other male erectile dysfunction    Cigarette nicotine dependence without complication    [facility]-abdominal abscess [facility] [address]    Acute diverticulitis    Colonic diverticular abscess    Perforated sigmoid colon ([address]    Failure of outpatient treatment    Sepsis ([facility] [address]    Hepatic steatosis    History of open sigmoidectomy     OR Date 10/27/21, Exploratory laparotomy, sigmoidectomy with low pelvic anastomosis, takedown of splenic flexure, and diverting loop ileostomy for severe acute diverticulitis with abscess    Plan:  1. Monitor bowel function, enteric output into ostomy  2. Tolerating liquids, advance to regular diet  3. Pain control, minimize narcotics  4. Monitor and replace electrolytes as needed  5. Antibiotics per ID, KARLA serosanguineous  6. Activity as tolerated  7. Discharge planning, possible tomorrow    Kirill Wren MD, FACS  10/29/2021  10:27 AM

## 2021-10-29 NOTE — PLAN OF CARE
Problem: Pain:  Description: Pain management should include both nonpharmacologic and pharmacologic interventions.   Goal: Pain level will decrease  Description: Pain level will decrease  Outcome: Ongoing  Goal: Control of acute pain  Description: Control of acute pain  Outcome: Ongoing  Goal: Control of chronic pain  Description: Control of chronic pain  Outcome: Ongoing     Problem: Nutrition  Goal: Optimal nutrition therapy  10/29/2021 0330 by Kenan Potter RN  Outcome: Ongoing  10/28/2021 1444 by Ruthy Mireles RD, LD  Outcome: Ongoing     Problem: HEMODYNAMIC STATUS  Goal: Patient has stable vital signs and fluid balance  Outcome: Ongoing     Problem: OXYGENATION/RESPIRATORY FUNCTION  Goal: Patient will achieve/maintain normal respiratory rate/effort  Outcome: Ongoing     Problem: MOBILITY  Goal: Early mobilization is achieved  Outcome: Ongoing     Problem: ELIMINATION  Goal: Elimination patterns are normal or improving  Description: Elimination patterns return to pre-surgery normal patterns  Outcome: Ongoing     Problem: SKIN INTEGRITY  Goal: Skin integrity is maintained or improved  Outcome: Ongoing

## 2021-10-30 LAB
ANION GAP SERPL CALCULATED.3IONS-SCNC: 8 MMOL/L (ref 3–16)
BASOPHILS ABSOLUTE: 0.1 K/UL (ref 0–0.2)
BASOPHILS RELATIVE PERCENT: 0.7 %
BUN BLDV-MCNC: 4 MG/DL (ref 7–20)
CALCIUM SERPL-MCNC: 9.1 MG/DL (ref 8.3–10.6)
CHLORIDE BLD-SCNC: 101 MMOL/L (ref 99–110)
CO2: 26 MMOL/L (ref 21–32)
CREAT SERPL-MCNC: 0.6 MG/DL (ref 0.9–1.3)
EOSINOPHILS ABSOLUTE: 0.2 K/UL (ref 0–0.6)
EOSINOPHILS RELATIVE PERCENT: 2.3 %
GFR AFRICAN AMERICAN: >60
GFR NON-AFRICAN AMERICAN: >60
GLUCOSE BLD-MCNC: 114 MG/DL (ref 70–99)
HCT VFR BLD CALC: 38.9 % (ref 40.5–52.5)
HEMOGLOBIN: 12.7 G/DL (ref 13.5–17.5)
LYMPHOCYTES ABSOLUTE: 1.7 K/UL (ref 1–5.1)
LYMPHOCYTES RELATIVE PERCENT: 15.9 %
MCH RBC QN AUTO: 31.8 PG (ref 26–34)
MCHC RBC AUTO-ENTMCNC: 32.7 G/DL (ref 31–36)
MCV RBC AUTO: 97 FL (ref 80–100)
MONOCYTES ABSOLUTE: 0.9 K/UL (ref 0–1.3)
MONOCYTES RELATIVE PERCENT: 8.3 %
NEUTROPHILS ABSOLUTE: 7.7 K/UL (ref 1.7–7.7)
NEUTROPHILS RELATIVE PERCENT: 72.8 %
PDW BLD-RTO: 13.8 % (ref 12.4–15.4)
PLATELET # BLD: 543 K/UL (ref 135–450)
PMV BLD AUTO: 8.6 FL (ref 5–10.5)
POTASSIUM SERPL-SCNC: 3.7 MMOL/L (ref 3.5–5.1)
RBC # BLD: 4 M/UL (ref 4.2–5.9)
SODIUM BLD-SCNC: 135 MMOL/L (ref 136–145)
WBC # BLD: 10.6 K/UL (ref 4–11)

## 2021-10-30 PROCEDURE — 6360000002 HC RX W HCPCS: Performed by: INTERNAL MEDICINE

## 2021-10-30 PROCEDURE — 6360000002 HC RX W HCPCS: Performed by: SURGERY

## 2021-10-30 PROCEDURE — 1200000000 HC SEMI PRIVATE

## 2021-10-30 PROCEDURE — 2580000003 HC RX 258: Performed by: INTERNAL MEDICINE

## 2021-10-30 PROCEDURE — 94640 AIRWAY INHALATION TREATMENT: CPT

## 2021-10-30 PROCEDURE — 6370000000 HC RX 637 (ALT 250 FOR IP): Performed by: SURGERY

## 2021-10-30 PROCEDURE — 6370000000 HC RX 637 (ALT 250 FOR IP): Performed by: NURSE PRACTITIONER

## 2021-10-30 PROCEDURE — 36415 COLL VENOUS BLD VENIPUNCTURE: CPT

## 2021-10-30 PROCEDURE — 94761 N-INVAS EAR/PLS OXIMETRY MLT: CPT

## 2021-10-30 PROCEDURE — 99024 POSTOP FOLLOW-UP VISIT: CPT | Performed by: SURGERY

## 2021-10-30 PROCEDURE — 2580000003 HC RX 258: Performed by: SURGERY

## 2021-10-30 PROCEDURE — 85025 COMPLETE CBC W/AUTO DIFF WBC: CPT

## 2021-10-30 PROCEDURE — 80048 BASIC METABOLIC PNL TOTAL CA: CPT

## 2021-10-30 PROCEDURE — 6370000000 HC RX 637 (ALT 250 FOR IP): Performed by: PHYSICIAN ASSISTANT

## 2021-10-30 RX ADMIN — Medication 10 ML: at 10:16

## 2021-10-30 RX ADMIN — ACETAMINOPHEN 650 MG: 325 TABLET ORAL at 13:37

## 2021-10-30 RX ADMIN — ACETAMINOPHEN 650 MG: 325 TABLET ORAL at 09:26

## 2021-10-30 RX ADMIN — OXYCODONE 10 MG: 5 TABLET ORAL at 12:45

## 2021-10-30 RX ADMIN — IPRATROPIUM BROMIDE AND ALBUTEROL SULFATE 1 AMPULE: .5; 3 SOLUTION RESPIRATORY (INHALATION) at 09:06

## 2021-10-30 RX ADMIN — PIPERACILLIN AND TAZOBACTAM 3375 MG: 3; .375 INJECTION, POWDER, LYOPHILIZED, FOR SOLUTION INTRAVENOUS at 09:31

## 2021-10-30 RX ADMIN — OXYCODONE 10 MG: 5 TABLET ORAL at 01:30

## 2021-10-30 RX ADMIN — HYDROMORPHONE HYDROCHLORIDE 1 MG: 1 INJECTION, SOLUTION INTRAMUSCULAR; INTRAVENOUS; SUBCUTANEOUS at 10:27

## 2021-10-30 RX ADMIN — ENOXAPARIN SODIUM 40 MG: 100 INJECTION SUBCUTANEOUS at 09:26

## 2021-10-30 RX ADMIN — LORAZEPAM 0.5 MG: 2 INJECTION INTRAMUSCULAR; INTRAVENOUS at 23:23

## 2021-10-30 RX ADMIN — Medication 10 ML: at 20:37

## 2021-10-30 RX ADMIN — ACETAMINOPHEN 650 MG: 325 TABLET ORAL at 01:30

## 2021-10-30 RX ADMIN — LORAZEPAM 0.5 MG: 2 INJECTION INTRAMUSCULAR; INTRAVENOUS at 13:37

## 2021-10-30 RX ADMIN — HYDROMORPHONE HYDROCHLORIDE 1 MG: 1 INJECTION, SOLUTION INTRAMUSCULAR; INTRAVENOUS; SUBCUTANEOUS at 16:34

## 2021-10-30 RX ADMIN — OXYCODONE 10 MG: 5 TABLET ORAL at 20:07

## 2021-10-30 RX ADMIN — ACETAMINOPHEN 650 MG: 325 TABLET ORAL at 20:07

## 2021-10-30 RX ADMIN — Medication 10 ML: at 20:36

## 2021-10-30 RX ADMIN — FLUCONAZOLE 200 MG: 200 INJECTION, SOLUTION INTRAVENOUS at 16:38

## 2021-10-30 RX ADMIN — PIPERACILLIN AND TAZOBACTAM 3375 MG: 3; .375 INJECTION, POWDER, LYOPHILIZED, FOR SOLUTION INTRAVENOUS at 01:34

## 2021-10-30 RX ADMIN — OXYCODONE 10 MG: 5 TABLET ORAL at 06:32

## 2021-10-30 RX ADMIN — ERTAPENEM SODIUM 1000 MG: 1 INJECTION, POWDER, LYOPHILIZED, FOR SOLUTION INTRAMUSCULAR; INTRAVENOUS at 13:57

## 2021-10-30 RX ADMIN — IPRATROPIUM BROMIDE AND ALBUTEROL SULFATE 1 AMPULE: .5; 3 SOLUTION RESPIRATORY (INHALATION) at 15:39

## 2021-10-30 RX ADMIN — SODIUM CHLORIDE, PRESERVATIVE FREE 10 ML: 5 INJECTION INTRAVENOUS at 20:36

## 2021-10-30 RX ADMIN — ZOLPIDEM TARTRATE 5 MG: 5 TABLET ORAL at 20:07

## 2021-10-30 ASSESSMENT — PAIN SCALES - GENERAL
PAINLEVEL_OUTOF10: 8
PAINLEVEL_OUTOF10: 7
PAINLEVEL_OUTOF10: 8
PAINLEVEL_OUTOF10: 7
PAINLEVEL_OUTOF10: 7
PAINLEVEL_OUTOF10: 8

## 2021-10-30 ASSESSMENT — PAIN DESCRIPTION - PAIN TYPE: TYPE: SURGICAL PAIN

## 2021-10-30 ASSESSMENT — PAIN DESCRIPTION - ORIENTATION: ORIENTATION: LOWER

## 2021-10-30 ASSESSMENT — PAIN DESCRIPTION - LOCATION
LOCATION: ABDOMEN
LOCATION: ABDOMEN

## 2021-10-30 NOTE — PROGRESS NOTES
Nutrition Education    · Verbally reviewed information with Patient & spouse  · Educated on Diet guidelines for ileostomy  · Written educational materials provided. · Contact name and number provided. · Refer to Patient Education activity for more details.     Electronically signed by Rony Bush RD, LD on 10/30/21 at 12:57 PM EDT    Contact: [phone]

## 2021-10-30 NOTE — PLAN OF CARE
Problem: Pain:  Goal: Pain level will decrease  Description: Pain level will decrease  Outcome: Ongoing  Goal: Control of acute pain  Description: Control of acute pain  Outcome: Ongoing  Goal: Control of chronic pain  Description: Control of chronic pain  Outcome: Ongoing     Problem: Nutrition  Goal: Optimal nutrition therapy  Outcome: Ongoing     Problem: HEMODYNAMIC STATUS  Goal: Patient has stable vital signs and fluid balance  Outcome: Ongoing     Problem: OXYGENATION/RESPIRATORY FUNCTION  Goal: Patient will achieve/maintain normal respiratory rate/effort  Outcome: Ongoing     Problem: MOBILITY  Goal: Early mobilization is achieved  Outcome: Ongoing     Problem: ELIMINATION  Goal: Elimination patterns are normal or improving  Description: Elimination patterns return to pre-surgery normal patterns  Outcome: Ongoing     Problem: SKIN INTEGRITY  Goal: Skin integrity is maintained or improved  Outcome: Ongoing  Intervention: [facility]ABORATE WITH [facility]  10/29/2021 1417 by Faisal Gomez RN  Note:   Plan for Ostomy Care: EMPTY POUCH WHEN 1/3 TO 1/2 FULL WITH GAS OR OUTPUT   CHANGE POUCH EVERY 3 TO 4 DAYS OR IF LEAKING. ILEOSTOMY DRESSING CHANGE    1. Gather all supplies for dressing change, make sure new drainage pouch end is closed. 2. Empty pouch. 3. Remove old dressing. 4. Clean stoma and surrounding skin with warm water only- no soap or bath wipes. 5. Dry stoma and surrounding skin & leave dry cloth on stoma to contain any new stool. 6. Measure stoma. 7. Cut new dressing to fit stoma size. Trim small amount of side opposite tab to leave room for midline abdominal incision until incision is healed. 8. Warm new dressing for 30 seconds by rubbing between hands or placing in warm towel. 9. Re-clean & dry stoma & surrounding skin. 10. Apply new dressing with dressing tab facing outer abdomen & trimmed edge  of dressing toward midline abdominal incision.  (Save back of dressing for template for next stoma measurement.)    11. Line white adhesive of drainage pouch up with blue [place] on dressing Landing Pad, apply & check for secure attachment with fingers like checking ziploc  bags or tupperware.           Ostomy Plan of Care     Brand/supplies at bedside  Coloplast barrier # [id number], pouch #[id number]     Problem: Falls - Risk of:  Goal: Will remain free from falls  Description: Will remain free from falls  Outcome: Ongoing  Goal: Absence of physical injury  Description: Absence of physical injury  Outcome: Ongoing

## 2021-10-30 NOTE — CARE COORDINATION
Discharge planning note:    IV antibiotic infusions arranged through:    [address]  Phone: [phone]  Fax: [phone]    [address] services arranged through:    [facility]  PHONE; [phone]  FAX: [phone]    No further DC needs.     Fiona Alejandro RN BSN  Case Management  [phone]

## 2021-10-30 NOTE — PROGRESS NOTES
[facility]    10/30/2021 2:26 PM        Name: David Mustafa . Admitted: 10/25/2021  Primary Care Provider: Treasure Marquez MD (Tel: [phone])      Subjective:  . Patient admitted with diverticulitis with small abscess. Developed increasing pain. Repeat CT revealed worsening abscess. Taken to OR by Dr Maliha Colon on 10/27. He had an Exploratory laparotomy, sigmoidectomy with low pelvic anastomosis, takedown of splenic flexure, diverting loop ileostomy. Patient seen this am. Feeling much better controlled. No new complaints. Wants to go home.      Reviewed interval ancillary notes    Current Medications  ertapenem (INVanz) 1000 mg IVPB minibag, Once  zolpidem (AMBIEN) tablet 5 mg, Nightly PRN  lidocaine PF 1 % injection 5 mL, Once  sodium chloride flush 0.9 % injection 5-40 mL, 2 times per day  sodium chloride flush 0.9 % injection 5-40 mL, PRN  0.9 % sodium chloride infusion, PRN  ipratropium-albuterol (DUONEB) nebulizer solution 1 ampule, Q4H WA  HYDROmorphone HCl PF (DILAUDID) injection 0.5 mg, Q2H PRN   Or  HYDROmorphone HCl PF (DILAUDID) injection 1 mg, Q2H PRN  sodium chloride flush 0.9 % injection 5-40 mL, 2 times per day  sodium chloride flush 0.9 % injection 5-40 mL, PRN  0.9 % sodium chloride infusion, PRN  enoxaparin (LOVENOX) injection 40 mg, Daily  promethazine (PHENERGAN) tablet 12.5 mg, Q6H PRN   Or  ondansetron (ZOFRAN) injection 4 mg, Q6H PRN  morphine (PF) injection 2 mg, Q2H PRN   Or  morphine (PF) injection 4 mg, Q2H PRN  oxyCODONE (ROXICODONE) immediate release tablet 5 mg, Q4H PRN   Or  oxyCODONE (ROXICODONE) immediate release tablet 10 mg, Q4H PRN  acetaminophen (TYLENOL) tablet 650 mg, Q6H  fluconazole (DIFLUCAN) in 0.9 % sodium chloride IVPB 200 mg, Q24H  sodium chloride flush 0.9 % injection 5-40 mL, 2 times per day  sodium chloride flush 0.9 % injection 5-40 mL, PRN  0.9 % sodium chloride infusion, PRN  ondansetron (ZOFRAN-ODT) disintegrating tablet 4 mg, Q6H PRN   Or  ondansetron (ZOFRAN) injection 4 mg, Q6H PRN  polyethylene glycol (GLYCOLAX) packet 17 g, Daily PRN  acetaminophen (TYLENOL) tablet 650 mg, Q6H PRN   Or  acetaminophen (TYLENOL) suppository 650 mg, Q6H PRN  LORazepam (ATIVAN) injection 0.5 mg, Q6H PRN        Objective:  /85   Pulse 66   Temp 98.5 °F (36.9 °C) (Oral)   Resp 16   Ht 5' 10\" (1.778 m)   Wt 165 lb 2 oz (74.9 kg)   SpO2 95%   BMI 23.69 kg/m²     Intake/Output Summary (Last 24 hours) at 10/30/2021 1426  Last data filed at 10/30/2021 0931  Gross per 24 hour   Intake 840 ml   Output 700 ml   Net 140 ml      Wt Readings from Last 3 Encounters:   10/25/21 165 lb 2 oz (74.9 kg)   01/04/21 173 lb 3.2 oz (78.6 kg)       General appearance:  Appears comfortable  Eyes: Sclera clear. Pupils equal.  ENT: Moist oral mucosa. Trachea midline, no adenopathy. Cardiovascular: Regular rhythm, normal S1, S2. No murmur. No edema in lower extremities  Respiratory: Not using accessory muscles. Good inspiratory effort. Wheezing B. GI: Abdomen soft, no tenderness, not distended, normal bowel sounds, stoma pink, stool in bag  Musculoskeletal: No cyanosis in digits, neck supple  Neurology: CN 2-12 grossly intact. No speech or motor deficits  Psych: Normal affect. Alert and oriented in time, place and person  Skin: Warm, dry, normal turgor    Labs and Tests:  CBC:   Recent Labs     10/28/21  0524 10/29/21  0557 10/30/21  1030   WBC 19.6* 12.7* 10.6   HGB 12.8* 11.6* 12.7*   * 477* 543*     BMP:    Recent Labs     10/28/21  0524 10/29/21  0557 10/30/21  1030   * 139 135*   K 4.0 3.7 3.7    107 101   CO2 21 23 26   BUN 11 9 4*   CREATININE <0.5* <0.5* 0.6*   GLUCOSE 139* 106* 114*     Hepatic:   No results for input(s): AST, ALT, ALB, BILITOT, ALKPHOS in the last 72 hours. CT abd/pelvis  1.  Persistent changes of sigmoid diverticulitis, now with findings of locally   contained perforation and suspected developing 2.5 cm diverticular abscess   2. 1.5 cm hyperdense left renal lesion.  Recommend dedicated renal MRI to   assess for mass versus proteinaceous cyst   3. Hepatic steatosis         Problem List  Active Problems:    Complex care coordination    Intra-abdominal abscess (HCC)    Acute diverticulitis    Colonic diverticular abscess    Perforated sigmoid colon ([address]    Failure of outpatient treatment    Sepsis ([facility] [address]    Hepatic steatosis    History of open sigmoidectomy    Receiving intravenous antibiotic treatment as outpatient  Resolved Problems:    * No resolved hospital problems. *       Assessment & Plan:   1. Perforated diverticulitis abscess- s/p Exploratory laparotomy, sigmoidectomy with low pelvic anastomosis, takedown of splenic flexure, diverting loop ileostomy. Doing well, leukocytosis resolved. Dc if ok with surgery. 2. Alcoholism-patient has not had any alcohol the week prior to coming in. Has ativan prn. Counseled. Diet: ADULT DIET;  Regular  Code:Full Code  DVT PPX: nikki Pradhan PA-C   10/30/2021 2:26 PM

## 2021-10-30 NOTE — DISCHARGE SUMMARY
[id number]    Patient Demographics    Patient. Gianni Polanco  Date of Birth. 1970  MRN. [id number]     Primary care provider. David Vaughn MD  (Tel: [phone])    Admit date: 10/25/2021    Discharge date (blank if same as Note Date): Note Date: 10/30/2021     Reason for Hospitalization. Chief Complaint   Patient presents with    Abdominal Pain     Pt in with c/o abd pain x1 week. Pt states was here one week ago for same complaint. Reports no BM for one week          Significant Findings. Active Problems:    Complex care coordination    Intra-abdominal abscess ([address]    Acute diverticulitis    Colonic diverticular abscess    Perforated sigmoid colon ([address]    Failure of outpatient treatment    Sepsis ([address]    Hepatic steatosis    History of open sigmoidectomy    Receiving intravenous antibiotic treatment as outpatient  Resolved Problems:    * No resolved hospital problems. *       Problems and results from this hospitalization that need follow up. 1. Postop follow-up    Significant test results and incidental findings. CT abdomen pelvis October 25  1. Persistent changes of sigmoid diverticulitis, now with findings of locally   contained perforation and suspected developing 2.5 cm diverticular abscess   2. 1.5 cm hyperdense left renal lesion.  Recommend dedicated renal MRI to   assess for mass versus proteinaceous cyst   3. Hepatic steatosis       CT abdomen and pelvis on October 27  1. Acute sigmoid diverticulitis with reactive colitis and abscess formation   in the pelvis as described above, significantly increased since prior study.    2. Redemonstration of indeterminate lesion upper pole left kidney.  Follow-up   CT abdomen attention kidneys without and with IV contrast recommended in 3-6   months for additional characterization, versus MRI abdomen attention kidneys without and with gadolinium. 3. Hepatic steatosis. Invasive procedures and treatments. 1. Exploratory laparotomy with sigmoidectomy with low pelvic anastomosis, takedown of splenic flexure, diverting loop ileostomy by Dr Sarika Ellison 10/27    [facility]. Patient is a 73-year-old male who presented with a 1 week history of abdominal pain. In the emergency room he was febrile and had a white count of 15,000. He had been on oral antibiotics for the past week when he was diagnosed in the ED with uncomplicated diverticulitis and discharged home. Repeat CT scan revealed a locally contained perforation with small abscess measuring 2.5 cm. Patient was admitted and started on broad-spectrum antibiotics. His pain continued to worsen. His white count went up to 17,000. Repeat CT scan on October 27 revealed significantly increased and reactive colitis abscess formation. Patient was taken to the operating room by Dr. Sarika Ellison on 10/27. Patient an uncomplicated postoperative course. He was seen by infectious disease during his stay who recommended patient continue IV Invanz and Diflucan at discharge. Antibiotics will be completed on November 15. Patient was tolerating a diet and has received ostomy teaching prior to discharge. He was discharged home in stable condition. Consults. IP CONSULT TO INFECTIOUS DISEASES  IP CONSULT TO SOCIAL WORK  IP CONSULT TO DIETITIAN  IP CONSULT TO HOME CARE NEEDS    Physical examination on discharge day. BP (!) 161/93   Pulse 70   Temp 98.3 °F (36.8 °C) (Oral)   Resp 16   Ht 5' 10\" (1.778 m)   Wt 165 lb 2 oz (74.9 kg)   SpO2 98%   BMI 23.69 kg/m²   General appearance. Alert. Looks comfortable. HEENT. Sclera clear. Moist mucus membranes. Cardiovascular. Regular rate and rhythm, normal S1, S2. No murmur. Respiratory. Not using accessory muscles. Clear to auscultation bilaterally, no wheeze.   Gastrointestinal. Abdomen soft, non-tender, not distended, normal bowel sounds  Neurology. Facial symmetry. No speech deficits. Moving all extremities equally. Extremities. No edema in lower extremities. Skin. Warm, dry, normal turgor    Condition at time of discharge stable    Medication instructions provided to patient at discharge. Medication List      CONTINUE taking these medications    ondansetron 4 MG disintegrating tablet  Commonly known as: Zofran ODT  Take 1 tablet by mouth every 8 hours as needed for Nausea     sildenafil 20 MG tablet  Commonly known as: REVATIO  Take only 1 to 2 pills every 3 days as needed        STOP taking these medications    ciprofloxacin 500 MG tablet  Commonly known as: Cipro     metroNIDAZOLE 500 MG tablet  Commonly known as: Flagyl            Discharge recommendations given to patient. Follow Up. Dr Brandan Jay in 1 week   Disposition. home  Activity. no heavy lifting, pushing, pulling  Diet: ADULT DIET; Regular      Spent 38 minutes in discharge process. Signed:   Tirso Abbott PA-C     10/30/2021 12:36 PM

## 2021-10-30 NOTE — PROGRESS NOTES
2000:  PM shift assessment complete, see flow sheets. Medications given per orders, see MAR. Pt A&O, denies needs at this time. The care plan and education has been reviewed and mutually agreed upon with the patient.

## 2021-10-30 NOTE — PROGRESS NOTES
Marry Branch is a 49 y.o. male patient.     Current Facility-Administered Medications   Medication Dose Route Frequency Provider Last Rate Last Admin    ertapenem (INVanz) 1000 mg IVPB minibag  1,000 mg IntraVENous Once Telma Yu MD        0.9 % sodium chloride infusion   IntraVENous Continuous Socorro Becki APREVARISTO - CNP 50 mL/hr at 10/29/21 1134 New Bag at 10/29/21 1134    zolpidem (AMBIEN) tablet 5 mg  5 mg Oral Nightly PRN Socorro Connell, APRN - CNP   5 mg at 10/29/21 2000    lidocaine PF 1 % injection 5 mL  5 mL IntraDERmal Once Telma Yu MD        sodium chloride flush 0.9 % injection 5-40 mL  5-40 mL IntraVENous 2 times per day Telma Yu MD        sodium chloride flush 0.9 % injection 5-40 mL  5-40 mL IntraVENous PRN Telma Yu MD        0.9 % sodium chloride infusion  25 mL IntraVENous PRN Telma Yu MD        ipratropium-albuterol (DUONEB) nebulizer solution 1 ampule  1 ampule Inhalation Q4H WA Harman Dunne PA-C   1 ampule at 10/30/21 0906    HYDROmorphone HCl PF (DILAUDID) injection 0.5 mg  0.5 mg IntraVENous Q2H PRN Douglas Serna MD        Or    HYDROmorphone HCl PF (DILAUDID) injection 1 mg  1 mg IntraVENous Q2H PRN Douglas Serna MD   1 mg at 10/30/21 1027    sodium chloride flush 0.9 % injection 5-40 mL  5-40 mL IntraVENous 2 times per day Douglas Serna MD   10 mL at 10/30/21 1016    sodium chloride flush 0.9 % injection 5-40 mL  5-40 mL IntraVENous PRN Douglas Serna MD        0.9 % sodium chloride infusion  25 mL IntraVENous PRN Douglas Serna MD        enoxaparin (LOVENOX) injection 40 mg  40 mg SubCUTAneous Daily Douglas Serna MD   40 mg at 10/30/21 0926    promethazine (PHENERGAN) tablet 12.5 mg  12.5 mg Oral Q6H PRN Douglas Serna MD        Or    [facility]ansetron [facility] injection 4 mg  4 mg IntraVENous Q6H PRN Douglas Serna MD        morphine (PF) injection 2 mg  2 mg IntraVENous Q2H PRN Douglas Serna MD        Or    morphine (PF) injection 4 mg  4 mg IntraVENous Q2H PRN La Engle MD        oxyCODONE (ROXICODONE) immediate release tablet 5 mg  5 mg Oral Q4H PRN La Engle MD        Or    oxyCODONE (ROXICODONE) immediate release tablet 10 mg  10 mg Oral Q4H PRN La Engle MD   10 mg at 10/30/21 1245    acetaminophen (TYLENOL) tablet 650 mg  650 mg Oral Q6H La Engle MD   650 mg at 10/30/21 0926    fluconazole (DIFLUCAN) in 0.9 % sodium chloride IVPB 200 mg  200 mg IntraVENous Q24H La Engle  mL/hr at 10/29/21 1512 200 mg at 10/29/21 1512    sodium chloride flush 0.9 % injection 5-40 mL  5-40 mL IntraVENous 2 times per day La Engle MD   10 mL at 10/30/21 1016    sodium chloride flush 0.9 % injection 5-40 mL  5-40 mL IntraVENous PRN La Engle MD        0.9 % sodium chloride infusion  25 mL IntraVENous PRN La Engle MD        ondansetron (ZOFRAN-ODT) disintegrating tablet 4 mg  4 mg Oral Q6H PRN La Engle MD        Or    [facility]dansetron [facility]) injection 4 mg  4 mg IntraVENous Q6H PRN La Engle MD        polyethylene glycol [facility]) packet 17 g  17 g Oral Daily PRN La Engle MD        acetaminophen (TYLENOL) tablet 650 mg  650 mg Oral Q6H PRN La Engle MD   650 mg at 10/29/21 0030    Or    acetaminophen (TYLENOL) suppository 650 mg  650 mg Rectal Q6H PRN La Engle MD        LORazepam (ATIVAN) injection 0.5 mg  0.5 mg IntraVENous Q6H PRN La Engle MD   0.5 mg at 10/29/21 1747     No Known Allergies  Active Problems:    Complex care coordination    Intra-abdominal abscess [facility])    Acute diverticulitis    Colonic diverticular abscess    Perforated sigmoid colon ([facility] [address]    Failure of outpatient treatment    Sepsis ([facility] [address]    Hepatic steatosis    History of open sigmoidectomy    Receiving intravenous antibiotic treatment as outpatient  Resolved Problems:    * No resolved hospital problems.  *    Blood pressure (!) 161/93, pulse 70, temperature 98.3 °F (36.8 °C), temperature source Oral, resp. rate 16, height 5' 10\" (1.778 m), weight 165 lb 2 oz (74.9 kg), SpO2 98 %. Subjective:  Symptoms:  Stable. Diet:  Adequate intake. Activity level: Normal.    Pain:  He complains of pain that is mild. Objective:  General Appearance:  Comfortable. Vital signs: (most recent): Blood pressure (!) 161/93, pulse 70, temperature 98.3 °F (36.8 °C), temperature source Oral, resp. rate 16, height 5' 10\" (1.778 m), weight 165 lb 2 oz (74.9 kg), SpO2 98 %. Output: Producing urine. Stool output assessment: having ileostomy output. Lungs:  Normal effort and normal respiratory rate. Abdomen: Abdomen is soft and non-distended. (Incision approximated without evidence of infection). Neurological: Patient is alert and oriented to person, place and time. Skin:  Warm and dry. Assessment & Plan 49year old male who is POD # 3 S/P sigmoid colon resection with coloproctostomy and diverting loop ileostomy. Doing well. Tolerating diet. HL IVF. Continue IV antibiotics. Aiming for discharge tomorrow.      Adore Dunham MD  10/30/2021

## 2021-10-30 NOTE — PLAN OF CARE
Problem: Pain:  Goal: Pain level will decrease  Description: Pain level will decrease  10/30/2021 1551 by Hanna Newby RN  Outcome: Ongoing  Note: C/o pain, administered oxy and Dilaudid. Will continue to monitor.       Problem: HEMODYNAMIC STATUS  Goal: Patient has stable vital signs and fluid balance  10/30/2021 1551 by Hanna Newby RN  Outcome: Ongoing     Problem: SKIN INTEGRITY  Goal: Skin integrity is maintained or improved  10/30/2021 1551 by Hanna Newby RN  Outcome: Ongoing

## 2021-10-31 VITALS
SYSTOLIC BLOOD PRESSURE: 133 MMHG | BODY MASS INDEX: 23.64 KG/M2 | RESPIRATION RATE: 16 BRPM | TEMPERATURE: 98.5 F | HEIGHT: 70 IN | OXYGEN SATURATION: 97 % | HEART RATE: 71 BPM | DIASTOLIC BLOOD PRESSURE: 83 MMHG | WEIGHT: 165.12 LBS

## 2021-10-31 LAB
ANION GAP SERPL CALCULATED.3IONS-SCNC: 11 MMOL/L (ref 3–16)
BANDED NEUTROPHILS RELATIVE PERCENT: 5 % (ref 0–7)
BASOPHILS ABSOLUTE: 0 K/UL (ref 0–0.2)
BASOPHILS RELATIVE PERCENT: 0 %
BLOOD CULTURE, ROUTINE: NORMAL
BUN BLDV-MCNC: 4 MG/DL (ref 7–20)
CALCIUM SERPL-MCNC: 8.8 MG/DL (ref 8.3–10.6)
CHLORIDE BLD-SCNC: 98 MMOL/L (ref 99–110)
CO2: 26 MMOL/L (ref 21–32)
CREAT SERPL-MCNC: 0.5 MG/DL (ref 0.9–1.3)
CULTURE, BLOOD 2: NORMAL
EOSINOPHILS ABSOLUTE: 0.3 K/UL (ref 0–0.6)
EOSINOPHILS RELATIVE PERCENT: 3 %
GFR AFRICAN AMERICAN: >60
GFR NON-AFRICAN AMERICAN: >60
GLUCOSE BLD-MCNC: 100 MG/DL (ref 70–99)
HCT VFR BLD CALC: 34.1 % (ref 40.5–52.5)
HEMOGLOBIN: 11.6 G/DL (ref 13.5–17.5)
LYMPHOCYTES ABSOLUTE: 1 K/UL (ref 1–5.1)
LYMPHOCYTES RELATIVE PERCENT: 10 %
MCH RBC QN AUTO: 32.2 PG (ref 26–34)
MCHC RBC AUTO-ENTMCNC: 34 G/DL (ref 31–36)
MCV RBC AUTO: 94.5 FL (ref 80–100)
MONOCYTES ABSOLUTE: 1.2 K/UL (ref 0–1.3)
MONOCYTES RELATIVE PERCENT: 12 %
NEUTROPHILS ABSOLUTE: 7.4 K/UL (ref 1.7–7.7)
NEUTROPHILS RELATIVE PERCENT: 70 %
PDW BLD-RTO: 13.2 % (ref 12.4–15.4)
PLATELET # BLD: 388 K/UL (ref 135–450)
PMV BLD AUTO: 7.4 FL (ref 5–10.5)
POTASSIUM SERPL-SCNC: 4.3 MMOL/L (ref 3.5–5.1)
RBC # BLD: 3.61 M/UL (ref 4.2–5.9)
RBC # BLD: NORMAL 10*6/UL
SODIUM BLD-SCNC: 135 MMOL/L (ref 136–145)
WBC # BLD: 9.9 K/UL (ref 4–11)

## 2021-10-31 PROCEDURE — 6360000002 HC RX W HCPCS: Performed by: SURGERY

## 2021-10-31 PROCEDURE — 2580000003 HC RX 258: Performed by: INTERNAL MEDICINE

## 2021-10-31 PROCEDURE — 85025 COMPLETE CBC W/AUTO DIFF WBC: CPT

## 2021-10-31 PROCEDURE — 2580000003 HC RX 258: Performed by: SURGERY

## 2021-10-31 PROCEDURE — 80048 BASIC METABOLIC PNL TOTAL CA: CPT

## 2021-10-31 PROCEDURE — 99024 POSTOP FOLLOW-UP VISIT: CPT | Performed by: SURGERY

## 2021-10-31 PROCEDURE — 6370000000 HC RX 637 (ALT 250 FOR IP): Performed by: SURGERY

## 2021-10-31 RX ORDER — OXYCODONE HYDROCHLORIDE AND ACETAMINOPHEN 5; 325 MG/1; MG/1
1-2 TABLET ORAL EVERY 4 HOURS PRN
Qty: 20 TABLET | Refills: 0 | Status: SHIPPED | OUTPATIENT
Start: 2021-10-31 | End: 2021-11-03

## 2021-10-31 RX ADMIN — SODIUM CHLORIDE, PRESERVATIVE FREE 10 ML: 5 INJECTION INTRAVENOUS at 09:09

## 2021-10-31 RX ADMIN — OXYCODONE 10 MG: 5 TABLET ORAL at 12:14

## 2021-10-31 RX ADMIN — OXYCODONE 10 MG: 5 TABLET ORAL at 06:44

## 2021-10-31 RX ADMIN — ACETAMINOPHEN 650 MG: 325 TABLET ORAL at 09:08

## 2021-10-31 RX ADMIN — OXYCODONE 10 MG: 5 TABLET ORAL at 02:48

## 2021-10-31 RX ADMIN — ENOXAPARIN SODIUM 40 MG: 100 INJECTION SUBCUTANEOUS at 09:08

## 2021-10-31 RX ADMIN — Medication 10 ML: at 09:09

## 2021-10-31 RX ADMIN — ACETAMINOPHEN 650 MG: 325 TABLET ORAL at 02:48

## 2021-10-31 ASSESSMENT — PAIN SCALES - GENERAL
PAINLEVEL_OUTOF10: 7

## 2021-10-31 NOTE — PROGRESS NOTES
Pt discharged home with home care via private car with wife. D/c instructions reviewed with pt and wife. Both massimo.

## 2021-10-31 NOTE — PROGRESS NOTES
2000:  PM shift assessment complete, see flow sheets. Medications given per orders, see MAR. Pt. A&O x4, dressing covering KARLA drain insertions site changed R/T Serous drainage. Stripped KARLA drain tubing per orders and recovered with dry dressing. Pt. Tolerated well. The care plan and education has been reviewed and mutually agreed upon with the patient.

## 2021-10-31 NOTE — CARE COORDINATION
Discharge planning note:    [facility] has already delivered medication and supplies. Confirmed with Fransico Mccarty @ [facility] their nursing services will start tomorrow.     Omid James RN BSN  Case Management  [phone]

## 2021-10-31 NOTE — PROGRESS NOTES
AM assessment complete, VSS, medications given per [facility], PICC flushed with good blood return, significant other at bedside. The care plan and education has been reviewed and mutually agreed upon with the patient, call light within reach.

## 2021-10-31 NOTE — PLAN OF CARE
Problem: Pain:  Goal: Pain level will decrease  Description: Pain level will decrease  10/30/2021 2311 by Carrie Parisi RN  Outcome: Ongoing  10/30/2021 1551 by Mame Andrea RN  Outcome: Ongoing  Note: C/o pain, administered oxy and Dilaudid. Will continue to monitor.    Goal: Control of acute pain  Description: Control of acute pain  10/30/2021 2311 by Crarie Parisi RN  Outcome: Ongoing  10/30/2021 1551 by Mame Andrea RN  Outcome: Ongoing  Goal: Control of chronic pain  Description: Control of chronic pain  10/30/2021 2311 by Carrie Parisi RN  Outcome: Ongoing  10/30/2021 1551 by Mame Andrea RN  Outcome: Ongoing     Problem: Nutrition  Goal: Optimal nutrition therapy  10/30/2021 2311 by Carrie Parisi RN  Outcome: Ongoing  10/30/2021 1551 by Mame Andrea RN  Outcome: Ongoing     Problem: HEMODYNAMIC STATUS  Goal: Patient has stable vital signs and fluid balance  10/30/2021 2311 by Carrie Parisi RN  Outcome: Ongoing  10/30/2021 1551 by Mame Andrea RN  Outcome: Ongoing     Problem: OXYGENATION/RESPIRATORY FUNCTION  Goal: Patient will achieve/maintain normal respiratory rate/effort  10/30/2021 2311 by Carrie Parisi RN  Outcome: Ongoing  10/30/2021 1551 by Mame Andrea RN  Outcome: Ongoing     Problem: MOBILITY  Goal: Early mobilization is achieved  10/30/2021 2311 by Carrie Parisi RN  Outcome: Ongoing  10/30/2021 1551 by Mame Andrea RN  Outcome: Ongoing     Problem: ELIMINATION  Goal: Elimination patterns are normal or improving  Description: Elimination patterns return to pre-surgery normal patterns  10/30/2021 2311 by Carrie Parisi RN  Outcome: Ongoing  10/30/2021 1551 by Mame Andrea RN  Outcome: Ongoing     Problem: SKIN INTEGRITY  Goal: Skin integrity is maintained or improved  10/30/2021 2311 by Carrie Parisi RN  Outcome: Ongoing  10/30/2021 1551 by Mame Andrea RN  Outcome: Ongoing     Problem: Falls - Risk of:  Goal: Will remain free from falls  Description: Will remain free from falls  10/30/2021 2311 by Carrie Parisi RN  Outcome: Ongoing  10/30/2021 1551 by Mame Andrea RN  Outcome: Ongoing  Goal: Absence of physical injury  Description: Absence of physical injury  10/30/2021 2311 by Carrie Parisi RN  Outcome: Ongoing  10/30/2021 1551 by Mame Andrea RN  Outcome: Ongoing

## 2021-10-31 NOTE — PROGRESS NOTES
Arianne Levin is a 49 y.o. male patient.     Current Facility-Administered Medications   Medication Dose Route Frequency Provider Last Rate Last Admin    zolpidem (AMBIEN) tablet 5 mg  5 mg Oral Nightly PRN LUDA Narayanan - CNP   5 mg at 10/30/21 2007    lidocaine PF 1 % injection 5 mL  5 mL IntraDERmal Once Pradeep Vance MD        sodium chloride flush 0.9 % injection 5-40 mL  5-40 mL IntraVENous 2 times per day Pradeep Vance MD   10 mL at 10/31/21 0909    sodium chloride flush 0.9 % injection 5-40 mL  5-40 mL IntraVENous PRN Pradeep Vance MD   10 mL at 10/30/21 2037    0.9 % sodium chloride infusion  25 mL IntraVENous PRN Pradeep Vance MD        ipratropium-albuterol (DUONEB) nebulizer solution 1 ampule  1 ampule Inhalation Q4H TEMO Venegas PA-C   1 ampule at 10/30/21 1539    HYDROmorphone HCl PF (DILAUDID) injection 0.5 mg  0.5 mg IntraVENous Q2H PRN Mark Anthony Stisnon MD        Or    HYDROmorphone HCl PF (DILAUDID) injection 1 mg  1 mg IntraVENous Q2H PRN Mark Anthony Stinson MD   1 mg at 10/30/21 1634    sodium chloride flush 0.9 % injection 5-40 mL  5-40 mL IntraVENous 2 times per day Mark Anthony Stinson MD   10 mL at 10/31/21 0909    sodium chloride flush 0.9 % injection 5-40 mL  5-40 mL IntraVENous PRN Mark Anthony Stinson MD   10 mL at 10/30/21 2036    0.9 % sodium chloride infusion  25 mL IntraVENous PRN Mark Anthony Stinson MD        enoxaparin (LOVENOX) injection 40 mg  40 mg SubCUTAneous Daily Mark Anthony Stinson MD   40 mg at 10/31/21 0908    promethazine (PHENERGAN) tablet 12.5 mg  12.5 mg Oral Q6H PRN Mark Anthony Stinson MD        Or    ondansetron [facility]) injection 4 mg  4 mg IntraVENous Q6H PRN Mark Anthony Stinson MD        morphine (PF) injection 2 mg  2 mg IntraVENous Q2H PRN Mark Anthony Stinson MD        Or    morphine (PF) injection 4 mg  4 mg IntraVENous Q2H PRN Mark Anthony Stinson MD        oxyCODONE (ROXICODONE) immediate release tablet 5 mg  5 mg Oral Q4H PRN Mark Anthony Stinson MD        Or   [facility] oxyCODONE (ROXICODONE) immediate release tablet 10 mg  10 mg Oral Q4H PRN Irma Benito MD   10 mg at 10/31/21 0644    acetaminophen (TYLENOL) tablet 650 mg  650 mg Oral Q6H Irma Benito MD   650 mg at 10/31/21 0908    fluconazole (DIFLUCAN) in 0.9 % sodium chloride IVPB 200 mg  200 mg IntraVENous Q24H Irma Benito  mL/hr at 10/30/21 1638 200 mg at 10/30/21 1638    sodium chloride flush 0.9 % injection 5-40 mL  5-40 mL IntraVENous 2 times per day Irma Benito MD   10 mL at 10/30/21 1016    sodium chloride flush 0.9 % injection 5-40 mL  5-40 mL IntraVENous PRN Irma Benito MD        0.9 % sodium chloride infusion  25 mL IntraVENous PRN Irma Benito MD        ondansetron (ZOFRAN-ODT) disintegrating tablet 4 mg  4 mg Oral Q6H PRN Irma Benito MD        Or    [facility]ron [facility] 4 mg  4 mg IntraVENous Q6H PRN Irma Benito MD        polyethylene glycol [facility]) packet 17 g  17 g Oral Daily PRN Irma Benito MD        acetaminophen (TYLENOL) tablet 650 mg  650 mg Oral Q6H PRN Irma Benito MD   650 mg at 10/29/21 0030    Or    acetaminophen (TYLENOL) suppository 650 mg  650 mg Rectal Q6H PRN Irma Benito MD        LORazepam (ATIVAN) injection 0.5 mg  0.5 mg IntraVENous Q6H PRN Irma Benito MD   0.5 mg at 10/30/21 2323     No Known Allergies  Active Problems:    Complex care coordination    Intra-abdominal abscess [facility])    Acute diverticulitis    Colonic diverticular abscess    Perforated sigmoid colon ([facility] [address]    Failure of outpatient treatment    [facility]    Hepatic steatosis    History of open sigmoidectomy    Receiving intravenous antibiotic treatment as outpatient  Resolved Problems:    * No resolved hospital problems. *    Blood pressure 133/83, pulse 71, temperature 98.5 °F (36.9 °C), temperature source Oral, resp. rate 16, height 5' 10\" (1.778 m), weight 165 lb 2 oz (74.9 kg), SpO2 97 %. Subjective:  Symptoms:  Improved. Diet:  Adequate intake. Activity level: Normal.    Pain:  He complains of pain that is mild. Objective:  General Appearance:  Comfortable. Vital signs: (most recent): Blood pressure 133/83, pulse 71, temperature 98.5 °F (36.9 °C), temperature source Oral, resp. rate 16, height 5' 10\" (1.778 m), weight 165 lb 2 oz (74.9 kg), SpO2 97 %. Output: Producing urine and producing stool. Lungs:  Normal effort and normal respiratory rate. Abdomen: Abdomen is soft and non-distended. (Incision approximated without evidence of infection). Neurological: Patient is alert and oriented to person, place and time. Skin:  Warm and dry. Assessment & Plan 25-year-old male who is postop day #4 status post sigmoid colon resection with coloproctostomy and diverting loop ileostomy. Doing well. Tolerating diet. Having bowel function. Doing well with ileostomy care. Okay for discharge home. Percocet prescription left on chart. Follow-up this week with Dr. Viviana Givens to have colostomy bridge removed.     Nieves Franks MD  10/31/2021

## 2021-11-01 LAB
ANAEROBIC CULTURE: NORMAL
CULTURE SURGICAL: NORMAL
GRAM STAIN RESULT: NORMAL

## 2021-11-03 ENCOUNTER — OFFICE VISIT (OUTPATIENT)
Dept: SURGERY | Age: 51
End: 2021-11-03

## 2021-11-03 VITALS — WEIGHT: 154 LBS | BODY MASS INDEX: 22.1 KG/M2 | SYSTOLIC BLOOD PRESSURE: 111 MMHG | DIASTOLIC BLOOD PRESSURE: 88 MMHG

## 2021-11-03 DIAGNOSIS — Z48.89 ENCOUNTER FOR POSTOPERATIVE CARE: Primary | ICD-10-CM

## 2021-11-03 PROCEDURE — 99024 POSTOP FOLLOW-UP VISIT: CPT | Performed by: SURGERY

## 2021-11-03 RX ORDER — OXYCODONE HYDROCHLORIDE 5 MG/1
5 TABLET ORAL EVERY 4 HOURS PRN
Qty: 20 TABLET | Refills: 0 | Status: SHIPPED | OUTPATIENT
Start: 2021-11-03 | End: 2021-11-10

## 2021-11-03 NOTE — PROGRESS NOTES
[facility]  SUBJECTIVE:    Arianne Levin   1970   49 y.o. male presents for routine postoperative followup after   OR Date 10/27/21, Exploratory laparotomy, sigmoidectomy with low pelvic anastomosis, takedown of splenic flexure, and diverting loop ileostomy for severe acute diverticulitis with abscess     Incisional pain. Helps with oxycodone but almost out. Taking some Tylenol and no anti-inflammatories. Tolerated diet. Ostomy output normal.  Ambulating with some difficulty secondary to pain but improving. No past medical history on file. Past Surgical History:   Procedure Laterality Date    COLOSTOMY N/A 10/27/2021    EXPLORATORY LAPAROTOMY, SIGMOID RESECTION, ILEOSTOMY. performed by Mark Anthony Stinson MD at [address] History     Socioeconomic History    Marital status:      Spouse name: Not on file    Number of children: Not on file    Years of education: Not on file    Highest education level: Not on file   Occupational History    Occupation: [employer]   Tobacco Use    Smoking status: Current Every Day Smoker     Types: Cigarettes    Smokeless tobacco: Never Used   Vaping Use    Vaping Use: Never used   Substance and Sexual Activity    Alcohol use: Yes    Drug use: Never    Sexual activity: Not on file   Other Topics Concern    Not on file   Social History Narrative    Not on file     Social Determinants of Health     Financial Resource Strain:     Difficulty of Paying Living Expenses:    Food Insecurity:     Worried About Running Out of Food in the Last Year:     [address] in the Last Year:    Transportation Needs:     Lack of Transportation (Medical):      Lack of Transportation (Non-Medical):    Physical Activity:     Days of Exercise per Week:     Minutes of Exercise per Session:    Stress:     Feeling of Stress :    Social Connections:     Frequency of Communication with Friends and Family:     Frequency of Social Gatherings with Friends and Family:     Attends Sikhism Services:     Active Member of Clubs or Organizations:     Attends Club or Organization Meetings:     Marital Status:    Intimate Partner Violence:     Fear of Current or Ex-Partner:     Emotionally Abused:     Physically Abused:     Sexually Abused:       Family History   Problem Relation Age of Onset    No Known Problems Mother     No Known Problems Father      Current Outpatient Medications   Medication Sig Dispense Refill    oxyCODONE-acetaminophen (PERCOCET) 5-325 MG per tablet Take 1-2 tablets by mouth every 4 hours as needed for Pain for up to 3 days. Intended supply: 3 days. Take lowest dose possible to manage pain 20 tablet 0    sildenafil (REVATIO) 20 MG tablet Take only 1 to 2 pills every 3 days as needed 20 tablet 1    ondansetron (ZOFRAN ODT) 4 MG disintegrating tablet Take 1 tablet by mouth every 8 hours as needed for Nausea 20 tablet 0     No current facility-administered medications for this visit. No Known Allergies     Review of Systems:  Review of systems performed and negative with the exception of the above findings    OBJECTIVE:  /88   Wt 154 lb (69.9 kg)   BMI 22.10 kg/m²      Physical Exam:  General appearance: alert, appears stated age, cooperative and no distress  Abdomen: soft, non-distended, appropriate incisional tenderness, incision clean dry and intact, KARLA serosanguineous with minimal output and removed without incident    No results displayed because visit has over 200 results.       Admission on 10/18/2021, Discharged on 10/18/2021   Component Date Value Ref Range Status    WBC 10/18/2021 9.1  4.0 - 11.0 K/uL Final    RBC 10/18/2021 5.25  4.20 - 5.90 M/uL Final    Hemoglobin 10/18/2021 17.5  13.5 - 17.5 g/dL Final    Hematocrit 10/18/2021 49.4  40.5 - 52.5 % Final    MCV 10/18/2021 94.0  80.0 - 100.0 fL Final    MCH 10/18/2021 33.3  26.0 - 34.0 pg Final    MCHC 10/18/2021 35.4  31.0 - 36.0 g/dL Final    RDW 10/18/2021 13.1  12.4 - 15.4 % Final    Platelets 91/94/1117 135  135 - 450 K/uL Final    MPV 10/18/2021 8.7  5.0 - 10.5 fL Final    Neutrophils % 10/18/2021 84.3  % Final    Lymphocytes % 10/18/2021 5.1  % Final    Monocytes % 10/18/2021 9.5  % Final    Eosinophils % 10/18/2021 0.7  % Final    Basophils % 10/18/2021 0.4  % Final    Neutrophils Absolute 10/18/2021 7.6  1.7 - 7.7 K/uL Final    Lymphocytes Absolute 10/18/2021 0.5* 1.0 - 5.1 K/uL Final    Monocytes Absolute 10/18/2021 0.9  0.0 - 1.3 K/uL Final    Eosinophils Absolute 10/18/2021 0.1  0.0 - 0.6 K/uL Final    Basophils Absolute 10/18/2021 0.0  0.0 - 0.2 K/uL Final    Sodium 10/18/2021 129* 136 - 145 mmol/L Final    Potassium 10/18/2021 3.7  3.5 - 5.1 mmol/L Final    Chloride 10/18/2021 96* 99 - 110 mmol/L Final    CO2 10/18/2021 20* 21 - 32 mmol/L Final    Anion Gap 10/18/2021 13  3 - 16 Final    Glucose 10/18/2021 121* 70 - 99 mg/dL Final    BUN 10/18/2021 6* 7 - 20 mg/dL Final    CREATININE 10/18/2021 0.7* 0.9 - 1.3 mg/dL Final    GFR Non- 10/18/2021 >60  >60 Final    Comment: >60 mL/min/1.73m2 EGFR, calc. for ages 25 and older using the  MDRD formula (not corrected for weight), is valid for stable  renal function.  GFR  10/18/2021 >60  >60 Final    Comment: Chronic Kidney Disease: less than 60 ml/min/1.73 sq.m. Kidney Failure: less than 15 ml/min/1.73 sq.m. Results valid for patients 18 years and older.       Calcium 10/18/2021 9.3  8.3 - 10.6 mg/dL Final    Total Protein 10/18/2021 8.1  6.4 - 8.2 g/dL Final    Albumin 10/18/2021 3.9  3.4 - 5.0 g/dL Final    Albumin/Globulin Ratio 10/18/2021 0.9* 1.1 - 2.2 Final    Total Bilirubin 10/18/2021 0.4  0.0 - 1.0 mg/dL Final    Alkaline Phosphatase 10/18/2021 129  40 - 129 U/L Final    ALT 10/18/2021 387* 10 - 40 U/L Final    AST 10/18/2021 320* 15 - 37 U/L Final    Globulin 10/18/2021 4. 2  Not Established g/dL Final    Lipase 10/18/2021 43.0  13.0 - 60.0 U/L Final    Color, UA 10/18/2021 YELLOW  Straw/Yellow Final    Clarity, UA 10/18/2021 Clear  Clear Final    Glucose, Ur 10/18/2021 Negative  Negative mg/dL Final    Bilirubin Urine 10/18/2021 Negative  Negative Final    Ketones, Urine 10/18/2021 15* Negative mg/dL Final    Specific [place], UA 10/18/2021 1.013  1.005 - 1.030 Final    Blood, Urine 10/18/2021 SMALL* Negative Final    pH, UA 10/18/2021 5.5  5.0 - 8.0 Final    Protein, UA 10/18/2021 100* Negative mg/dL Final    Urobilinogen, Urine 10/18/2021 1.0  <2.0 E.U./dL Final    Nitrite, Urine 10/18/2021 Negative  Negative Final    Leukocyte Esterase, Urine 10/18/2021 Negative  Negative Final    Microscopic Examination 10/18/2021 YES   Final    Urine Type 10/18/2021 NotGiven   Final    Urine received in a container without preservatives.  Urine Reflex to Culture 10/18/2021 Not Indicated   Final    Hyaline Casts, UA 10/18/2021 0  0 - 8 /LPF Final    WBC, UA 10/18/2021 2  0 - 5 /HPF Final    RBC, UA 10/18/2021 4  0 - 4 /HPF Final    Epithelial Cells, UA 10/18/2021 1  0 - 5 /HPF Final    Comment: Urinalysis microscopic performed using the  automated methodology (AUWI analyzer).  SARS-CoV-2, PCR 10/18/2021 Not Detected  Not Detected Final    Comment: This test has been authorized by FDA under an  Emergency Use Authorization (EUA). This test is only authorized for the duration of the  time of declaration that circumstances exist justifying the  authorization of the emergency use of in vitro diagnostic  testing for detection of the SARS-CoV-2 virus  and/or diagnosis of COVID-19 infection under  section 564 (b)(1) of the Act, 21 U. S.C. 210AZA-4 (b) (1),  unless the authorization is terminated or revoked sooner.     Patient Fact https://aviles.com/  Provider Fact https://[employer]com/    METHODOLOGY: RT PCR     Admission on 10/18/2021, Discharged on 10/18/2021   Component Date Value Ref Range Status    WBC 10/18/2021 9.4  4.0 - 11.0 K/uL Final    RBC 10/18/2021 5.37  4.20 - 5.90 M/uL Final    Hemoglobin 10/18/2021 17.9* 13.5 - 17.5 g/dL Final    Hematocrit 10/18/2021 50.7  40.5 - 52.5 % Final    MCV 10/18/2021 94.4  80.0 - 100.0 fL Final    MCH 10/18/2021 33.3  26.0 - 34.0 pg Final    MCHC 10/18/2021 35.3  31.0 - 36.0 g/dL Final    RDW 10/18/2021 13.4  12.4 - 15.4 % Final    Platelets 65/93/2816 134* 135 - 450 K/uL Final    MPV 10/18/2021 8.4  5.0 - 10.5 fL Final    Neutrophils % 10/18/2021 85.9  % Final    Lymphocytes % 10/18/2021 5.1  % Final    Monocytes % 10/18/2021 7.4  % Final    Eosinophils % 10/18/2021 0.6  % Final    Basophils % 10/18/2021 1.0  % Final    Neutrophils Absolute 10/18/2021 8.0* 1.7 - 7.7 K/uL Final    Lymphocytes Absolute 10/18/2021 0.5* 1.0 - 5.1 K/uL Final    Monocytes Absolute 10/18/2021 0.7  0.0 - 1.3 K/uL Final    Eosinophils Absolute 10/18/2021 0.1  0.0 - 0.6 K/uL Final    Basophils Absolute 10/18/2021 0.1  0.0 - 0.2 K/uL Final    Sodium 10/18/2021 128* 136 - 145 mmol/L Final    Potassium reflex Magnesium 10/18/2021 4.0  3.5 - 5.1 mmol/L Final    Chloride 10/18/2021 95* 99 - 110 mmol/L Final    CO2 10/18/2021 24  21 - 32 mmol/L Final    Anion Gap 10/18/2021 9  3 - 16 Final    Glucose 10/18/2021 118* 70 - 99 mg/dL Final    BUN 10/18/2021 7  7 - 20 mg/dL Final    CREATININE 10/18/2021 0.9  0.9 - 1.3 mg/dL Final    GFR Non- 10/18/2021 >60  >60 Final    Comment: >60 mL/min/1.73m2 EGFR, calc. for ages 25 and older using the  MDRD formula (not corrected for weight), is valid for stable  renal function.  GFR  10/18/2021 >60  >60 Final    Comment: Chronic Kidney Disease: less than 60 ml/min/1.73 sq.m. Kidney Failure: less than 15 ml/min/1.73 sq.m. Results valid for patients 18 years and older.       Calcium 10/18/2021 9.5  8.3 - 10.6 mg/dL Final    Total Protein 10/18/2021 8.1  6.4 - 8.2 g/dL Final    Albumin 10/18/2021 3.9  3.4 - 5.0 g/dL Final    Albumin/Globulin Ratio 10/18/2021 0.9* 1.1 - 2.2 Final    Total Bilirubin 10/18/2021 0.5  0.0 - 1.0 mg/dL Final    Alkaline Phosphatase 10/18/2021 108  40 - 129 U/L Final    ALT 10/18/2021 357* 10 - 40 U/L Final    AST 10/18/2021 315* 15 - 37 U/L Final    Globulin 10/18/2021 4.2  Not Established g/dL Final    Lipase 10/18/2021 40.0  13.0 - 60.0 U/L Final       CT ABDOMEN PELVIS W IV CONTRAST Additional Contrast? Oral    Result Date: 10/27/2021  EXAMINATION: CT OF THE ABDOMEN AND PELVIS WITH CONTRAST 10/27/2021 11:33 am TECHNIQUE: CT of the abdomen and pelvis was performed with the administration of oral and intravenous contrast. Multiplanar reformatted images are provided for review. Dose modulation, iterative reconstruction, and/or weight based adjustment of the mA/kV was utilized to reduce the radiation dose to as low as reasonably achievable. COMPARISON: CT abdomen and pelvis 10/25/2021 and 10/18/2021 HISTORY: ORDERING SYSTEM PROVIDED HISTORY: followup sigmoid diverticulitis with abscess Acuity: Unknown Type of Exam: Unknown FINDINGS: Lower Chest: Visualized portions of the lungs are unremarkable. Cardiac and posterior mediastinal structures visualized are unremarkable. Organs: Again seen is a relatively hyperattenuating cystic type lesion (61 Hounsfield units (upper pole left kidney, 1.4 x 1.5 cm. Kidneys appear otherwise unremarkable. The liver is fatty with focal area of increased fatty infiltration segment Stanley. No suspicious hepatic lesion or intrahepatic bile duct dilatation evident. Craniocaudal liver length 19 cm. Unremarkable appearance of the spleen, adrenal glands, pancreas and gallbladder.  GI/Bowel: Persistent inflammatory changes including areas of wall thickening and pericolonic inflammation centered at the sigmoid colon, with progressing inflammation and fluid collections extending superiorly from the lower left pelvis to the mid and upper pelvis on the left and centrally. Other portions of the colon appear unremarkable, oral contrast reaching the proximal descending colon. The small bowel and stomach appear unremarkable. Normal appearing contrast filled appendix is seen right lower quadrant. Pelvis: Persistent inflammatory changes including areas of wall thickening and pericolonic inflammation centered at the sigmoid colon, with progressing inflammation and fluid collections extending superiorly from the lower left pelvis to the mid and upper pelvis on the left and centrally. Loculated abscess retroperitoneum of the pelvis axial series 2 image 115, sagittal image 93, measures 2.1 x 6.2 x 2.3 cm with small gas bubbles contained within. This is can fluid with an additional abscess located along the superior portion of the sigmoid colon axial series 2, image 130 measuring 2 x 6.1 cm. This appears can fluid with a 3rd portion of the abscess intimately associated with the anti mesenteric margin of the sigmoid colon (left side of the sigmoid colon (axial series 2, image 141 measuring 3.1 x 3.3 cm, previously 2.3 x 2.5 cm. Peritoneum/Retroperitoneum: Unremarkable appearance of the aorta. No aneurysm. Unremarkable appearance of the IVC. No adenopathy or fluid. No pneumoperitoneum evident. Bones/Soft Tissues: No acute superficial soft tissue or osseous structure abnormality evident. 1. Acute sigmoid diverticulitis with reactive colitis and abscess formation in the pelvis as described above, significantly increased since prior study. 2. Redemonstration of indeterminate lesion upper pole left kidney. Follow-up CT abdomen attention kidneys without and with IV contrast recommended in 3-6 months for additional characterization, versus MRI abdomen attention kidneys without and with gadolinium. 3. Hepatic steatosis.  The results were called by Dr. Roya Zayas to GREG KING on 10/27/2021 at 12:36. CT ABDOMEN PELVIS W IV CONTRAST Additional Contrast? None    Result Date: 10/25/2021  EXAMINATION: CT OF THE ABDOMEN AND PELVIS WITH CONTRAST 10/25/2021 9:01 am TECHNIQUE: CT of the abdomen and pelvis was performed with the administration of intravenous contrast. Multiplanar reformatted images are provided for review. Dose modulation, iterative reconstruction, and/or weight based adjustment of the mA/kV was utilized to reduce the radiation dose to as low as reasonably achievable. COMPARISON: 10/18/2021 HISTORY: ORDERING SYSTEM PROVIDED HISTORY: abd pain h/o diverticulitis TECHNOLOGIST PROVIDED HISTORY: Reason for exam:->abd pain h/o diverticulitis Additional Contrast?->None Decision Support Exception - unselect if not a suspected or confirmed emergency medical condition->Emergency Medical Condition (MA) Reason for Exam: abd pain h/o diverticulitis Acuity: Unknown Type of Exam: Unknown FINDINGS: Lower Chest: Lung bases clear Organs: 1.5 cm hyperdense left renal lesion. Low-attenuation of the liver. Remaining solid organs and gallbladder unremarkable GI/Bowel: Colonic diverticula. Persistent inflammatory changes adjacent to the sigmoid colon. New extraluminal bubbles of gas within the perisigmoid inflammatory stranding. 2.5 cm rounded fluid collection adjacent to the lateral margin of the sigmoid without a well-defined wall. No intestinal distension Pelvis: Trace free pelvic fluid. Urinary bladder unremarkable Peritoneum/Retroperitoneum: No ascites or pneumoperitoneum. Normal caliber aorta Bones/Soft Tissues: No bony abnormality     1. Persistent changes of sigmoid diverticulitis, now with findings of locally contained perforation and suspected developing 2.5 cm diverticular abscess 2. 1.5 cm hyperdense left renal lesion. Recommend dedicated renal MRI to assess for mass versus proteinaceous cyst 3.  Hepatic steatosis     CT ABDOMEN PELVIS W IV CONTRAST Additional Contrast? None    Result Date: 10/18/2021  EXAMINATION: CT OF THE ABDOMEN AND PELVIS WITH CONTRAST 10/18/2021 6:02 pm TECHNIQUE: CT of the abdomen and pelvis was performed with the administration of intravenous contrast. Multiplanar reformatted images are provided for review. Dose modulation, iterative reconstruction, and/or weight based adjustment of the mA/kV was utilized to reduce the radiation dose to as low as reasonably achievable. COMPARISON: None. HISTORY: ORDERING SYSTEM PROVIDED HISTORY: RLQ pain TECHNOLOGIST PROVIDED HISTORY: Reason for exam:->RLQ pain Additional Contrast?->None Decision Support Exception - unselect if not a suspected or confirmed emergency medical condition->Emergency Medical Condition (MA) Reason for Exam: RLQ PAIN Acuity: Acute Type of Exam: Initial FINDINGS: Lower Chest: Lung bases clear. Organs: Marked diffuse hepatic steatosis. Unremarkable spleen, pancreas, adrenals, and right kidney. A 1.6 cm cyst in the posterolateral cortex of the left kidney. No definite cholelithiasis. GI/Bowel: Normal appendix. Multiple fluid-filled small bowel and colonic loops, nonspecific finding which can be seen with acute enterocolitis. Distal colonic diverticulosis. Circumferential wall thickening of the mid sigmoid colon with surrounding fat stranding, compatible with acute diverticulitis. Renita Noah Pelvis: Incompletely distended urinary bladder. Normal sized prostate. Bilateral small to moderate-sized hydroceles, partially included. Peritoneum/Retroperitoneum: No free air or free fluid. No abdominal or pelvic abscess. No adenopathy. Minimal vascular calcification. No abdominal aortic aneurysm. Bones/Soft Tissues: Small bone island in the superior endplate of L5. No acute osseous abnormality. Acute sigmoid diverticulitis. No free air or peridiverticular abscess. Multiple fluid-filled small bowel and colonic loops, nonspecific finding which can be seen with acute enterocolitis. Normal appendix. Small to moderate-sized bilateral hydroceles partially included. XR CHEST PORTABLE    Result Date: 10/28/2021  EXAMINATION: ONE XRAY VIEW OF THE CHEST 10/28/2021 11:33 am COMPARISON:  HISTORY: ORDERING SYSTEM PROVIDED HISTORY: cough, wheezing TECHNOLOGIST PROVIDED HISTORY: Reason for exam:->cough, wheezing Acuity: Unknown FINDINGS: Cardiomediastinal silhouette: No cardiomegaly or obvious acute process is identified. Lungs/pleura: Mild under aeration at the right base with medial density suggestive of atelectasis and bronchovascular crowding by portable chest x-ray evaluation. Other: No additional acute abnormality. Mild mainly right basilar hypoventilation with density suggestive of atelectasis RECOMMENDATION: Clinical correlation and radiographic follow-up to ensure clearing or CT. Pathology:  FINAL DIAGNOSIS:     Colon, sigmoid, resection:   - Segment of colon with diverticulosis and diverticulitis; marked   karlie-colonic acute inflammation compatible with abscess. - Resection margins viable. - Anastomotic rings. PILLO/PILLO       Assessment:  OR Date 10/27/21, Exploratory laparotomy, sigmoidectomy with low pelvic anastomosis, takedown of splenic flexure, and diverting loop ileostomy for severe acute diverticulitis with abscess       Plan:  1. Recommend scheduled Tylenol and ibuprofen with food around-the-clock for the next 7 to 10 days. We will give a small refill of oxycodone and counseled to use for breakthrough pain only. Prolonged use decreases efficacy and increases risk of dependence  2. Local wound care to former drain site. Return to office in 2 weeks for staple removal  3. No heavy lifting over 20lbs for 6 weeks total postop  4. Diet and activity as tolerated otherwise  5. We will remove ostomy bar in approximately 6 weeks, at approximately 3 months we will test colorectal anastomosis the patient is doing well with a contrast enema.   If normal may be candidate for

## 2021-11-03 NOTE — PATIENT INSTRUCTIONS
1.  Recommend scheduled Tylenol and ibuprofen with food around-the-clock for the next 7 to 10 days. We will give a small refill of oxycodone and counseled to use for breakthrough pain only. Prolonged use decreases efficacy and increases risk of dependence  2. Local wound care to former drain site. Return to office in 2 weeks for staple removal  3. No heavy lifting over 20lbs for 6 weeks total postop  4. Diet and activity as tolerated otherwise  5. We will remove ostomy bar in approximately 6 weeks, at approximately 3 months we will test colorectal anastomosis the patient is doing well with a contrast enema.   If normal may be candidate for ostomy reversal

## 2021-11-08 ENCOUNTER — HOSPITAL ENCOUNTER (OUTPATIENT)
Age: 51
Setting detail: SPECIMEN
Discharge: HOME | End: 2021-11-08
Payer: COMMERCIAL

## 2021-11-08 LAB
A/G RATIO: 1.1 (ref 1.1–2.2)
ALBUMIN SERPL-MCNC: 3.8 G/DL (ref 3.4–5)
ALP BLD-CCNC: 137 U/L (ref 40–129)
ALT SERPL-CCNC: 41 U/L (ref 10–40)
ANION GAP SERPL CALCULATED.3IONS-SCNC: 12 MMOL/L (ref 3–16)
AST SERPL-CCNC: 25 U/L (ref 15–37)
BASOPHILS ABSOLUTE: 0.1 K/UL (ref 0–0.2)
BASOPHILS RELATIVE PERCENT: 1.1 %
BILIRUB SERPL-MCNC: <0.2 MG/DL (ref 0–1)
BUN BLDV-MCNC: 12 MG/DL (ref 7–20)
CALCIUM SERPL-MCNC: 9.7 MG/DL (ref 8.3–10.6)
CHLORIDE BLD-SCNC: 98 MMOL/L (ref 99–110)
CO2: 25 MMOL/L (ref 21–32)
CREAT SERPL-MCNC: 0.7 MG/DL (ref 0.9–1.3)
EOSINOPHILS ABSOLUTE: 0.3 K/UL (ref 0–0.6)
EOSINOPHILS RELATIVE PERCENT: 4.2 %
GFR AFRICAN AMERICAN: >60
GFR NON-AFRICAN AMERICAN: >60
GLUCOSE BLD-MCNC: 109 MG/DL (ref 70–99)
HCT VFR BLD CALC: 41.8 % (ref 40.5–52.5)
HEMOGLOBIN: 13.7 G/DL (ref 13.5–17.5)
LYMPHOCYTES ABSOLUTE: 2.5 K/UL (ref 1–5.1)
LYMPHOCYTES RELATIVE PERCENT: 34.8 %
MCH RBC QN AUTO: 32.1 PG (ref 26–34)
MCHC RBC AUTO-ENTMCNC: 32.8 G/DL (ref 31–36)
MCV RBC AUTO: 97.9 FL (ref 80–100)
MONOCYTES ABSOLUTE: 0.7 K/UL (ref 0–1.3)
MONOCYTES RELATIVE PERCENT: 8.9 %
NEUTROPHILS ABSOLUTE: 3.7 K/UL (ref 1.7–7.7)
NEUTROPHILS RELATIVE PERCENT: 51 %
PDW BLD-RTO: 13.6 % (ref 12.4–15.4)
PLATELET # BLD: 431 K/UL (ref 135–450)
PMV BLD AUTO: 8.7 FL (ref 5–10.5)
POTASSIUM SERPL-SCNC: 4.4 MMOL/L (ref 3.5–5.1)
RBC # BLD: 4.27 M/UL (ref 4.2–5.9)
SODIUM BLD-SCNC: 135 MMOL/L (ref 136–145)
TOTAL PROTEIN: 7.3 G/DL (ref 6.4–8.2)
WBC # BLD: 7.3 K/UL (ref 4–11)

## 2021-11-08 PROCEDURE — 85025 COMPLETE CBC W/AUTO DIFF WBC: CPT

## 2021-11-08 PROCEDURE — 36415 COLL VENOUS BLD VENIPUNCTURE: CPT

## 2021-11-08 PROCEDURE — 80053 COMPREHEN METABOLIC PANEL: CPT

## 2021-11-10 ENCOUNTER — OFFICE VISIT (OUTPATIENT)
Dept: SURGERY | Age: 51
End: 2021-11-10

## 2021-11-10 VITALS — SYSTOLIC BLOOD PRESSURE: 116 MMHG | WEIGHT: 153 LBS | BODY MASS INDEX: 21.95 KG/M2 | DIASTOLIC BLOOD PRESSURE: 75 MMHG

## 2021-11-10 DIAGNOSIS — Z48.89 ENCOUNTER FOR POSTOPERATIVE CARE: Primary | ICD-10-CM

## 2021-11-10 PROCEDURE — 99024 POSTOP FOLLOW-UP VISIT: CPT | Performed by: SURGERY

## 2021-11-10 NOTE — PROGRESS NOTES
[facility]  SUBJECTIVE:    Kain Agarwal   1970   49 y.o. male presents for routine postoperative followup after   OR Date 10/27/21, Exploratory laparotomy, sigmoidectomy with low pelvic anastomosis, takedown of splenic flexure, and diverting loop ileostomy for severe acute diverticulitis with abscess      Incisional pain minimal.  Tolerating diet. Ostomy output normal.  No major complaints and presents primarily for staple removal    No past medical history on file. Past Surgical History:   Procedure Laterality Date    COLOSTOMY N/A 10/27/2021    EXPLORATORY LAPAROTOMY, SIGMOID RESECTION, ILEOSTOMY. performed by Rema Epps MD at [address] History     Socioeconomic History    Marital status:      Spouse name: Not on file    Number of children: Not on file    Years of education: Not on file    Highest education level: Not on file   Occupational History    Occupation: [employer]   Tobacco Use    Smoking status: Current Every Day Smoker     Types: Cigarettes    Smokeless tobacco: Never Used   Vaping Use    Vaping Use: Never used   Substance and Sexual Activity    Alcohol use: Yes    Drug use: Never    Sexual activity: Not on file   Other Topics Concern    Not on file   Social History Narrative    Not on file     Social Determinants of Health     Financial Resource Strain:     Difficulty of Paying Living Expenses: Not on file   Food Insecurity:     Worried About Running Out of Food in the Last Year: Not on file    Sherice of Food in the Last Year: Not on file   Transportation Needs:     Lack of Transportation (Medical): Not on file    Lack of Transportation (Non-Medical):  Not on file   Physical Activity:     Days of Exercise per Week: Not on file    Minutes of Exercise per Session: Not on file   Stress:     Feeling of Stress : Not on file   Social Connections:     Frequency of Communication with Friends and Family: Not on file    Frequency of Social Gatherings with Friends and Family: Not on file    Attends Oriental orthodox Services: Not on file    Active Member of Clubs or Organizations: Not on file    Attends Club or Organization Meetings: Not on file    Marital Status: Not on file   Intimate Partner Violence:     Fear of Current or Ex-Partner: Not on file    Emotionally Abused: Not on file    Physically Abused: Not on file    Sexually Abused: Not on file   Housing Stability:     Unable to Pay for Housing in the Last Year: Not on file    Number of [place] in the Last Year: Not on file    Unstable Housing in the Last Year: Not on file      Family History   Problem Relation Age of Onset    No Known Problems Mother     No Known Problems Father      Current Outpatient Medications   Medication Sig Dispense Refill    oxyCODONE (ROXICODONE) 5 MG immediate release tablet Take 1 tablet by mouth every 4 hours as needed for Pain for up to 20 doses. 20 tablet 0    sildenafil (REVATIO) 20 MG tablet Take only 1 to 2 pills every 3 days as needed 20 tablet 1    ondansetron (ZOFRAN ODT) 4 MG disintegrating tablet Take 1 tablet by mouth every 8 hours as needed for Nausea 20 tablet 0     No current facility-administered medications for this visit.       No Known Allergies     Review of Systems:  Review of systems performed and negative with the exception of the above findings    OBJECTIVE:  /75   Wt 153 lb (69.4 kg)   BMI 21.95 kg/m²      Physical Exam:  General appearance: alert, appears stated age, cooperative and no distress  Abdomen: soft, non-distended, appropriate incisional tenderness, incision clean dry and intact, staples removed and replaced with Steri-Strips, low aspect of incision with small amount of drainage it was probed with a Q-tip and moderate amount of clearish yellow fluid possibly consistent with fat necrosis, ostomy pink and viable with normal enteric output    Hospital Outpatient Visit on 11/08/2021   Component Date Value Ref Range Status    Sodium 11/08/2021 135* 136 - 145 mmol/L Final    Potassium 11/08/2021 4.4  3.5 - 5.1 mmol/L Final    Chloride 11/08/2021 98* 99 - 110 mmol/L Final    CO2 11/08/2021 25  21 - 32 mmol/L Final    Anion Gap 11/08/2021 12  3 - 16 Final    Glucose 11/08/2021 109* 70 - 99 mg/dL Final    BUN 11/08/2021 12  7 - 20 mg/dL Final    CREATININE 11/08/2021 0.7* 0.9 - 1.3 mg/dL Final    GFR Non- 11/08/2021 >60  >60 Final    Comment: >60 mL/min/1.73m2 EGFR, calc. for ages 25 and older using the  MDRD formula (not corrected for weight), is valid for stable  renal function.  GFR  11/08/2021 >60  >60 Final    Comment: Chronic Kidney Disease: less than 60 ml/min/1.73 sq.m. Kidney Failure: less than 15 ml/min/1.73 sq.m. Results valid for patients 18 years and older.       Calcium 11/08/2021 9.7  8.3 - 10.6 mg/dL Final    Total Protein 11/08/2021 7.3  6.4 - 8.2 g/dL Final    Albumin 11/08/2021 3.8  3.4 - 5.0 g/dL Final    Albumin/Globulin Ratio 11/08/2021 1.1  1.1 - 2.2 Final    Total Bilirubin 11/08/2021 <0.2  0.0 - 1.0 mg/dL Final    Alkaline Phosphatase 11/08/2021 137* 40 - 129 U/L Final    ALT 11/08/2021 41* 10 - 40 U/L Final    AST 11/08/2021 25  15 - 37 U/L Final    WBC 11/08/2021 7.3  4.0 - 11.0 K/uL Final    RBC 11/08/2021 4.27  4.20 - 5.90 M/uL Final    Hemoglobin 11/08/2021 13.7  13.5 - 17.5 g/dL Final    Hematocrit 11/08/2021 41.8  40.5 - 52.5 % Final    MCV 11/08/2021 97.9  80.0 - 100.0 fL Final    MCH 11/08/2021 32.1  26.0 - 34.0 pg Final    MCHC 11/08/2021 32.8  31.0 - 36.0 g/dL Final    RDW 11/08/2021 13.6  12.4 - 15.4 % Final    Platelets 00/77/4489 431  135 - 450 K/uL Final    MPV 11/08/2021 8.7  5.0 - 10.5 fL Final    Neutrophils % 11/08/2021 51.0  % Final    Lymphocytes % 11/08/2021 34.8  % Final    Monocytes % 11/08/2021 8.9  % Final    Eosinophils % 11/08/2021 4.2  % Final    Basophils % 11/08/2021 1.1  % Final    Neutrophils Absolute 11/08/2021 3.7  1.7 - 7.7 K/uL Final    Lymphocytes Absolute 11/08/2021 2.5  1.0 - 5.1 K/uL Final    Monocytes Absolute 11/08/2021 0.7  0.0 - 1.3 K/uL Final    Eosinophils Absolute 11/08/2021 0.3  0.0 - 0.6 K/uL Final    Basophils Absolute 11/08/2021 0.1  0.0 - 0.2 K/uL Final   No results displayed because visit has over 200 results.       Admission on 10/18/2021, Discharged on 10/18/2021   Component Date Value Ref Range Status    WBC 10/18/2021 9.1  4.0 - 11.0 K/uL Final    RBC 10/18/2021 5.25  4.20 - 5.90 M/uL Final    Hemoglobin 10/18/2021 17.5  13.5 - 17.5 g/dL Final    Hematocrit 10/18/2021 49.4  40.5 - 52.5 % Final    MCV 10/18/2021 94.0  80.0 - 100.0 fL Final    MCH 10/18/2021 33.3  26.0 - 34.0 pg Final    MCHC 10/18/2021 35.4  31.0 - 36.0 g/dL Final    RDW 10/18/2021 13.1  12.4 - 15.4 % Final    Platelets 12/08/1906 135  135 - 450 K/uL Final    MPV 10/18/2021 8.7  5.0 - 10.5 fL Final    Neutrophils % 10/18/2021 84.3  % Final    Lymphocytes % 10/18/2021 5.1  % Final    Monocytes % 10/18/2021 9.5  % Final    Eosinophils % 10/18/2021 0.7  % Final    Basophils % 10/18/2021 0.4  % Final    Neutrophils Absolute 10/18/2021 7.6  1.7 - 7.7 K/uL Final    Lymphocytes Absolute 10/18/2021 0.5* 1.0 - 5.1 K/uL Final    Monocytes Absolute 10/18/2021 0.9  0.0 - 1.3 K/uL Final    Eosinophils Absolute 10/18/2021 0.1  0.0 - 0.6 K/uL Final    Basophils Absolute 10/18/2021 0.0  0.0 - 0.2 K/uL Final    Sodium 10/18/2021 129* 136 - 145 mmol/L Final    Potassium 10/18/2021 3.7  3.5 - 5.1 mmol/L Final    Chloride 10/18/2021 96* 99 - 110 mmol/L Final    CO2 10/18/2021 20* 21 - 32 mmol/L Final    Anion Gap 10/18/2021 13  3 - 16 Final    Glucose 10/18/2021 121* 70 - 99 mg/dL Final    BUN 10/18/2021 6* 7 - 20 mg/dL Final    CREATININE 10/18/2021 0.7* 0.9 - 1.3 mg/dL Final    GFR Non- 10/18/2021 >60  >60 Final    Comment: >60 Comment: Urinalysis microscopic performed using the  automated methodology (AUWI analyzer).  SARS-CoV-2, PCR 10/18/2021 Not Detected  Not Detected Final    Comment: This test has been authorized by FDA under an  Emergency Use Authorization (EUA). This test is only authorized for the duration of the  time of declaration that circumstances exist justifying the  authorization of the emergency use of in vitro diagnostic  testing for detection of the SARS-CoV-2 virus  and/or diagnosis of COVID-19 infection under  section 564 (b)(1) of the Act, 21 U. S.C. 882RDS-4 (b) (1),  unless the authorization is terminated or revoked sooner.     Patient Fact [email or web address]  Provider Fact [email or web address]    METHODOLOGY: RT PCR     Admission on 10/18/2021, Discharged on 10/18/2021   Component Date Value Ref Range Status    WBC 10/18/2021 9.4  4.0 - 11.0 K/uL Final    RBC 10/18/2021 5.37  4.20 - 5.90 M/uL Final    Hemoglobin 10/18/2021 17.9* 13.5 - 17.5 g/dL Final    Hematocrit 10/18/2021 50.7  40.5 - 52.5 % Final    MCV 10/18/2021 94.4  80.0 - 100.0 fL Final    MCH 10/18/2021 33.3  26.0 - 34.0 pg Final    MCHC 10/18/2021 35.3  31.0 - 36.0 g/dL Final    RDW 10/18/2021 13.4  12.4 - 15.4 % Final    Platelets 57/41/1037 134* 135 - 450 K/uL Final    MPV 10/18/2021 8.4  5.0 - 10.5 fL Final    Neutrophils % 10/18/2021 85.9  % Final    Lymphocytes % 10/18/2021 5.1  % Final    Monocytes % 10/18/2021 7.4  % Final    Eosinophils % 10/18/2021 0.6  % Final    Basophils % 10/18/2021 1.0  % Final    Neutrophils Absolute 10/18/2021 8.0* 1.7 - 7.7 K/uL Final    Lymphocytes Absolute 10/18/2021 0.5* 1.0 - 5.1 K/uL Final    Monocytes Absolute 10/18/2021 0.7  0.0 - 1.3 K/uL Final    Eosinophils Absolute 10/18/2021 0.1  0.0 - 0.6 K/uL Final    Basophils Absolute 10/18/2021 0.1  0.0 - 0.2 K/uL Final    Sodium 10/18/2021 128* 136 - 145 mmol/L Final    Potassium reflex Magnesium 10/18/2021 4.0  3.5 - 5.1 mmol/L Final    Chloride 10/18/2021 95* 99 - 110 mmol/L Final    CO2 10/18/2021 24  21 - 32 mmol/L Final    Anion Gap 10/18/2021 9  3 - 16 Final    Glucose 10/18/2021 118* 70 - 99 mg/dL Final    BUN 10/18/2021 7  7 - 20 mg/dL Final    CREATININE 10/18/2021 0.9  0.9 - 1.3 mg/dL Final    GFR Non- 10/18/2021 >60  >60 Final    Comment: >60 mL/min/1.73m2 EGFR, calc. for ages 25 and older using the  MDRD formula (not corrected for weight), is valid for stable  renal function.  GFR  10/18/2021 >60  >60 Final    Comment: Chronic Kidney Disease: less than 60 ml/min/1.73 sq.m. Kidney Failure: less than 15 ml/min/1.73 sq.m. Results valid for patients 18 years and older.  Calcium 10/18/2021 9.5  8.3 - 10.6 mg/dL Final    Total Protein 10/18/2021 8.1  6.4 - 8.2 g/dL Final    Albumin 10/18/2021 3.9  3.4 - 5.0 g/dL Final    Albumin/Globulin Ratio 10/18/2021 0.9* 1.1 - 2.2 Final    Total Bilirubin 10/18/2021 0.5  0.0 - 1.0 mg/dL Final    Alkaline Phosphatase 10/18/2021 108  40 - 129 U/L Final    ALT 10/18/2021 357* 10 - 40 U/L Final    AST 10/18/2021 315* 15 - 37 U/L Final    Globulin 10/18/2021 4.2  Not Established g/dL Final    Lipase 10/18/2021 40.0  13.0 - 60.0 U/L Final       CT ABDOMEN PELVIS W IV CONTRAST Additional Contrast? Oral    Result Date: 10/27/2021  EXAMINATION: CT OF THE ABDOMEN AND PELVIS WITH CONTRAST 10/27/2021 11:33 am TECHNIQUE: CT of the abdomen and pelvis was performed with the administration of oral and intravenous contrast. Multiplanar reformatted images are provided for review. Dose modulation, iterative reconstruction, and/or weight based adjustment of the mA/kV was utilized to reduce the radiation dose to as low as reasonably achievable.  COMPARISON: CT abdomen and pelvis 10/25/2021 and 10/18/2021 HISTORY: ORDERING SYSTEM PROVIDED HISTORY: followup sigmoid diverticulitis with abscess Acuity: Unknown Type of Exam: Unknown FINDINGS: Lower Chest: Visualized portions of the lungs are unremarkable. Cardiac and posterior mediastinal structures visualized are unremarkable. Organs: Again seen is a relatively hyperattenuating cystic type lesion (61 Hounsfield units (upper pole left kidney, 1.4 x 1.5 cm. Kidneys appear otherwise unremarkable. The liver is fatty with focal area of increased fatty infiltration segment Stanley. No suspicious hepatic lesion or intrahepatic bile duct dilatation evident. Craniocaudal liver length 19 cm. Unremarkable appearance of the spleen, adrenal glands, pancreas and gallbladder. GI/Bowel: Persistent inflammatory changes including areas of wall thickening and pericolonic inflammation centered at the sigmoid colon, with progressing inflammation and fluid collections extending superiorly from the lower left pelvis to the mid and upper pelvis on the left and centrally. Other portions of the colon appear unremarkable, oral contrast reaching the proximal descending colon. The small bowel and stomach appear unremarkable. Normal appearing contrast filled appendix is seen right lower quadrant. Pelvis: Persistent inflammatory changes including areas of wall thickening and pericolonic inflammation centered at the sigmoid colon, with progressing inflammation and fluid collections extending superiorly from the lower left pelvis to the mid and upper pelvis on the left and centrally. Loculated abscess retroperitoneum of the pelvis axial series 2 image 115, sagittal image 93, measures 2.1 x 6.2 x 2.3 cm with small gas bubbles contained within. This is can fluid with an additional abscess located along the superior portion of the sigmoid colon axial series 2, image 130 measuring 2 x 6.1 cm.   This appears can fluid with a 3rd portion of the abscess intimately associated with the anti mesenteric margin of the sigmoid colon (left side of the sigmoid colon (axial series 2, image 141 measuring 3.1 x 3.3 cm, previously 2.3 x 2.5 cm. Peritoneum/Retroperitoneum: Unremarkable appearance of the aorta. No aneurysm. Unremarkable appearance of the IVC. No adenopathy or fluid. No pneumoperitoneum evident. Bones/Soft Tissues: No acute superficial soft tissue or osseous structure abnormality evident. 1. Acute sigmoid diverticulitis with reactive colitis and abscess formation in the pelvis as described above, significantly increased since prior study. 2. Redemonstration of indeterminate lesion upper pole left kidney. Follow-up CT abdomen attention kidneys without and with IV contrast recommended in 3-6 months for additional characterization, versus MRI abdomen attention kidneys without and with gadolinium. 3. Hepatic steatosis. The results were called by Dr. Kathy Mills to [facility] on 10/27/2021 at 12:36. CT ABDOMEN PELVIS W IV CONTRAST Additional Contrast? None    Result Date: 10/25/2021  EXAMINATION: CT OF THE ABDOMEN AND PELVIS WITH CONTRAST 10/25/2021 9:01 am TECHNIQUE: CT of the abdomen and pelvis was performed with the administration of intravenous contrast. Multiplanar reformatted images are provided for review. Dose modulation, iterative reconstruction, and/or weight based adjustment of the mA/kV was utilized to reduce the radiation dose to as low as reasonably achievable. COMPARISON: 10/18/2021 HISTORY: ORDERING SYSTEM PROVIDED HISTORY: abd pain h/o diverticulitis TECHNOLOGIST PROVIDED HISTORY: Reason for exam:->abd pain h/o diverticulitis Additional Contrast?->None Decision Support Exception - unselect if not a suspected or confirmed emergency medical condition->Emergency Medical Condition (MA) Reason for Exam: abd pain h/o diverticulitis Acuity: Unknown Type of Exam: Unknown FINDINGS: Lower Chest: Lung bases clear Organs: 1.5 cm hyperdense left renal lesion. Low-attenuation of the liver. Remaining solid organs and gallbladder unremarkable GI/Bowel: Colonic diverticula.   Persistent inflammatory changes adjacent to the sigmoid colon. New extraluminal bubbles of gas within the perisigmoid inflammatory stranding. 2.5 cm rounded fluid collection adjacent to the lateral margin of the sigmoid without a well-defined wall. No intestinal distension Pelvis: Trace free pelvic fluid. Urinary bladder unremarkable Peritoneum/Retroperitoneum: No ascites or pneumoperitoneum. Normal caliber aorta Bones/Soft Tissues: No bony abnormality     1. Persistent changes of sigmoid diverticulitis, now with findings of locally contained perforation and suspected developing 2.5 cm diverticular abscess 2. 1.5 cm hyperdense left renal lesion. Recommend dedicated renal MRI to assess for mass versus proteinaceous cyst 3. Hepatic steatosis     CT ABDOMEN PELVIS W IV CONTRAST Additional Contrast? None    Result Date: 10/18/2021  EXAMINATION: CT OF THE ABDOMEN AND PELVIS WITH CONTRAST 10/18/2021 6:02 pm TECHNIQUE: CT of the abdomen and pelvis was performed with the administration of intravenous contrast. Multiplanar reformatted images are provided for review. Dose modulation, iterative reconstruction, and/or weight based adjustment of the mA/kV was utilized to reduce the radiation dose to as low as reasonably achievable. COMPARISON: None. HISTORY: ORDERING SYSTEM PROVIDED HISTORY: RLQ pain TECHNOLOGIST PROVIDED HISTORY: Reason for exam:->RLQ pain Additional Contrast?->None Decision Support Exception - unselect if not a suspected or confirmed emergency medical condition->Emergency Medical Condition (MA) Reason for Exam: RLQ PAIN Acuity: Acute Type of Exam: Initial FINDINGS: Lower Chest: Lung bases clear. Organs: Marked diffuse hepatic steatosis. Unremarkable spleen, pancreas, adrenals, and right kidney. A 1.6 cm cyst in the posterolateral cortex of the left kidney. No definite cholelithiasis. GI/Bowel: Normal appendix.   Multiple fluid-filled small bowel and colonic loops, nonspecific finding which can be seen with acute enterocolitis. Distal colonic diverticulosis. Circumferential wall thickening of the mid sigmoid colon with surrounding fat stranding, compatible with acute diverticulitis. Shari Raddle Pelvis: Incompletely distended urinary bladder. Normal sized prostate. Bilateral small to moderate-sized hydroceles, partially included. Peritoneum/Retroperitoneum: No free air or free fluid. No abdominal or pelvic abscess. No adenopathy. Minimal vascular calcification. No abdominal aortic aneurysm. Bones/Soft Tissues: Small bone island in the superior endplate of L5. No acute osseous abnormality. Acute sigmoid diverticulitis. No free air or peridiverticular abscess. Multiple fluid-filled small bowel and colonic loops, nonspecific finding which can be seen with acute enterocolitis. Normal appendix. Small to moderate-sized bilateral hydroceles partially included. XR CHEST PORTABLE    Result Date: 10/28/2021  EXAMINATION: ONE XRAY VIEW OF THE CHEST 10/28/2021 11:33 am COMPARISON:  HISTORY: ORDERING SYSTEM PROVIDED HISTORY: cough, wheezing TECHNOLOGIST PROVIDED HISTORY: Reason for exam:->cough, wheezing Acuity: Unknown FINDINGS: Cardiomediastinal silhouette: No cardiomegaly or obvious acute process is identified. Lungs/pleura: Mild under aeration at the right base with medial density suggestive of atelectasis and bronchovascular crowding by portable chest x-ray evaluation. Other: No additional acute abnormality. Mild mainly right basilar hypoventilation with density suggestive of atelectasis RECOMMENDATION: Clinical correlation and radiographic follow-up to ensure clearing or CT. Assessment:  OR Date 10/27/21, Exploratory laparotomy, sigmoidectomy with low pelvic anastomosis, takedown of splenic flexure, and diverting loop ileostomy for severe acute diverticulitis with abscess      Plan:  1. Tylenol and ibuprofen for pain control   2. Local wound care with dry dressing as needed, may shower normally  3.  No heavy lifting over 20lbs for 6 weeks total postop  4. Diet and activity as tolerated otherwise  5. We will remove ostomy bar in approximately 6 weeks, at approximately 3 months we will test colorectal anastomosis the patient is doing well with a contrast enema. If normal may be candidate for ostomy reversal    Kirill Wren MD, FACS  11/10/2021  2:06 PM

## 2021-11-10 NOTE — PATIENT INSTRUCTIONS
1. Tylenol and ibuprofen for pain control   2. Local wound care with dry dressing as needed, may shower normally  3. No heavy lifting over 20lbs for 6 weeks total postop  4. Diet and activity as tolerated otherwise  5. We will remove ostomy bar in approximately 6 weeks, at approximately 3 months we will test colorectal anastomosis the patient is doing well with a contrast enema.   If normal may be candidate for ostomy reversal

## 2021-11-15 ENCOUNTER — OFFICE VISIT (OUTPATIENT)
Dept: INTERNAL MEDICINE CLINIC | Age: 51
End: 2021-11-15
Payer: COMMERCIAL

## 2021-11-15 ENCOUNTER — TELEPHONE (OUTPATIENT)
Dept: INFECTIOUS DISEASES | Age: 51
End: 2021-11-15

## 2021-11-15 VITALS
BODY MASS INDEX: 22.13 KG/M2 | OXYGEN SATURATION: 99 % | WEIGHT: 154.6 LBS | SYSTOLIC BLOOD PRESSURE: 120 MMHG | DIASTOLIC BLOOD PRESSURE: 80 MMHG | HEART RATE: 70 BPM | HEIGHT: 70 IN

## 2021-11-15 DIAGNOSIS — Z98.890 HISTORY OF OPEN SIGMOIDECTOMY: ICD-10-CM

## 2021-11-15 DIAGNOSIS — Z12.5 SPECIAL SCREENING, PROSTATE CANCER: ICD-10-CM

## 2021-11-15 DIAGNOSIS — K65.1 INTRA-ABDOMINAL ABSCESS (HCC): ICD-10-CM

## 2021-11-15 DIAGNOSIS — K63.1 PERFORATED SIGMOID COLON (HCC): ICD-10-CM

## 2021-11-15 DIAGNOSIS — Z13.220 SCREENING FOR CHOLESTEROL LEVEL: ICD-10-CM

## 2021-11-15 DIAGNOSIS — K76.0 HEPATIC STEATOSIS: ICD-10-CM

## 2021-11-15 DIAGNOSIS — N28.9 RENAL LESION: Primary | ICD-10-CM

## 2021-11-15 DIAGNOSIS — N28.9 RENAL LESION: ICD-10-CM

## 2021-11-15 DIAGNOSIS — F17.210 CIGARETTE NICOTINE DEPENDENCE WITHOUT COMPLICATION: ICD-10-CM

## 2021-11-15 DIAGNOSIS — Z79.2 RECEIVING INTRAVENOUS ANTIBIOTIC TREATMENT AS OUTPATIENT: ICD-10-CM

## 2021-11-15 DIAGNOSIS — Z90.49 HISTORY OF OPEN SIGMOIDECTOMY: ICD-10-CM

## 2021-11-15 PROBLEM — R53.83 OTHER FATIGUE: Status: RESOLVED | Noted: 2021-01-04 | Resolved: 2021-11-15

## 2021-11-15 LAB
A/G RATIO: 1.5 (ref 1.1–2.2)
ALBUMIN SERPL-MCNC: 4.3 G/DL (ref 3.4–5)
ALP BLD-CCNC: 104 U/L (ref 40–129)
ALT SERPL-CCNC: 38 U/L (ref 10–40)
ANION GAP SERPL CALCULATED.3IONS-SCNC: 13 MMOL/L (ref 3–16)
AST SERPL-CCNC: 21 U/L (ref 15–37)
BASOPHILS ABSOLUTE: 0.1 K/UL (ref 0–0.2)
BASOPHILS RELATIVE PERCENT: 1.2 %
BILIRUB SERPL-MCNC: 0.3 MG/DL (ref 0–1)
BUN BLDV-MCNC: 9 MG/DL (ref 7–20)
CALCIUM SERPL-MCNC: 10 MG/DL (ref 8.3–10.6)
CHLORIDE BLD-SCNC: 103 MMOL/L (ref 99–110)
CHOLESTEROL, TOTAL: 152 MG/DL (ref 0–199)
CO2: 25 MMOL/L (ref 21–32)
CREAT SERPL-MCNC: 0.7 MG/DL (ref 0.9–1.3)
EOSINOPHILS ABSOLUTE: 0.5 K/UL (ref 0–0.6)
EOSINOPHILS RELATIVE PERCENT: 6 %
GFR AFRICAN AMERICAN: >60
GFR NON-AFRICAN AMERICAN: >60
GLUCOSE BLD-MCNC: 83 MG/DL (ref 70–99)
HCT VFR BLD CALC: 44.6 % (ref 40.5–52.5)
HDLC SERPL-MCNC: 43 MG/DL (ref 40–60)
HEMOGLOBIN: 14.8 G/DL (ref 13.5–17.5)
LDL CHOLESTEROL CALCULATED: 79 MG/DL
LYMPHOCYTES ABSOLUTE: 2.3 K/UL (ref 1–5.1)
LYMPHOCYTES RELATIVE PERCENT: 25.6 %
MCH RBC QN AUTO: 31.8 PG (ref 26–34)
MCHC RBC AUTO-ENTMCNC: 33.2 G/DL (ref 31–36)
MCV RBC AUTO: 95.9 FL (ref 80–100)
MONOCYTES ABSOLUTE: 0.7 K/UL (ref 0–1.3)
MONOCYTES RELATIVE PERCENT: 7.7 %
NEUTROPHILS ABSOLUTE: 5.4 K/UL (ref 1.7–7.7)
NEUTROPHILS RELATIVE PERCENT: 59.5 %
PDW BLD-RTO: 13.5 % (ref 12.4–15.4)
PLATELET # BLD: 344 K/UL (ref 135–450)
PMV BLD AUTO: 9.3 FL (ref 5–10.5)
POTASSIUM SERPL-SCNC: 5.2 MMOL/L (ref 3.5–5.1)
PROSTATE SPECIFIC ANTIGEN: 0.34 NG/ML (ref 0–4)
RBC # BLD: 4.65 M/UL (ref 4.2–5.9)
SODIUM BLD-SCNC: 141 MMOL/L (ref 136–145)
TOTAL PROTEIN: 7.2 G/DL (ref 6.4–8.2)
TRIGL SERPL-MCNC: 148 MG/DL (ref 0–150)
VLDLC SERPL CALC-MCNC: 30 MG/DL
WBC # BLD: 9.1 K/UL (ref 4–11)

## 2021-11-15 PROCEDURE — 99204 OFFICE O/P NEW MOD 45 MIN: CPT | Performed by: INTERNAL MEDICINE

## 2021-11-15 ASSESSMENT — ENCOUNTER SYMPTOMS
SINUS PAIN: 0
WHEEZING: 0
COLOR CHANGE: 0
CHEST TIGHTNESS: 0
ABDOMINAL PAIN: 0
CONSTIPATION: 0
BLOOD IN STOOL: 0
SHORTNESS OF BREATH: 0
COUGH: 0

## 2021-11-15 NOTE — TELEPHONE ENCOUNTER
If the patient is doing well and has no abdominal pain or fever, okay to stop antibiotics and remove PICC line.

## 2021-11-15 NOTE — TELEPHONE ENCOUNTER
SAUMYA [facility] nurse called and stated that patient is due to end today. Can she pull the PICC.   Thank you

## 2021-11-15 NOTE — TELEPHONE ENCOUNTER
Spoke with [facility] nurse and she advised no fevers and patient not having any abdominal pain. States he has some tenderness around his ostomy site. Nurse said he is doing well. I advised of MD note.   She verbalized understanding

## 2021-11-15 NOTE — ASSESSMENT & PLAN NOTE
Patient will be referred to urology had three CAT scans with three different readings varying from having a cyst versus a mass will defer to the urologist to decide if any further intervention is needed including needle biopsy versus removal of the lesion

## 2021-11-15 NOTE — ASSESSMENT & PLAN NOTE
Patient used to drink more heavily in the past he is not drinking as much we will repeat his liver function test and reassess the need for any intervention

## 2021-11-15 NOTE — PROGRESS NOTES
Lysbeth Angelucci (:  1970) is a 49 y.o. male,New patient, here for evaluation of the following chief complaint(s):  Established New Doctor         ASSESSMENT/PLAN:  1. Renal lesion  Assessment & Plan:  Patient will be referred to urology had three CAT scans with three different readings varying from having a cyst versus a mass will defer to the urologist to decide if any further intervention is needed including needle biopsy versus removal of the lesion  Orders:  -     Harvey Quezada MD, Urology, [facility]  -     CBC Auto Differential; Future  -     Comprehensive Metabolic Panel; Future  2. Cigarette nicotine dependence without complication  Assessment & Plan:  I counseled patient smoking cessation and he is can consider stopping smoking  3. Screening for cholesterol level  -     Lipid Panel; Future  4. Special screening, prostate cancer  -     PSA, Prostatic Specific Antigen; Future  5. Hepatic steatosis  Assessment & Plan:  Patient used to drink more heavily in the past he is not drinking as much we will repeat his liver function test and reassess the need for any intervention  Orders:  -     Comprehensive Metabolic Panel; Future  6. Intra-abdominal abscess (HCC)  -     Ronan Messer MD, Gastroenterology, [facility]  7. History of open sigmoidectomy  -     [facility]  8. Perforated sigmoid colon ([address]  -     Ronan Messer MD, Gastroenterology, [facility]  9. Receiving intravenous antibiotic treatment as outpatient  Assessment & Plan:  -His antibiotics today and with infectious disease to decide on the next steps  Orders:  -     [facility]      Return in about 4 weeks (around 2021).          Subjective   SUBJECTIVE/OBJECTIVE:    Lab Review   Lab Results   Component Value Date     2021     10/31/2021     10/30/2021    K 4.4 2021 K 4.3 10/31/2021    K 3.7 10/30/2021    K 4.0 10/18/2021    CO2 25 11/08/2021    CO2 26 10/31/2021    CO2 26 10/30/2021    BUN 12 11/08/2021    BUN 4 10/31/2021    BUN 4 10/30/2021    CREATININE 0.7 11/08/2021    CREATININE 0.5 10/31/2021    CREATININE 0.6 10/30/2021    GLUCOSE 109 11/08/2021    GLUCOSE 100 10/31/2021    GLUCOSE 114 10/30/2021    CALCIUM 9.7 11/08/2021    CALCIUM 8.8 10/31/2021    CALCIUM 9.1 10/30/2021     Lab Results   Component Value Date    WBC 7.3 11/08/2021    WBC 9.9 10/31/2021    WBC 10.6 10/30/2021    HGB 13.7 11/08/2021    HGB 11.6 10/31/2021    HGB 12.7 10/30/2021    HCT 41.8 11/08/2021    HCT 34.1 10/31/2021    HCT 38.9 10/30/2021    MCV 97.9 11/08/2021    MCV 94.5 10/31/2021    MCV 97.0 10/30/2021     11/08/2021     10/31/2021     10/30/2021     No results found for: CHOL, TRIG, HDL, LDLDIRECT    Vitals 11/15/2021 11/10/2021 43/2/1094   SYSTOLIC 623 035 636   DIASTOLIC 80 75 88   Pulse 70 - -   Temp - - -   Resp - - -   SpO2 99 - -   Weight 154 lb 9.6 oz 153 lb 154 lb   Height 5' 10\" - -   Body mass index 22.18 kg/m2 - -   Some recent data might be hidden       Patient is here to establish himself and to follow-up on his events from his hospitalization    The patient has been diagnosed lately with acute diverticulitis that got complicated with perforation and intra-abdominal abscess and required to have laparotomy as well as colostomy and is currently on an IV antibiotics which she is finishing off    The patient is otherwise doing fairly well patient used to be very heavy smoker he is cut back on his smoking significantly at this stage      Review of Systems   Constitutional: Positive for fatigue. Negative for activity change, appetite change, chills, diaphoresis, fever and unexpected weight change. HENT: Negative for congestion, ear pain and sinus pain. Respiratory: Negative for cough, chest tightness, shortness of breath and wheezing.     Cardiovascular: Negative for chest pain and palpitations. Gastrointestinal: Negative for abdominal pain, blood in stool and constipation. Endocrine: Negative for cold intolerance, heat intolerance and polyuria. Genitourinary: Negative for dysuria, frequency and urgency. Musculoskeletal: Negative for arthralgias and myalgias. Skin: Negative for color change and rash. Neurological: Negative for weakness and headaches. Hematological: Negative for adenopathy. Does not bruise/bleed easily. Psychiatric/Behavioral: Negative for agitation, dysphoric mood and sleep disturbance. Objective   Physical Exam  Constitutional:       General: He is not in acute distress. Appearance: Normal appearance. HENT:      Head: Normocephalic and atraumatic. Right Ear: Tympanic membrane normal.      Left Ear: Tympanic membrane normal.      Nose: Nose normal.   Eyes:      Extraocular Movements: Extraocular movements intact. Conjunctiva/sclera: Conjunctivae normal.      Pupils: Pupils are equal, round, and reactive to light. Neck:      Vascular: No carotid bruit. Cardiovascular:      Rate and Rhythm: Normal rate and regular rhythm. Pulses: Normal pulses. Heart sounds: No murmur heard. Pulmonary:      Effort: Pulmonary effort is normal. No respiratory distress. Breath sounds: Normal breath sounds. Abdominal:      General: Abdomen is flat. Bowel sounds are normal. There is no distension. Palpations: Abdomen is soft. Tenderness: There is no abdominal tenderness. Musculoskeletal:         General: No swelling, tenderness or deformity. Cervical back: Normal range of motion and neck supple. No rigidity or tenderness. Right lower leg: No edema. Left lower leg: No edema. Lymphadenopathy:      Cervical: No cervical adenopathy. Skin:     Coloration: Skin is not jaundiced. Findings: No bruising, erythema or lesion.    Neurological:      General: No focal deficit present. Mental Status: He is alert and oriented to person, place, and time. Cranial Nerves: No cranial nerve deficit. Motor: No weakness. Gait: Gait normal.            This dictation was generated by voice recognition computer software. Although all attempts are made to edit the dictation for accuracy, there may be errors in the transcription that are not intended. An electronic signature was used to authenticate this note.     --Lucero Grace MD

## 2021-11-17 NOTE — TELEPHONE ENCOUNTER
[facility]   [phone] Sania Razo for nurse to call back to confirm she pulled patients picc line.

## 2021-11-29 LAB
FUNGUS (MYCOLOGY) CULTURE: NORMAL
FUNGUS STAIN: NORMAL

## 2021-12-10 ENCOUNTER — OFFICE VISIT (OUTPATIENT)
Dept: SURGERY | Age: 51
End: 2021-12-10

## 2021-12-10 VITALS — DIASTOLIC BLOOD PRESSURE: 82 MMHG | WEIGHT: 158 LBS | SYSTOLIC BLOOD PRESSURE: 138 MMHG | BODY MASS INDEX: 22.67 KG/M2

## 2021-12-10 DIAGNOSIS — Z48.89 ENCOUNTER FOR POSTOPERATIVE CARE: Primary | ICD-10-CM

## 2021-12-10 PROCEDURE — 99024 POSTOP FOLLOW-UP VISIT: CPT | Performed by: SURGERY

## 2021-12-10 NOTE — PROGRESS NOTES
[facility]  SUBJECTIVE:    Kym Mooring   1970   49 y.o. male presents for routine postoperative followup after   OR Date 10/27/21, Exploratory laparotomy, sigmoidectomy with low pelvic anastomosis, takedown of splenic flexure, and diverting loop ileostomy for severe acute diverticulitis with abscess      Incisional pain is minimal.  Tolerating diet. Ostomy output normal.  No major complaints. Presents primarily to discuss ostomy bar removal    Past Medical History:   Diagnosis Date    Renal lesion 11/15/2021     Past Surgical History:   Procedure Laterality Date    COLOSTOMY N/A 10/27/2021    EXPLORATORY LAPAROTOMY, SIGMOID RESECTION, ILEOSTOMY. performed by Herber Garcia MD at [address] History     Socioeconomic History    Marital status:      Spouse name: Not on file    Number of children: Not on file    Years of education: Not on file    Highest education level: Not on file   Occupational History    Occupation: [employer]   Tobacco Use    Smoking status: Current Every Day Smoker     Types: Cigarettes    Smokeless tobacco: Never Used   Vaping Use    Vaping Use: Never used   Substance and Sexual Activity    Alcohol use: Yes    Drug use: Never    Sexual activity: Not on file   Other Topics Concern    Not on file   Social History Narrative    Not on file     Social Determinants of Health     Financial Resource Strain:     Difficulty of Paying Living Expenses: Not on file   Food Insecurity:     Worried About Running Out of Food in the Last Year: Not on file    Sherice of Food in the Last Year: Not on file   Transportation Needs:     Lack of Transportation (Medical): Not on file    Lack of Transportation (Non-Medical):  Not on file   Physical Activity:     Days of Exercise per Week: Not on file    Minutes of Exercise per Session: Not on file   Stress:     Feeling of Stress : Not on file   Social Connections:     Frequency of Communication with Friends and Family: Not on file    Frequency of Social Gatherings with Friends and Family: Not on file    Attends Hinduism Services: Not on file    Active Member of Clubs or Organizations: Not on file    Attends Club or Organization Meetings: Not on file    Marital Status: Not on file   Intimate Partner Violence:     Fear of Current or Ex-Partner: Not on file    Emotionally Abused: Not on file    Physically Abused: Not on file    Sexually Abused: Not on file   Housing Stability:     Unable to Pay for Housing in the Last Year: Not on file    Number of [place] in the Last Year: Not on file    Unstable Housing in the Last Year: Not on file      Family History   Problem Relation Age of Onset    No Known Problems Mother     No Known Problems Father      No current outpatient medications on file. No current facility-administered medications for this visit.       No Known Allergies     Review of Systems:  Review of systems performed and negative with the exception of the above findings    OBJECTIVE:  /82   Wt 158 lb (71.7 kg)   BMI 22.67 kg/m²      Physical Exam:  General appearance: alert, appears stated age, cooperative and no distress  Abdomen: Soft, nondistended, nontender, incision well-healed, ostomy pink and viable with normal enteric output, ostomy bar in place    Orders Only on 11/15/2021   Component Date Value Ref Range Status    PSA 11/15/2021 0.34  0.00 - 4.00 ng/mL Final    Sodium 11/15/2021 141  136 - 145 mmol/L Final    Potassium 11/15/2021 5.2* 3.5 - 5.1 mmol/L Final    Chloride 11/15/2021 103  99 - 110 mmol/L Final    CO2 11/15/2021 25  21 - 32 mmol/L Final    Anion Gap 11/15/2021 13  3 - 16 Final    Glucose 11/15/2021 83  70 - 99 mg/dL Final    BUN 11/15/2021 9  7 - 20 mg/dL Final    CREATININE 11/15/2021 0.7* 0.9 - 1.3 mg/dL Final    GFR Non- 11/15/2021 >60  >60 Final    Comment: >60 mL/min/1.73m2 <100 mg/dL Final    VLDL Cholesterol Calculated 11/15/2021 30  Not Established mg/dL Final   Hospital Outpatient Visit on 11/08/2021   Component Date Value Ref Range Status    Sodium 11/08/2021 135* 136 - 145 mmol/L Final    Potassium 11/08/2021 4.4  3.5 - 5.1 mmol/L Final    Chloride 11/08/2021 98* 99 - 110 mmol/L Final    CO2 11/08/2021 25  21 - 32 mmol/L Final    Anion Gap 11/08/2021 12  3 - 16 Final    Glucose 11/08/2021 109* 70 - 99 mg/dL Final    BUN 11/08/2021 12  7 - 20 mg/dL Final    CREATININE 11/08/2021 0.7* 0.9 - 1.3 mg/dL Final    GFR Non- 11/08/2021 >60  >60 Final    Comment: >60 mL/min/1.73m2 EGFR, calc. for ages 25 and older using the  MDRD formula (not corrected for weight), is valid for stable  renal function.  GFR  11/08/2021 >60  >60 Final    Comment: Chronic Kidney Disease: less than 60 ml/min/1.73 sq.m. Kidney Failure: less than 15 ml/min/1.73 sq.m. Results valid for patients 18 years and older.       Calcium 11/08/2021 9.7  8.3 - 10.6 mg/dL Final    Total Protein 11/08/2021 7.3  6.4 - 8.2 g/dL Final    Albumin 11/08/2021 3.8  3.4 - 5.0 g/dL Final    Albumin/Globulin Ratio 11/08/2021 1.1  1.1 - 2.2 Final    Total Bilirubin 11/08/2021 <0.2  0.0 - 1.0 mg/dL Final    Alkaline Phosphatase 11/08/2021 137* 40 - 129 U/L Final    ALT 11/08/2021 41* 10 - 40 U/L Final    AST 11/08/2021 25  15 - 37 U/L Final    WBC 11/08/2021 7.3  4.0 - 11.0 K/uL Final    RBC 11/08/2021 4.27  4.20 - 5.90 M/uL Final    Hemoglobin 11/08/2021 13.7  13.5 - 17.5 g/dL Final    Hematocrit 11/08/2021 41.8  40.5 - 52.5 % Final    MCV 11/08/2021 97.9  80.0 - 100.0 fL Final    MCH 11/08/2021 32.1  26.0 - 34.0 pg Final    MCHC 11/08/2021 32.8  31.0 - 36.0 g/dL Final    RDW 11/08/2021 13.6  12.4 - 15.4 % Final    Platelets 27/[birth year]1960 431  135 - 450 K/uL Final    MPV 11/08/2021 8.7  5.0 - 10.5 fL Final    Neutrophils % 11/08/2021 51.0  % Final    Lymphocytes % 11/08/2021 34.8  % Final    Monocytes % 11/08/2021 8.9  % Final    Eosinophils % 11/08/2021 4.2  % Final    Basophils % 11/08/2021 1.1  % Final    Neutrophils Absolute 11/08/2021 3.7  1.7 - 7.7 K/uL Final    Lymphocytes Absolute 11/08/2021 2.5  1.0 - 5.1 K/uL Final    Monocytes Absolute 11/08/2021 0.7  0.0 - 1.3 K/uL Final    Eosinophils Absolute 11/08/2021 0.3  0.0 - 0.6 K/uL Final    Basophils Absolute 11/08/2021 0.1  0.0 - 0.2 K/uL Final   No results displayed because visit has over 200 results. Assessment:  OR Date 10/27/21, Exploratory laparotomy, sigmoidectomy with low pelvic anastomosis, takedown of splenic flexure, and diverting loop ileostomy for severe acute diverticulitis with abscess      Plan:  1. Counseled how to remove ostomy bar at home with scissors   2. May shower normally   3. Diet and activity as tolerated   4. At approximately 3 months we will test colorectal anastomosis the patient is doing well with a contrast enema.  If normal may be candidate for ostomy reversal    Kirill Le MD, FACS  12/10/2021  11:36 AM

## 2021-12-10 NOTE — PATIENT INSTRUCTIONS
1.  Counseled how to remove ostomy bar at home with scissors   2. May shower normally   3. Diet and activity as tolerated   4.  At approximately 3 months we will test colorectal anastomosis the patient is doing well with a contrast enema.  If normal may be candidate for ostomy reversal

## 2021-12-13 ENCOUNTER — TELEPHONE (OUTPATIENT)
Dept: INTERNAL MEDICINE CLINIC | Age: 51
End: 2021-12-13

## 2021-12-14 LAB
AFB CULTURE (MYCOBACTERIA): NORMAL
AFB SMEAR: NORMAL

## 2022-01-18 ENCOUNTER — TELEPHONE (OUTPATIENT)
Dept: SURGERY | Age: 52
End: 2022-01-18

## 2022-01-18 NOTE — TELEPHONE ENCOUNTER
Pt's wife called requesting colorectal anastomosis test to see if he can have a reversal for his ostomy.   Please Advise

## 2022-02-02 DIAGNOSIS — K63.1 PERFORATED SIGMOID COLON (HCC): Primary | ICD-10-CM

## 2022-02-02 DIAGNOSIS — Z90.49 HISTORY OF OPEN SIGMOIDECTOMY: ICD-10-CM

## 2022-02-02 DIAGNOSIS — Z98.890 HISTORY OF OPEN SIGMOIDECTOMY: ICD-10-CM

## 2022-02-09 ENCOUNTER — HOSPITAL ENCOUNTER (OUTPATIENT)
Dept: GENERAL RADIOLOGY | Age: 52
Discharge: HOME | End: 2022-02-09
Payer: COMMERCIAL

## 2022-02-09 DIAGNOSIS — Z90.49 HISTORY OF OPEN SIGMOIDECTOMY: ICD-10-CM

## 2022-02-09 DIAGNOSIS — K63.1 PERFORATED SIGMOID COLON (HCC): ICD-10-CM

## 2022-02-09 DIAGNOSIS — Z98.890 HISTORY OF OPEN SIGMOIDECTOMY: ICD-10-CM

## 2022-02-09 PROCEDURE — 74270 X-RAY XM COLON 1CNTRST STD: CPT

## 2022-02-16 ENCOUNTER — OFFICE VISIT (OUTPATIENT)
Dept: SURGERY | Age: 52
End: 2022-02-16
Payer: COMMERCIAL

## 2022-02-16 VITALS — BODY MASS INDEX: 23.1 KG/M2 | SYSTOLIC BLOOD PRESSURE: 120 MMHG | DIASTOLIC BLOOD PRESSURE: 82 MMHG | WEIGHT: 161 LBS

## 2022-02-16 DIAGNOSIS — Z93.9 HISTORY OF CREATION OF OSTOMY (HCC): Primary | ICD-10-CM

## 2022-02-16 PROCEDURE — 99213 OFFICE O/P EST LOW 20 MIN: CPT | Performed by: SURGERY

## 2022-02-16 RX ORDER — SODIUM CHLORIDE 0.9 % (FLUSH) 0.9 %
5-40 SYRINGE (ML) INJECTION PRN
Status: CANCELLED | OUTPATIENT
Start: 2022-02-16

## 2022-02-16 RX ORDER — SODIUM CHLORIDE 9 MG/ML
25 INJECTION, SOLUTION INTRAVENOUS PRN
Status: CANCELLED | OUTPATIENT
Start: 2022-02-16

## 2022-02-16 RX ORDER — SODIUM CHLORIDE 0.9 % (FLUSH) 0.9 %
5-40 SYRINGE (ML) INJECTION EVERY 12 HOURS SCHEDULED
Status: CANCELLED | OUTPATIENT
Start: 2022-02-16

## 2022-02-16 NOTE — PROGRESS NOTES
[facility]  SUBJECTIVE:    Chief Complaint: history of ostomy    Katya Oneal   1970   49 y.o. male presents to discuss reversal of his loop ileostomy. He is known to me after exploratory laparotomy sigmoidectomy with diverting loop ileostomy for severe acute diverticulitis with abscess on October 27, 2021. The patient has recovered and is doing well. Since last visit the patient has had a contrast enema that shows a patent anastomosis without complication. Denies fevers chills nausea vomiting change in ostomy output or other complaints    Past Medical History:   Diagnosis Date    Renal lesion 11/15/2021     Past Surgical History:   Procedure Laterality Date    COLOSTOMY N/A 10/27/2021    EXPLORATORY LAPAROTOMY, SIGMOID RESECTION, ILEOSTOMY. performed by Eliza Sheppard MD at [address] [place] [address] History     Socioeconomic History    Marital status:      Spouse name: Not on file    Number of children: Not on file    Years of education: Not on file    Highest education level: Not on file   Occupational History    Occupation: [employer]   Tobacco Use    Smoking status: Current Every Day Smoker     Types: Cigarettes    Smokeless tobacco: Never Used   Vaping Use    Vaping Use: Never used   Substance and Sexual Activity    Alcohol use: Yes    Drug use: Never    Sexual activity: Not on file   Other Topics Concern    Not on file   Social History Narrative    Not on file     Social Determinants of Health     Financial Resource Strain:     Difficulty of Paying Living Expenses: Not on file   Food Insecurity:     Worried About Running Out of Food in the Last Year: Not on file    Sherice of Food in the Last Year: Not on file   Transportation Needs:     Lack of Transportation (Medical): Not on file    Lack of Transportation (Non-Medical):  Not on file   Physical Activity:     Days of Exercise per Week: Not on file    Minutes of Exercise per Session: Not on file   Stress:     Feeling of Stress : Not on file   Social Connections:     Frequency of Communication with Friends and Family: Not on file    Frequency of Social Gatherings with Friends and Family: Not on file    Attends Jehovah's witness Services: Not on file    Active Member of [address] [place] [address] or Organizations: Not on file    Attends Club or Organization Meetings: Not on file    Marital Status: Not on file   Intimate Partner Violence:     Fear of Current or Ex-Partner: Not on file    Emotionally Abused: Not on file    Physically Abused: Not on file    Sexually Abused: Not on file   Housing Stability:     Unable to Pay for Housing in the Last Year: Not on file    Number of [place] in the Last Year: Not on file    Unstable Housing in the Last Year: Not on file      Family History   Problem Relation Age of Onset    No Known Problems Mother     No Known Problems Father      No current outpatient medications on file. No current facility-administered medications for this visit. No Known Allergies     Review of Systems:  Review of systems performed and negative with the exception of the above findings    OBJECTIVE:  /82   Wt 161 lb (73 kg)   BMI 23.10 kg/m²      Physical Exam:  General appearance: alert, appears stated age, cooperative and no distress  Head: Normocephalic, without obvious abnormality, atraumatic  Lungs: clear to auscultation bilaterally  Heart: regular rate and rhythm, S1, S2 normal, no murmur, click, rub or gallop  Abdomen: soft, non-distended, non-tender, midline incision healed, ostomy pink viable with normal enteric output    No visits with results within 6 Week(s) from this visit.    Latest known visit with results is:   Orders Only on 11/15/2021   Component Date Value Ref Range Status    PSA 11/15/2021 0.34  0.00 - 4.00 ng/mL Final    Sodium 11/15/2021 141  136 - 145 mmol/L Final    Potassium 11/15/2021 5.2* 3.5 - 5.1 mmol/L Final    Chloride 11/15/2021 103  99 - 110 mmol/L Final    CO2 11/15/2021 25  21 - 32 mmol/L Final    Anion Gap 11/15/2021 13  3 - 16 Final    Glucose 11/15/2021 83  70 - 99 mg/dL Final    BUN 11/15/2021 9  7 - 20 mg/dL Final    CREATININE 11/15/2021 0.7* 0.9 - 1.3 mg/dL Final    GFR Non- 11/15/2021 >60  >60 Final    Comment: >60 mL/min/1.73m2 EGFR, calc. for ages 25 and older using the  MDRD formula (not corrected for weight), is valid for stable  renal function.  GFR  11/15/2021 >60  >60 Final    Comment: Chronic Kidney Disease: less than 60 ml/min/1.73 sq.m. Kidney Failure: less than 15 ml/min/1.73 sq.m. Results valid for patients 18 years and older.       Calcium 11/15/2021 10.0  8.3 - 10.6 mg/dL Final    Total Protein 11/15/2021 7.2  6.4 - 8.2 g/dL Final    Albumin 11/15/2021 4.3  3.4 - 5.0 g/dL Final    Albumin/Globulin Ratio 11/15/2021 1.5  1.1 - 2.2 Final    Total Bilirubin 11/15/2021 0.3  0.0 - 1.0 mg/dL Final    Alkaline Phosphatase 11/15/2021 104  40 - 129 U/L Final    ALT 11/15/2021 38  10 - 40 U/L Final    AST 11/15/2021 21  15 - 37 U/L Final    WBC 11/15/2021 9.1  4.0 - 11.0 K/uL Final    RBC 11/15/2021 4.65  4.20 - 5.90 M/uL Final    Hemoglobin 11/15/2021 14.8  13.5 - 17.5 g/dL Final    Hematocrit 11/15/2021 44.6  40.5 - 52.5 % Final    MCV 11/15/2021 95.9  80.0 - 100.0 fL Final    MCH 11/15/2021 31.8  26.0 - 34.0 pg Final    MCHC 11/15/2021 33.2  31.0 - 36.0 g/dL Final    RDW 11/15/2021 13.5  12.4 - 15.4 % Final    Platelets 27/81/9066 344  135 - 450 K/uL Final    MPV 11/15/2021 9.3  5.0 - 10.5 fL Final    Neutrophils % 11/15/2021 59.5  % Final    Lymphocytes % 11/15/2021 25.6  % Final    Monocytes % 11/15/2021 7.7  % Final    Eosinophils % 11/15/2021 6.0  % Final    Basophils % 11/15/2021 1.2  % Final    Neutrophils Absolute 11/15/2021 5.4  1.7 - 7.7 K/uL Final    Lymphocytes Absolute 11/15/2021 2.3  1.0 - 5.1 K/uL Final    structures, anastomotic leak, laparotomy, possible development of an incisional hernia, possible requirement of additional procedures as well as the perioperative risks related to general anesthesia. The risk is approximately 5%. Increased risk of complication is associated with smoking, diabetes, obesity as well as heavy lifting over 20lbs sooner than 6 weeks after the surgery. Benefits include resolution of abdominal symptoms and return of normal bowel movements. The details of the procedure were discussed and all questions were answered. The patient understands, agrees, wishes to proceed  2. Will schedule for reversal of loop ileostomy, possible laparotomy  3. No bowel prep is necessary    Kirill Sales MD, FACS  2/16/2022  3:59 PM

## 2022-02-17 DIAGNOSIS — Z01.812 ENCOUNTER FOR PREOPERATIVE SCREENING LABORATORY TESTING FOR COVID-19 VIRUS: Primary | ICD-10-CM

## 2022-02-17 DIAGNOSIS — Z20.822 ENCOUNTER FOR PREOPERATIVE SCREENING LABORATORY TESTING FOR COVID-19 VIRUS: Primary | ICD-10-CM

## 2022-02-18 ENCOUNTER — TELEPHONE (OUTPATIENT)
Dept: SURGERY | Age: 52
End: 2022-02-18

## 2022-02-21 NOTE — PROGRESS NOTES
Name_______________________________________Printed:____________________  Date and time of surgery__03/01/22_____0730_________________Arrival Time:_____0600___________   1. The instructions given regarding when and if a patient needs to stop oral intake prior to surgery varies. Follow the specific instructions you were given                  _X__Nothing to eat or to drink after Midnight the night before.                   ____Carbo loading or ERAS instructions will be given to select patients-if you have been given those instructions -please do the following                           The evening before your surgery after dinner before midnight drink 40 ounces of gatorade. If you are diabetic use sugar free. The morning of surgery drink 40 ounces of water. This needs to be finished 3 hours prior to your surgery start time. 2. Take the following pills with a small sip of water on the morning of surgery__NA_________________________________________________                  Do not take blood pressure medications ending in pril or sartan the scott prior to surgery or the morning of surgery_   3. Aspirin, Ibuprofen, Advil, Naproxen, Vitamin E and other Anti-inflammatory products and supplements should be stopped for 5 -7days before surgery or as directed by your physician. 4. Check with your Doctor regarding stopping Plavix, Coumadin,Eliquis, Lovenox,Effient,Pradaxa,Xarelto, Fragmin or other blood thinners and follow their instructions. 5. Do not smoke, and do not drink any alcoholic beverages 24 hours prior to surgery. This includes NA Beer. Refrain from the usage of any recreational drugs. 6. You may brush your teeth and gargle the morning of surgery. DO NOT SWALLOW WATER   7. You MUST make arrangements for a responsible adult to stay on site while you are here and take you home after your surgery. You will not be allowed to leave alone or drive yourself home.   It is strongly suggested someone stay with you the first 24 hrs. Your surgery will be cancelled if you do not have a ride home. 8. A parent/legal guardian must accompany a child scheduled for surgery and plan to stay at the hospital until the child is discharged. Please do not bring other children with you. 9. Please wear simple, loose fitting clothing to the hospital.  Rajani Solis not bring valuables (money, credit cards, checkbooks, etc.) Do not wear any makeup (including no eye makeup) or nail polish on your fingers or toes. 10. DO NOT wear any jewelry or piercings on day of surgery. All body piercing jewelry must be removed. 11. If you have ___dentures, they will be removed before going to the OR; we will provide you a container. If you wear ___contact lenses or ___glasses, they will be removed; please bring a case for them. 12. Please see your family doctor/pediatrician for a history & physical and/or concerning medications. Bring any test results/reports from your physician's office. PCP__________________Phone___________H&P Appt. Date________             13 If you  have a Living Will and Durable Power of  for Healthcare, please bring in a copy. 15. Notify your Surgeon if you develop any illness between now and surgery  time, cough, cold, fever, sore throat, nausea, vomiting, etc.  Please notify your surgeon if you experience dizziness, shortness of breath or blurred vision between now & the time of your surgery             15. DO NOT shave your operative site 96 hours prior to surgery. For face & neck surgery, men may use an electric razor 48 hours prior to surgery. 16. Shower the night before or morning of surgery using an antibacterial soap or as you have been instructed. 17. To provide excellent care visitors will be limited to one in the room at any given time. 18.  Please bring picture ID and insurance card.              19.  Visit our web site for additional information:  [email or web address]              20.During flu season no children under the age of 15 are permitted in the hospital for the safety of all patients. 21. If you take a long acting insulin in the evening only  take half of your usual  dose the night  before your procedure              22. If you use a c-pap please bring DOS if staying overnight,             23.For your convenience [facility] has a pharmacy on site to fill your prescriptions. 24. If you use oxygen and have a portable tank please bring it  with you the DOS             25. Bring a complete list of all your medications with name and dose include any supplements. 26. Other__________________________________________   *Please call pre admission testing if you any further questions   [facility]    [address]  [phone]   [facility] [address] LANIE GAMBOA   [phone]           COVID TESTING    _X__ Covid test to be done 3-5 days prior to scheduled surgery -patient aware they are REQUIRED to bring a copy of the negative result DOS-if they receive a positive result to notify their surgeon         If known - indicate where patient is getting covid test done _____[facility] 02/24______________________________________________________    ___ Rapid - DOS    ___ Other___________________________________      Emily Scales POLICY(subject to change)    There is a one visitor policy at [facility] for all surgeries and endoscopies. Whether the visitor can stay or will be asked to wait in the car will depend on the current policy and if social distancing can be maintained. The policy is subject to change at any time. Please make sure the visitor has a cell phone that is on,charged and able to accept calls, as this may be the way that the staff communicates with them. Pain management is NO VISITOR policyThe patients ride is expected to remain in the car with a cell phone for communication. If the ride is leaving the hospital grounds please make sure they are back in time for pickup. Have the patient inform the staff on arrival what their rides plans are while the patient is in the facility. At the MAIN there is one visitor allowed. Please note that the visitor policy is subject to change. All above information reviewed with patient in person or by phone. Patient verbalizes understanding. All questions and concerns addressed.                                                                                                  Patient/Rep_Patient___________________                                                                                                                                    PRE OP INSTRUCTIONS

## 2022-02-24 DIAGNOSIS — Z20.822 ENCOUNTER FOR PREOPERATIVE SCREENING LABORATORY TESTING FOR COVID-19 VIRUS: ICD-10-CM

## 2022-02-24 DIAGNOSIS — Z01.812 ENCOUNTER FOR PREOPERATIVE SCREENING LABORATORY TESTING FOR COVID-19 VIRUS: ICD-10-CM

## 2022-02-25 LAB — SARS-COV-2: NOT DETECTED

## 2022-03-01 ENCOUNTER — ANESTHESIA EVENT (OUTPATIENT)
Dept: OPERATING ROOM | Age: 52
DRG: 331 | End: 2022-03-01
Payer: COMMERCIAL

## 2022-03-01 ENCOUNTER — HOSPITAL ENCOUNTER (INPATIENT)
Age: 52
LOS: 3 days | Discharge: HOME | DRG: 331 | End: 2022-03-04
Attending: SURGERY | Admitting: SURGERY
Payer: COMMERCIAL

## 2022-03-01 ENCOUNTER — ANESTHESIA (OUTPATIENT)
Dept: OPERATING ROOM | Age: 52
DRG: 331 | End: 2022-03-01
Payer: COMMERCIAL

## 2022-03-01 VITALS
TEMPERATURE: 94.8 F | DIASTOLIC BLOOD PRESSURE: 99 MMHG | OXYGEN SATURATION: 100 % | SYSTOLIC BLOOD PRESSURE: 149 MMHG | RESPIRATION RATE: 5 BRPM

## 2022-03-01 DIAGNOSIS — Z93.9 HISTORY OF CREATION OF OSTOMY (HCC): Primary | ICD-10-CM

## 2022-03-01 LAB
ABO/RH: NORMAL
ANION GAP SERPL CALCULATED.3IONS-SCNC: 11 MMOL/L (ref 3–16)
ANTIBODY SCREEN: NORMAL
APTT: 35.8 SEC (ref 26.2–38.6)
BUN BLDV-MCNC: 9 MG/DL (ref 7–20)
CALCIUM SERPL-MCNC: 9.7 MG/DL (ref 8.3–10.6)
CHLORIDE BLD-SCNC: 106 MMOL/L (ref 99–110)
CO2: 19 MMOL/L (ref 21–32)
CREAT SERPL-MCNC: 0.7 MG/DL (ref 0.9–1.3)
GFR AFRICAN AMERICAN: >60
GFR NON-AFRICAN AMERICAN: >60
GLUCOSE BLD-MCNC: 103 MG/DL (ref 70–99)
HCT VFR BLD CALC: 39.9 % (ref 40.5–52.5)
HCT VFR BLD CALC: 50.4 % (ref 40.5–52.5)
HEMOGLOBIN: 13.3 G/DL (ref 13.5–17.5)
HEMOGLOBIN: 17.1 G/DL (ref 13.5–17.5)
INR BLD: 0.94 (ref 0.88–1.12)
MCH RBC QN AUTO: 31.7 PG (ref 26–34)
MCH RBC QN AUTO: 32.1 PG (ref 26–34)
MCHC RBC AUTO-ENTMCNC: 33.2 G/DL (ref 31–36)
MCHC RBC AUTO-ENTMCNC: 33.9 G/DL (ref 31–36)
MCV RBC AUTO: 94.7 FL (ref 80–100)
MCV RBC AUTO: 95.5 FL (ref 80–100)
PDW BLD-RTO: 13.9 % (ref 12.4–15.4)
PDW BLD-RTO: 14.3 % (ref 12.4–15.4)
PLATELET # BLD: 214 K/UL (ref 135–450)
PLATELET # BLD: 241 K/UL (ref 135–450)
PMV BLD AUTO: 8.3 FL (ref 5–10.5)
PMV BLD AUTO: 8.5 FL (ref 5–10.5)
POTASSIUM SERPL-SCNC: 4.3 MMOL/L (ref 3.5–5.1)
PROTHROMBIN TIME: 10.6 SEC (ref 9.9–12.7)
RBC # BLD: 4.18 M/UL (ref 4.2–5.9)
RBC # BLD: 5.32 M/UL (ref 4.2–5.9)
SODIUM BLD-SCNC: 136 MMOL/L (ref 136–145)
WBC # BLD: 17.3 K/UL (ref 4–11)
WBC # BLD: 5.6 K/UL (ref 4–11)

## 2022-03-01 PROCEDURE — 6360000002 HC RX W HCPCS: Performed by: SURGERY

## 2022-03-01 PROCEDURE — 44625 REPAIR BOWEL OPENING: CPT | Performed by: SURGERY

## 2022-03-01 PROCEDURE — 2580000003 HC RX 258: Performed by: NURSE ANESTHETIST, CERTIFIED REGISTERED

## 2022-03-01 PROCEDURE — 7100000000 HC PACU RECOVERY - FIRST 15 MIN: Performed by: SURGERY

## 2022-03-01 PROCEDURE — 2720000010 HC SURG SUPPLY STERILE: Performed by: SURGERY

## 2022-03-01 PROCEDURE — 6370000000 HC RX 637 (ALT 250 FOR IP): Performed by: SURGERY

## 2022-03-01 PROCEDURE — 3600000004 HC SURGERY LEVEL 4 BASE: Performed by: SURGERY

## 2022-03-01 PROCEDURE — 0DBB0ZZ EXCISION OF ILEUM, OPEN APPROACH: ICD-10-PCS | Performed by: SURGERY

## 2022-03-01 PROCEDURE — 86900 BLOOD TYPING SEROLOGIC ABO: CPT

## 2022-03-01 PROCEDURE — 7100000001 HC PACU RECOVERY - ADDTL 15 MIN: Performed by: SURGERY

## 2022-03-01 PROCEDURE — A4217 STERILE WATER/SALINE, 500 ML: HCPCS | Performed by: SURGERY

## 2022-03-01 PROCEDURE — 3700000000 HC ANESTHESIA ATTENDED CARE: Performed by: SURGERY

## 2022-03-01 PROCEDURE — 36415 COLL VENOUS BLD VENIPUNCTURE: CPT

## 2022-03-01 PROCEDURE — 1200000000 HC SEMI PRIVATE

## 2022-03-01 PROCEDURE — 86850 RBC ANTIBODY SCREEN: CPT

## 2022-03-01 PROCEDURE — 3600000014 HC SURGERY LEVEL 4 ADDTL 15MIN: Performed by: SURGERY

## 2022-03-01 PROCEDURE — 88304 TISSUE EXAM BY PATHOLOGIST: CPT

## 2022-03-01 PROCEDURE — 80048 BASIC METABOLIC PNL TOTAL CA: CPT

## 2022-03-01 PROCEDURE — 85730 THROMBOPLASTIN TIME PARTIAL: CPT

## 2022-03-01 PROCEDURE — 6360000002 HC RX W HCPCS: Performed by: NURSE ANESTHETIST, CERTIFIED REGISTERED

## 2022-03-01 PROCEDURE — APPNB30 APP NON BILLABLE TIME 0-30 MINS: Performed by: NURSE PRACTITIONER

## 2022-03-01 PROCEDURE — 86901 BLOOD TYPING SEROLOGIC RH(D): CPT

## 2022-03-01 PROCEDURE — 85610 PROTHROMBIN TIME: CPT

## 2022-03-01 PROCEDURE — 2580000003 HC RX 258: Performed by: SURGERY

## 2022-03-01 PROCEDURE — 85027 COMPLETE CBC AUTOMATED: CPT

## 2022-03-01 PROCEDURE — 2500000003 HC RX 250 WO HCPCS: Performed by: SURGERY

## 2022-03-01 PROCEDURE — 6360000002 HC RX W HCPCS: Performed by: NURSE PRACTITIONER

## 2022-03-01 PROCEDURE — 6360000002 HC RX W HCPCS: Performed by: ANESTHESIOLOGY

## 2022-03-01 PROCEDURE — 2709999900 HC NON-CHARGEABLE SUPPLY: Performed by: SURGERY

## 2022-03-01 PROCEDURE — 2500000003 HC RX 250 WO HCPCS: Performed by: NURSE ANESTHETIST, CERTIFIED REGISTERED

## 2022-03-01 PROCEDURE — 3700000001 HC ADD 15 MINUTES (ANESTHESIA): Performed by: SURGERY

## 2022-03-01 RX ORDER — ONDANSETRON 2 MG/ML
INJECTION INTRAMUSCULAR; INTRAVENOUS PRN
Status: DISCONTINUED | OUTPATIENT
Start: 2022-03-01 | End: 2022-03-01 | Stop reason: SDUPTHER

## 2022-03-01 RX ORDER — SODIUM CHLORIDE 9 MG/ML
INJECTION, SOLUTION INTRAVENOUS CONTINUOUS
Status: DISCONTINUED | OUTPATIENT
Start: 2022-03-01 | End: 2022-03-04

## 2022-03-01 RX ORDER — LIDOCAINE HYDROCHLORIDE 10 MG/ML
1 INJECTION, SOLUTION EPIDURAL; INFILTRATION; INTRACAUDAL; PERINEURAL
Status: ACTIVE | OUTPATIENT
Start: 2022-03-01 | End: 2022-03-01

## 2022-03-01 RX ORDER — LABETALOL HYDROCHLORIDE 5 MG/ML
10 INJECTION, SOLUTION INTRAVENOUS
Status: DISCONTINUED | OUTPATIENT
Start: 2022-03-01 | End: 2022-03-01 | Stop reason: HOSPADM

## 2022-03-01 RX ORDER — MORPHINE SULFATE 4 MG/ML
4 INJECTION, SOLUTION INTRAMUSCULAR; INTRAVENOUS
Status: DISCONTINUED | OUTPATIENT
Start: 2022-03-01 | End: 2022-03-04

## 2022-03-01 RX ORDER — ALVIMOPAN 12 MG/1
12 CAPSULE ORAL 2 TIMES DAILY
Status: DISCONTINUED | OUTPATIENT
Start: 2022-03-01 | End: 2022-03-03

## 2022-03-01 RX ORDER — KETAMINE HCL IN NACL, ISO-OSM 100MG/10ML
SYRINGE (ML) INJECTION PRN
Status: DISCONTINUED | OUTPATIENT
Start: 2022-03-01 | End: 2022-03-01 | Stop reason: SDUPTHER

## 2022-03-01 RX ORDER — SUCCINYLCHOLINE/SOD CL,ISO/PF 200MG/10ML
SYRINGE (ML) INTRAVENOUS PRN
Status: DISCONTINUED | OUTPATIENT
Start: 2022-03-01 | End: 2022-03-01 | Stop reason: SDUPTHER

## 2022-03-01 RX ORDER — PROMETHAZINE HYDROCHLORIDE 25 MG/1
12.5 TABLET ORAL EVERY 6 HOURS PRN
Status: DISCONTINUED | OUTPATIENT
Start: 2022-03-01 | End: 2022-03-04 | Stop reason: HOSPADM

## 2022-03-01 RX ORDER — MIDAZOLAM HYDROCHLORIDE 1 MG/ML
INJECTION INTRAMUSCULAR; INTRAVENOUS PRN
Status: DISCONTINUED | OUTPATIENT
Start: 2022-03-01 | End: 2022-03-01 | Stop reason: SDUPTHER

## 2022-03-01 RX ORDER — LIDOCAINE HYDROCHLORIDE 20 MG/ML
INJECTION, SOLUTION EPIDURAL; INFILTRATION; INTRACAUDAL; PERINEURAL PRN
Status: DISCONTINUED | OUTPATIENT
Start: 2022-03-01 | End: 2022-03-01 | Stop reason: SDUPTHER

## 2022-03-01 RX ORDER — GLYCOPYRROLATE 1 MG/5 ML
SYRINGE (ML) INTRAVENOUS PRN
Status: DISCONTINUED | OUTPATIENT
Start: 2022-03-01 | End: 2022-03-01 | Stop reason: SDUPTHER

## 2022-03-01 RX ORDER — HYDROMORPHONE HCL 110MG/55ML
PATIENT CONTROLLED ANALGESIA SYRINGE INTRAVENOUS PRN
Status: DISCONTINUED | OUTPATIENT
Start: 2022-03-01 | End: 2022-03-01 | Stop reason: SDUPTHER

## 2022-03-01 RX ORDER — HYDRALAZINE HYDROCHLORIDE 20 MG/ML
10 INJECTION INTRAMUSCULAR; INTRAVENOUS
Status: DISCONTINUED | OUTPATIENT
Start: 2022-03-01 | End: 2022-03-01 | Stop reason: HOSPADM

## 2022-03-01 RX ORDER — SODIUM CHLORIDE 0.9 % (FLUSH) 0.9 %
5-40 SYRINGE (ML) INJECTION EVERY 12 HOURS SCHEDULED
Status: DISCONTINUED | OUTPATIENT
Start: 2022-03-01 | End: 2022-03-04 | Stop reason: HOSPADM

## 2022-03-01 RX ORDER — ALVIMOPAN 12 MG/1
12 CAPSULE ORAL ONCE
Status: COMPLETED | OUTPATIENT
Start: 2022-03-01 | End: 2022-03-01

## 2022-03-01 RX ORDER — ONDANSETRON 2 MG/ML
4 INJECTION INTRAMUSCULAR; INTRAVENOUS
Status: DISCONTINUED | OUTPATIENT
Start: 2022-03-01 | End: 2022-03-01 | Stop reason: HOSPADM

## 2022-03-01 RX ORDER — OXYCODONE HYDROCHLORIDE 5 MG/1
5 TABLET ORAL
Status: DISCONTINUED | OUTPATIENT
Start: 2022-03-01 | End: 2022-03-01 | Stop reason: HOSPADM

## 2022-03-01 RX ORDER — SODIUM CHLORIDE, SODIUM LACTATE, POTASSIUM CHLORIDE, CALCIUM CHLORIDE 600; 310; 30; 20 MG/100ML; MG/100ML; MG/100ML; MG/100ML
INJECTION, SOLUTION INTRAVENOUS CONTINUOUS PRN
Status: DISCONTINUED | OUTPATIENT
Start: 2022-03-01 | End: 2022-03-01 | Stop reason: SDUPTHER

## 2022-03-01 RX ORDER — HYDROMORPHONE HYDROCHLORIDE 1 MG/ML
0.5 INJECTION, SOLUTION INTRAMUSCULAR; INTRAVENOUS; SUBCUTANEOUS
Status: DISCONTINUED | OUTPATIENT
Start: 2022-03-01 | End: 2022-03-04

## 2022-03-01 RX ORDER — ROCURONIUM BROMIDE 10 MG/ML
INJECTION, SOLUTION INTRAVENOUS PRN
Status: DISCONTINUED | OUTPATIENT
Start: 2022-03-01 | End: 2022-03-01 | Stop reason: SDUPTHER

## 2022-03-01 RX ORDER — MAGNESIUM HYDROXIDE 1200 MG/15ML
LIQUID ORAL CONTINUOUS PRN
Status: COMPLETED | OUTPATIENT
Start: 2022-03-01 | End: 2022-03-01

## 2022-03-01 RX ORDER — SODIUM CHLORIDE 9 MG/ML
25 INJECTION, SOLUTION INTRAVENOUS PRN
Status: DISCONTINUED | OUTPATIENT
Start: 2022-03-01 | End: 2022-03-04 | Stop reason: HOSPADM

## 2022-03-01 RX ORDER — OXYCODONE HYDROCHLORIDE 5 MG/1
10 TABLET ORAL EVERY 4 HOURS PRN
Status: DISCONTINUED | OUTPATIENT
Start: 2022-03-01 | End: 2022-03-04 | Stop reason: HOSPADM

## 2022-03-01 RX ORDER — SODIUM CHLORIDE 0.9 % (FLUSH) 0.9 %
5-40 SYRINGE (ML) INJECTION PRN
Status: DISCONTINUED | OUTPATIENT
Start: 2022-03-01 | End: 2022-03-01 | Stop reason: HOSPADM

## 2022-03-01 RX ORDER — OXYCODONE HYDROCHLORIDE 5 MG/1
5 TABLET ORAL EVERY 4 HOURS PRN
Status: DISCONTINUED | OUTPATIENT
Start: 2022-03-01 | End: 2022-03-04 | Stop reason: HOSPADM

## 2022-03-01 RX ORDER — PROMETHAZINE HYDROCHLORIDE 25 MG/ML
25 INJECTION, SOLUTION INTRAMUSCULAR; INTRAVENOUS ONCE
Status: COMPLETED | OUTPATIENT
Start: 2022-03-01 | End: 2022-03-01

## 2022-03-01 RX ORDER — HYDROMORPHONE HYDROCHLORIDE 1 MG/ML
1 INJECTION, SOLUTION INTRAMUSCULAR; INTRAVENOUS; SUBCUTANEOUS
Status: DISCONTINUED | OUTPATIENT
Start: 2022-03-01 | End: 2022-03-04

## 2022-03-01 RX ORDER — SODIUM CHLORIDE 9 MG/ML
25 INJECTION, SOLUTION INTRAVENOUS PRN
Status: DISCONTINUED | OUTPATIENT
Start: 2022-03-01 | End: 2022-03-01 | Stop reason: HOSPADM

## 2022-03-01 RX ORDER — SODIUM CHLORIDE 0.9 % (FLUSH) 0.9 %
5-40 SYRINGE (ML) INJECTION PRN
Status: DISCONTINUED | OUTPATIENT
Start: 2022-03-01 | End: 2022-03-04 | Stop reason: HOSPADM

## 2022-03-01 RX ORDER — HYDROMORPHONE HCL 110MG/55ML
0.5 PATIENT CONTROLLED ANALGESIA SYRINGE INTRAVENOUS EVERY 5 MIN PRN
Status: DISCONTINUED | OUTPATIENT
Start: 2022-03-01 | End: 2022-03-01 | Stop reason: HOSPADM

## 2022-03-01 RX ORDER — SODIUM CHLORIDE 0.9 % (FLUSH) 0.9 %
5-40 SYRINGE (ML) INJECTION EVERY 12 HOURS SCHEDULED
Status: DISCONTINUED | OUTPATIENT
Start: 2022-03-01 | End: 2022-03-01 | Stop reason: HOSPADM

## 2022-03-01 RX ORDER — MEPERIDINE HYDROCHLORIDE 25 MG/ML
12.5 INJECTION INTRAMUSCULAR; INTRAVENOUS; SUBCUTANEOUS EVERY 5 MIN PRN
Status: DISCONTINUED | OUTPATIENT
Start: 2022-03-01 | End: 2022-03-01 | Stop reason: HOSPADM

## 2022-03-01 RX ORDER — DOCUSATE SODIUM 100 MG/1
100 CAPSULE, LIQUID FILLED ORAL 2 TIMES DAILY
Status: DISCONTINUED | OUTPATIENT
Start: 2022-03-01 | End: 2022-03-03

## 2022-03-01 RX ORDER — DEXAMETHASONE SODIUM PHOSPHATE 4 MG/ML
INJECTION, SOLUTION INTRA-ARTICULAR; INTRALESIONAL; INTRAMUSCULAR; INTRAVENOUS; SOFT TISSUE PRN
Status: DISCONTINUED | OUTPATIENT
Start: 2022-03-01 | End: 2022-03-01 | Stop reason: SDUPTHER

## 2022-03-01 RX ORDER — ACETAMINOPHEN 325 MG/1
650 TABLET ORAL EVERY 6 HOURS
Status: DISCONTINUED | OUTPATIENT
Start: 2022-03-01 | End: 2022-03-04 | Stop reason: HOSPADM

## 2022-03-01 RX ORDER — PROPOFOL 10 MG/ML
INJECTION, EMULSION INTRAVENOUS PRN
Status: DISCONTINUED | OUTPATIENT
Start: 2022-03-01 | End: 2022-03-01 | Stop reason: SDUPTHER

## 2022-03-01 RX ORDER — POLYETHYLENE GLYCOL 3350 17 G/17G
17 POWDER, FOR SOLUTION ORAL DAILY
Status: DISCONTINUED | OUTPATIENT
Start: 2022-03-01 | End: 2022-03-03

## 2022-03-01 RX ORDER — MORPHINE SULFATE 2 MG/ML
2 INJECTION, SOLUTION INTRAMUSCULAR; INTRAVENOUS
Status: DISCONTINUED | OUTPATIENT
Start: 2022-03-01 | End: 2022-03-04

## 2022-03-01 RX ORDER — FENTANYL CITRATE 50 UG/ML
INJECTION, SOLUTION INTRAMUSCULAR; INTRAVENOUS PRN
Status: DISCONTINUED | OUTPATIENT
Start: 2022-03-01 | End: 2022-03-01 | Stop reason: SDUPTHER

## 2022-03-01 RX ORDER — ONDANSETRON 2 MG/ML
4 INJECTION INTRAMUSCULAR; INTRAVENOUS EVERY 6 HOURS PRN
Status: DISCONTINUED | OUTPATIENT
Start: 2022-03-01 | End: 2022-03-04 | Stop reason: HOSPADM

## 2022-03-01 RX ADMIN — HYDROMORPHONE HYDROCHLORIDE 0.5 MG: 2 INJECTION, SOLUTION INTRAMUSCULAR; INTRAVENOUS; SUBCUTANEOUS at 09:15

## 2022-03-01 RX ADMIN — ONDANSETRON 4 MG: 2 INJECTION INTRAMUSCULAR; INTRAVENOUS at 23:56

## 2022-03-01 RX ADMIN — ROCURONIUM BROMIDE 40 MG: 10 INJECTION, SOLUTION INTRAVENOUS at 07:41

## 2022-03-01 RX ADMIN — SUGAMMADEX 200 MG: 100 INJECTION, SOLUTION INTRAVENOUS at 08:38

## 2022-03-01 RX ADMIN — SODIUM CHLORIDE, POTASSIUM CHLORIDE, SODIUM LACTATE AND CALCIUM CHLORIDE: 600; 310; 30; 20 INJECTION, SOLUTION INTRAVENOUS at 07:28

## 2022-03-01 RX ADMIN — Medication 30 MG: at 07:46

## 2022-03-01 RX ADMIN — ACETAMINOPHEN 650 MG: 325 TABLET ORAL at 17:08

## 2022-03-01 RX ADMIN — HYDROMORPHONE HYDROCHLORIDE 1 MG: 1 INJECTION, SOLUTION INTRAMUSCULAR; INTRAVENOUS; SUBCUTANEOUS at 13:38

## 2022-03-01 RX ADMIN — PROMETHAZINE HYDROCHLORIDE 25 MG: 25 INJECTION INTRAMUSCULAR; INTRAVENOUS at 20:41

## 2022-03-01 RX ADMIN — CEFAZOLIN 2000 MG: 10 INJECTION, POWDER, FOR SOLUTION INTRAVENOUS at 23:57

## 2022-03-01 RX ADMIN — HYDROMORPHONE HYDROCHLORIDE 0.4 MG: 2 INJECTION, SOLUTION INTRAMUSCULAR; INTRAVENOUS; SUBCUTANEOUS at 08:57

## 2022-03-01 RX ADMIN — Medication 160 MG: at 07:33

## 2022-03-01 RX ADMIN — HYDROMORPHONE HYDROCHLORIDE 0.4 MG: 2 INJECTION, SOLUTION INTRAMUSCULAR; INTRAVENOUS; SUBCUTANEOUS at 08:54

## 2022-03-01 RX ADMIN — HYDROMORPHONE HYDROCHLORIDE 0.4 MG: 2 INJECTION, SOLUTION INTRAMUSCULAR; INTRAVENOUS; SUBCUTANEOUS at 08:51

## 2022-03-01 RX ADMIN — MORPHINE SULFATE 4 MG: 4 INJECTION INTRAVENOUS at 11:13

## 2022-03-01 RX ADMIN — HYDROMORPHONE HYDROCHLORIDE 1 MG: 1 INJECTION, SOLUTION INTRAMUSCULAR; INTRAVENOUS; SUBCUTANEOUS at 17:08

## 2022-03-01 RX ADMIN — ALVIMOPAN 12 MG: 12 CAPSULE ORAL at 07:11

## 2022-03-01 RX ADMIN — ROCURONIUM BROMIDE 10 MG: 10 INJECTION, SOLUTION INTRAVENOUS at 08:22

## 2022-03-01 RX ADMIN — METRONIDAZOLE 500 MG: 500 INJECTION, SOLUTION INTRAVENOUS at 11:14

## 2022-03-01 RX ADMIN — ONDANSETRON 4 MG: 2 INJECTION INTRAMUSCULAR; INTRAVENOUS at 08:30

## 2022-03-01 RX ADMIN — MIDAZOLAM 2 MG: 1 INJECTION INTRAMUSCULAR; INTRAVENOUS at 07:30

## 2022-03-01 RX ADMIN — SODIUM CHLORIDE, POTASSIUM CHLORIDE, SODIUM LACTATE AND CALCIUM CHLORIDE: 600; 310; 30; 20 INJECTION, SOLUTION INTRAVENOUS at 08:20

## 2022-03-01 RX ADMIN — SODIUM CHLORIDE: 9 INJECTION, SOLUTION INTRAVENOUS at 20:01

## 2022-03-01 RX ADMIN — FENTANYL CITRATE 100 MCG: 50 INJECTION, SOLUTION INTRAMUSCULAR; INTRAVENOUS at 07:33

## 2022-03-01 RX ADMIN — LIDOCAINE HYDROCHLORIDE 100 MG: 20 INJECTION, SOLUTION EPIDURAL; INFILTRATION; INTRACAUDAL; PERINEURAL at 07:33

## 2022-03-01 RX ADMIN — METRONIDAZOLE 500 MG: 500 INJECTION, SOLUTION INTRAVENOUS at 18:56

## 2022-03-01 RX ADMIN — PROPOFOL 200 MG: 10 INJECTION, EMULSION INTRAVENOUS at 07:33

## 2022-03-01 RX ADMIN — DOCUSATE SODIUM 100 MG: 100 CAPSULE, LIQUID FILLED ORAL at 11:13

## 2022-03-01 RX ADMIN — ACETAMINOPHEN 650 MG: 325 TABLET ORAL at 23:56

## 2022-03-01 RX ADMIN — Medication 0.2 MG: at 07:47

## 2022-03-01 RX ADMIN — POLYETHYLENE GLYCOL 3350 17 G: 17 POWDER, FOR SOLUTION ORAL at 11:13

## 2022-03-01 RX ADMIN — HYDROMORPHONE HYDROCHLORIDE 0.5 MG: 2 INJECTION, SOLUTION INTRAMUSCULAR; INTRAVENOUS; SUBCUTANEOUS at 09:05

## 2022-03-01 RX ADMIN — CEFAZOLIN 2000 MG: 10 INJECTION, POWDER, FOR SOLUTION INTRAVENOUS at 17:09

## 2022-03-01 RX ADMIN — HYDROMORPHONE HYDROCHLORIDE 0.4 MG: 2 INJECTION, SOLUTION INTRAMUSCULAR; INTRAVENOUS; SUBCUTANEOUS at 08:14

## 2022-03-01 RX ADMIN — SODIUM CHLORIDE: 9 INJECTION, SOLUTION INTRAVENOUS at 09:20

## 2022-03-01 RX ADMIN — DEXAMETHASONE SODIUM PHOSPHATE 8 MG: 4 INJECTION, SOLUTION INTRAMUSCULAR; INTRAVENOUS at 07:48

## 2022-03-01 RX ADMIN — ONDANSETRON 4 MG: 2 INJECTION INTRAMUSCULAR; INTRAVENOUS at 17:54

## 2022-03-01 RX ADMIN — HYDROMORPHONE HYDROCHLORIDE 0.5 MG: 2 INJECTION, SOLUTION INTRAMUSCULAR; INTRAVENOUS; SUBCUTANEOUS at 09:50

## 2022-03-01 RX ADMIN — HYDROMORPHONE HYDROCHLORIDE 0.4 MG: 2 INJECTION, SOLUTION INTRAMUSCULAR; INTRAVENOUS; SUBCUTANEOUS at 08:31

## 2022-03-01 RX ADMIN — CEFAZOLIN 2000 MG: 10 INJECTION, POWDER, FOR SOLUTION INTRAVENOUS at 07:28

## 2022-03-01 RX ADMIN — ENOXAPARIN SODIUM 40 MG: 40 INJECTION SUBCUTANEOUS at 20:49

## 2022-03-01 RX ADMIN — ACETAMINOPHEN 650 MG: 325 TABLET ORAL at 11:12

## 2022-03-01 RX ADMIN — ROCURONIUM BROMIDE 10 MG: 10 INJECTION, SOLUTION INTRAVENOUS at 07:33

## 2022-03-01 ASSESSMENT — PULMONARY FUNCTION TESTS
PIF_VALUE: 1
PIF_VALUE: 14
PIF_VALUE: 30
PIF_VALUE: 14
PIF_VALUE: 14
PIF_VALUE: 16
PIF_VALUE: 15
PIF_VALUE: 6
PIF_VALUE: 14
PIF_VALUE: 1
PIF_VALUE: 14
PIF_VALUE: 16
PIF_VALUE: 42
PIF_VALUE: 14
PIF_VALUE: 2
PIF_VALUE: 13
PIF_VALUE: 14
PIF_VALUE: 3
PIF_VALUE: 14
PIF_VALUE: 14
PIF_VALUE: 23
PIF_VALUE: 14
PIF_VALUE: 31
PIF_VALUE: 14
PIF_VALUE: 17
PIF_VALUE: 14
PIF_VALUE: 3
PIF_VALUE: 14
PIF_VALUE: 12
PIF_VALUE: 14
PIF_VALUE: 16
PIF_VALUE: 15
PIF_VALUE: 14
PIF_VALUE: 14
PIF_VALUE: 15
PIF_VALUE: 14
PIF_VALUE: 14
PIF_VALUE: 28
PIF_VALUE: 14
PIF_VALUE: 14
PIF_VALUE: 16
PIF_VALUE: 14
PIF_VALUE: 15
PIF_VALUE: 14
PIF_VALUE: 15
PIF_VALUE: 14

## 2022-03-01 ASSESSMENT — PAIN SCALES - GENERAL
PAINLEVEL_OUTOF10: 7
PAINLEVEL_OUTOF10: 5
PAINLEVEL_OUTOF10: 7
PAINLEVEL_OUTOF10: 7
PAINLEVEL_OUTOF10: 0
PAINLEVEL_OUTOF10: 7
PAINLEVEL_OUTOF10: 7
PAINLEVEL_OUTOF10: 5
PAINLEVEL_OUTOF10: 3
PAINLEVEL_OUTOF10: 7
PAINLEVEL_OUTOF10: 6
PAINLEVEL_OUTOF10: 7
PAINLEVEL_OUTOF10: 6
PAINLEVEL_OUTOF10: 7

## 2022-03-01 ASSESSMENT — PAIN DESCRIPTION - ORIENTATION
ORIENTATION: LOWER;MID
ORIENTATION: MID
ORIENTATION: RIGHT;LOWER

## 2022-03-01 ASSESSMENT — PAIN DESCRIPTION - ONSET: ONSET: ON-GOING

## 2022-03-01 ASSESSMENT — LIFESTYLE VARIABLES: SMOKING_STATUS: 1

## 2022-03-01 ASSESSMENT — PAIN DESCRIPTION - LOCATION
LOCATION: ABDOMEN

## 2022-03-01 ASSESSMENT — PAIN DESCRIPTION - FREQUENCY
FREQUENCY: CONTINUOUS

## 2022-03-01 ASSESSMENT — PAIN DESCRIPTION - DESCRIPTORS
DESCRIPTORS: DISCOMFORT
DESCRIPTORS: SHARP
DESCRIPTORS: SORE;ACHING
DESCRIPTORS: DISCOMFORT

## 2022-03-01 ASSESSMENT — PAIN - FUNCTIONAL ASSESSMENT: PAIN_FUNCTIONAL_ASSESSMENT: 0-10

## 2022-03-01 ASSESSMENT — PAIN DESCRIPTION - PAIN TYPE
TYPE: SURGICAL PAIN

## 2022-03-01 ASSESSMENT — PAIN DESCRIPTION - PROGRESSION: CLINICAL_PROGRESSION: NOT CHANGED

## 2022-03-01 NOTE — ANESTHESIA POSTPROCEDURE EVALUATION
Department of Anesthesiology  Postprocedure Note    Patient: Leo Foote  MRN: [id number]  YOB: 1970  Date of evaluation: 3/1/2022  Time:  9:16 AM     Procedure Summary     Date: 03/01/22 Room / Location: [address]    Anesthesia Start: 0730 Anesthesia Stop: 6727    Procedure: OPEN REVERSAL OF LOOP ILEOSTOMY (N/A Abdomen) Diagnosis: (Z93.2  ILEOSTOMY IN PLACE)    Surgeons: Joenathan Blizzard, MD Responsible Provider: Nathaly Valentin MD    Anesthesia Type: general ASA Status: 2          Anesthesia Type: general    Akash Phase I: Akash Score: 9    Akash Phase II:      Last vitals: Reviewed and per EMR flowsheets.        Anesthesia Post Evaluation    Patient location during evaluation: PACU  Patient participation: complete - patient participated  Level of consciousness: awake and alert  Airway patency: patent  Nausea & Vomiting: no vomiting and no nausea  Complications: no  Cardiovascular status: hemodynamically stable  Respiratory status: acceptable  Hydration status: stable

## 2022-03-01 NOTE — BRIEF OP NOTE
[address] and Laparoscopic Surgery  Brief Operative Note  Pt Name: Aroldo Aguero  CSN: [id number]  YOB: 1970    Date of Procedure: 3/1/2022    Pre-operative Diagnosis: History of ileostomy    Post-operative Diagnosis: same     Procedure: Reversal of loop ileostomy with resection and anastomosis    Surgeon(s):  Avtar Galloway MD     Surgical Assistant: Roseline Santillan    Anesthesia:  General anesthesia    Findings: Full note dictated, Dictation Job Number: [id number]    Estimated Blood Loss: less than 50  ml    Complications: None    Specimens: ileostomy  ID Type Source Tests Collected by Time Destination   A : A) ILEOSTOMY Tissue Tissue SURGICAL PATHOLOGY Avtar Galloway MD 3/1/2022 0818       Implants:  * No implants in log *    Drains: none   Closed/Suction Drain Left Abdomen Bulb 19 [place] (Active)       Ileostomy Ileostomy RLQ (Active)       Urethral Catheter Non-latex;Straight-tip 16 fr (Active)       Condition: stable     Disposition and Post-operative plan: PACU, med/surg linder     Kirill Rich MD, FACS  3/1/2022  8:39 AM

## 2022-03-01 NOTE — CARE COORDINATION
Discharge Planning Note:    Chart reviewed and it appears that patient has minimal needs for discharge at this time. Discussed with patients nurse and requested that case management be notified if discharge needs are identified. Case management will continue to follow progress and update discharge plan as needed.       Risk of Readmission Score: 6%    DHARA Kaufman RN      Phone: [phone]

## 2022-03-01 NOTE — PROGRESS NOTES
Incentive Spirometry education and demonstration completed by Respiratory Therapy Yes      Response to education: Very Good     Teaching Time: 5 minutes    Minimum Predicted Vital Capacity - 730 mL. Patient's Actual Vital Capacity - 1000 mL. Turning over to Nursing for routine follow-up Yes.     Comments:     Electronically signed by Meryl Allan RCP on 3/1/2022 at 5:28 PM

## 2022-03-01 NOTE — ANESTHESIA PRE PROCEDURE
Department of Anesthesiology  Preprocedure Note       Name:  Aroldo Aguero   Age:  49 y.o.  :  1970                                          MRN:  [id number]         Date:  3/1/2022      Surgeon: Maryann Alexandre):  Avtar Galloway MD    Procedure: Procedure(s):  OPEN REVERSAL OF LOOP ILEOSTOMY  WITH POSSIBLE LAPAROTOMY    Medications prior to admission:   Prior to Admission medications    Not on File       Current medications:    Current Facility-Administered Medications   Medication Dose Route Frequency Provider Last Rate Last Admin    0.9 % sodium chloride infusion  25 mL IntraVENous PRN Avtar Galloway MD        ceFAZolin (ANCEF) 2000 mg in dextrose 5 % 50 mL IVPB  2,000 mg IntraVENous On Call to [address] Anne MD        sodium chloride flush 0.9 % injection 5-40 mL  5-40 mL IntraVENous 2 times per day Avtar Galloway MD        sodium chloride flush 0.9 % injection 5-40 mL  5-40 mL IntraVENous PRN Avtar Galloway MD        meperidine (DEMEROL) injection 12.5 mg  12.5 mg IntraVENous Q5 Min PRN Nayeli Irene MD        HYDROmorphone (DILAUDID) injection 0.5 mg  0.5 mg IntraVENous Q5 Min PRN Nayeli Irene MD        oxyCODONE (ROXICODONE) immediate release tablet 5 mg  5 mg Oral Once PRN Nayeli Irene MD        ondansetron [facility]) injection 4 mg  4 mg IntraVENous Once PRN Nayeli Irene MD        labetalol (NORMODYNE;TRANDATE) injection 10 mg  10 mg IntraVENous Q15 Min PRN Nayeli Irene MD        Or    hydrALAZINE (APRESOLINE) injection 10 mg  10 mg IntraVENous Q15 Min PRN Nayeli Irene MD           Allergies:  No Known Allergies    Problem List:    Patient Active Problem List   Diagnosis Code    Other male erectile dysfunction N52.8    Complex care coordination Z71.89    Cigarette nicotine dependence without complication H28.130    Intra-abdominal abscess ([facility] [address] K65.1    Colonic diverticular abscess K57.20    Perforated sigmoid colon ([facility] [address] K63.1  Hepatic steatosis K76.0    History of open sigmoidectomy Z98.890, Z90.49    Receiving intravenous antibiotic treatment as outpatient Z79.2    Renal lesion N28.9    History of creation of ostomy ([address] Z93.9       Past Medical History:        Diagnosis Date    Renal lesion 11/15/2021       Past Surgical History:        Procedure Laterality Date    COLOSTOMY N/A 10/27/2021    EXPLORATORY LAPAROTOMY, SIGMOID RESECTION, ILEOSTOMY. performed by Percy Chawla MD at [address] [place] [address] History:    Social History     Tobacco Use    Smoking status: Current Every Day Smoker     Types: Cigarettes    Smokeless tobacco: Never Used   Substance Use Topics    Alcohol use: Yes     Comment: Daily                                Ready to quit: Not Answered  Counseling given: Not Answered      Vital Signs (Current):   Vitals:    02/21/22 1213 03/01/22 0616 03/01/22 0618   BP:   120/80   Pulse:  67    Resp:  19    Temp:  98.1 °F (36.7 °C)    TempSrc:  Temporal    SpO2:  97%    Weight: 161 lb (73 kg)  160 lb (72.6 kg)   Height: 5' 10\" (1.778 m)                                                BP Readings from Last 3 Encounters:   03/01/22 120/80   02/16/22 120/82   12/10/21 138/82       NPO Status: Time of last liquid consumption: 0200                        Time of last solid consumption: 1500                        Date of last liquid consumption: 03/01/22                        Date of last solid food consumption: 02/28/22    BMI:   Wt Readings from Last 3 Encounters:   03/01/22 160 lb (72.6 kg)   02/16/22 161 lb (73 kg)   12/10/21 158 lb (71.7 kg)     Body mass index is 22.96 kg/m².     CBC:   Lab Results   Component Value Date    WBC 9.1 11/15/2021    RBC 4.65 11/15/2021    HGB 14.8 11/15/2021    HCT 44.6 11/15/2021    MCV 95.9 11/15/2021    RDW 13.5 11/15/2021     11/15/2021       CMP:   Lab Results   Component Value Date     11/15/2021    K 5.2 11/15/2021    K 4.0 10/18/2021     11/15/2021    CO2 25 11/15/2021    BUN 9 11/15/2021    CREATININE 0.7 11/15/2021    GFRAA >60 11/15/2021    AGRATIO 1.5 11/15/2021    LABGLOM >60 11/15/2021    GLUCOSE 83 11/15/2021    PROT 7.2 11/15/2021    CALCIUM 10.0 11/15/2021    BILITOT 0.3 11/15/2021    ALKPHOS 104 11/15/2021    AST 21 11/15/2021    ALT 38 11/15/2021       POC Tests: No results for input(s): POCGLU, POCNA, POCK, POCCL, POCBUN, POCHEMO, POCHCT in the last 72 hours. Coags:   Lab Results   Component Value Date    PROTIME 15.6 10/26/2021    INR 1.36 10/26/2021    APTT 32.9 10/26/2021       HCG (If Applicable): No results found for: PREGTESTUR, PREGSERUM, HCG, HCGQUANT     ABGs: No results found for: PHART, PO2ART, BLC5AYD, BKG1QBD, BEART, C2FZBFWK     Type & Screen (If Applicable):  No results found for: LABABO, LABRH    Drug/Infectious Status (If Applicable):  No results found for: HIV, HEPCAB    COVID-19 Screening (If Applicable):   Lab Results   Component Value Date    COVID19 Not Detected 02/24/2022    COVID19 Not Detected 10/18/2021           Anesthesia Evaluation  Patient summary reviewed and Nursing notes reviewed no history of anesthetic complications:   Airway: Mallampati: II  TM distance: >3 FB   Neck ROM: full  Mouth opening: > = 3 FB Dental: normal exam         Pulmonary:normal exam  breath sounds clear to auscultation  (+) current smoker    (-) COPD, asthma and sleep apnea                           Cardiovascular:Negative CV ROS        (-) hypertension, past MI, CAD, CABG/stent, dysrhythmias,  angina and  CHF    ECG reviewed  Rhythm: regular  Rate: normal                    Neuro/Psych:   Negative Neuro/Psych ROS     (-) seizures, TIA and CVA           GI/Hepatic/Renal:   (+) liver disease (steatosis):,      (-) GERD and no renal disease       Endo/Other: Negative Endo/Other ROS       (-) diabetes mellitus, hypothyroidism, hyperthyroidism               Abdominal:             Vascular:           Other Findings:           Anesthesia Plan      general ASA 2       Induction: intravenous. MIPS: Postoperative opioids intended and Prophylactic antiemetics administered. Anesthetic plan and risks discussed with patient. Plan discussed with CRNA.                   Nick Foote MD   3/1/2022

## 2022-03-01 NOTE — H&P
[facility] and Laparoscopic Surgery    I have reviewed the history and physical and examined the patient and find no relevant changes. I have reviewed with the patient and/or family the risks, benefits, and alternatives to the procedure. Joaquin Blackman 6  Zafar Long MD, FACS  3/1/2022  7:09 AM

## 2022-03-01 NOTE — OP NOTE
[address]                     [address] [facility], [address] [facility]                                OPERATIVE REPORT    PATIENT NAME: Ailin Norris                   :        1970  MED REC NO:   [id number]                          ROOM:       4480  ACCOUNT NO:   [de-identified]                           ADMIT DATE: 2022  PROVIDER:     Bryanna Milligan MD    DATE OF PROCEDURE:  2022    PREOPERATIVE DIAGNOSIS:  History of ileostomy. POSTOPERATIVE DIAGNOSIS:  History of ileostomy. OPERATION PERFORMED:  Reversal of loop ileostomy and resection of  anastomosis. SURGEON:  Bryanna Milligan MD    ANESTHESIA:  General.    INDICATIONS:  This is a 81-year-old male who presents with a history of  a sigmoidectomy with diverting loop ileostomy and has recovered and  presents for reversal of loop ileostomy. Risks, benefits, and  alternative treatments of the procedure were discussed. Details of the  procedure were discussed. All questions were answered. The patient  understood, agreed, and wished to proceed. OPERATIVE PROCEDURE:  The patient was brought to the operative suite and  laid in the supine position. General anesthesia was induced and well  tolerated. The patient's abdomen was prepped and draped in the usual  sterile fashion. After a proper time-out was performed verifying  operative site, procedure, and patient, a transverse elliptical incision  was made over the ostomy that had been suture closed with cautery and  then careful blunt dissection in the right angle. The bowel was  dissected circumferentially down through the fascia into the peritoneal  cavity. Loculations and adhesions were broken and the small bowel was  widely free and able to be developed into the wound well. I held  circumferentially under the fascia and no other additional adhesions  noted.   The bowel just proximal and distal to the diverting loop  ileostomy were divided with blue loads of the [place] stapler. The  mesentery was divided with a LigaSure Impact device. The two ends of  the small bowel were aligned with multiple interrupted 3-0 silk sutures. The corner of the staple line was cut and a common anastomosis created  with another blue load of the [place] stapler. The anastomosis was visualized and  was widely patent. The common enterotomy was sealed with a blue load of  the TX60 staggering the staple line to optimize blood flow to the edges. There were some bleeding points at the edge of the staple lines which  indicated good healthy blood flow up to this but had to be controlled  with figure-of-eight silk sutures. At this point, the wound was  hemostatic. The bowel was returned to the abdomen taking care not to  twist the mesentery. The fascia was closed in two layers with PDS  sutures. The wound was irrigated and the skin was then closed loosely  with staples and packed in between with iodoform packing as the patient  is a high risk for wound infection And leaving the wound open would  minimize this. The wound was then cleaned and dressed with gauze and  tape. The patient tolerated the procedure well and was transferred to  the PACU in stable condition. SPECIMEN:  Loop ileostomy. DRAINS:  None. COMPLICATIONS:  None. BLOOD LOSS:  Less than 50 mL.         Niko Domínguez MD    D: 03/01/2022 12:21:58       T: 03/01/2022 13:29:43     [id number]  Job#: [id number]     Doc#: [id number]    CC:

## 2022-03-02 LAB
ANION GAP SERPL CALCULATED.3IONS-SCNC: 10 MMOL/L (ref 3–16)
BASOPHILS ABSOLUTE: 0 K/UL (ref 0–0.2)
BASOPHILS RELATIVE PERCENT: 0.1 %
BUN BLDV-MCNC: 11 MG/DL (ref 7–20)
CALCIUM SERPL-MCNC: 8.2 MG/DL (ref 8.3–10.6)
CHLORIDE BLD-SCNC: 102 MMOL/L (ref 99–110)
CO2: 22 MMOL/L (ref 21–32)
CREAT SERPL-MCNC: 0.8 MG/DL (ref 0.9–1.3)
EOSINOPHILS ABSOLUTE: 0 K/UL (ref 0–0.6)
EOSINOPHILS RELATIVE PERCENT: 0 %
GFR AFRICAN AMERICAN: >60
GFR NON-AFRICAN AMERICAN: >60
GLUCOSE BLD-MCNC: 123 MG/DL (ref 70–99)
HCT VFR BLD CALC: 33.2 % (ref 40.5–52.5)
HEMOGLOBIN: 11.2 G/DL (ref 13.5–17.5)
LYMPHOCYTES ABSOLUTE: 1.5 K/UL (ref 1–5.1)
LYMPHOCYTES RELATIVE PERCENT: 11.3 %
MAGNESIUM: 1.8 MG/DL (ref 1.8–2.4)
MCH RBC QN AUTO: 32.2 PG (ref 26–34)
MCHC RBC AUTO-ENTMCNC: 33.9 G/DL (ref 31–36)
MCV RBC AUTO: 95 FL (ref 80–100)
MONOCYTES ABSOLUTE: 1.1 K/UL (ref 0–1.3)
MONOCYTES RELATIVE PERCENT: 8.5 %
NEUTROPHILS ABSOLUTE: 10.3 K/UL (ref 1.7–7.7)
NEUTROPHILS RELATIVE PERCENT: 80.1 %
PDW BLD-RTO: 13.6 % (ref 12.4–15.4)
PHOSPHORUS: 3.7 MG/DL (ref 2.5–4.9)
PLATELET # BLD: 193 K/UL (ref 135–450)
PMV BLD AUTO: 8.4 FL (ref 5–10.5)
POTASSIUM SERPL-SCNC: 4.4 MMOL/L (ref 3.5–5.1)
RBC # BLD: 3.49 M/UL (ref 4.2–5.9)
SODIUM BLD-SCNC: 134 MMOL/L (ref 136–145)
WBC # BLD: 12.9 K/UL (ref 4–11)

## 2022-03-02 PROCEDURE — 6370000000 HC RX 637 (ALT 250 FOR IP): Performed by: SURGERY

## 2022-03-02 PROCEDURE — 83735 ASSAY OF MAGNESIUM: CPT

## 2022-03-02 PROCEDURE — 99024 POSTOP FOLLOW-UP VISIT: CPT | Performed by: SURGERY

## 2022-03-02 PROCEDURE — 80048 BASIC METABOLIC PNL TOTAL CA: CPT

## 2022-03-02 PROCEDURE — 85025 COMPLETE CBC W/AUTO DIFF WBC: CPT

## 2022-03-02 PROCEDURE — 2580000003 HC RX 258: Performed by: SURGERY

## 2022-03-02 PROCEDURE — 6360000002 HC RX W HCPCS: Performed by: SURGERY

## 2022-03-02 PROCEDURE — 84100 ASSAY OF PHOSPHORUS: CPT

## 2022-03-02 PROCEDURE — 1200000000 HC SEMI PRIVATE

## 2022-03-02 PROCEDURE — APPNB30 APP NON BILLABLE TIME 0-30 MINS: Performed by: NURSE PRACTITIONER

## 2022-03-02 PROCEDURE — APPSS15 APP SPLIT SHARED TIME 0-15 MINUTES: Performed by: NURSE PRACTITIONER

## 2022-03-02 RX ADMIN — ALVIMOPAN 12 MG: 12 CAPSULE ORAL at 20:25

## 2022-03-02 RX ADMIN — DOCUSATE SODIUM 100 MG: 100 CAPSULE, LIQUID FILLED ORAL at 20:25

## 2022-03-02 RX ADMIN — ACETAMINOPHEN 650 MG: 325 TABLET ORAL at 20:24

## 2022-03-02 RX ADMIN — SODIUM CHLORIDE: 9 INJECTION, SOLUTION INTRAVENOUS at 20:23

## 2022-03-02 RX ADMIN — ACETAMINOPHEN 650 MG: 325 TABLET ORAL at 10:25

## 2022-03-02 RX ADMIN — ACETAMINOPHEN 650 MG: 325 TABLET ORAL at 16:41

## 2022-03-02 RX ADMIN — DOCUSATE SODIUM 100 MG: 100 CAPSULE, LIQUID FILLED ORAL at 08:27

## 2022-03-02 RX ADMIN — POLYETHYLENE GLYCOL 3350 17 G: 17 POWDER, FOR SOLUTION ORAL at 08:28

## 2022-03-02 RX ADMIN — OXYCODONE 10 MG: 5 TABLET ORAL at 20:25

## 2022-03-02 RX ADMIN — ALVIMOPAN 12 MG: 12 CAPSULE ORAL at 10:25

## 2022-03-02 RX ADMIN — ENOXAPARIN SODIUM 40 MG: 40 INJECTION SUBCUTANEOUS at 20:25

## 2022-03-02 RX ADMIN — SODIUM CHLORIDE: 9 INJECTION, SOLUTION INTRAVENOUS at 04:09

## 2022-03-02 RX ADMIN — OXYCODONE 10 MG: 5 TABLET ORAL at 12:30

## 2022-03-02 RX ADMIN — Medication 10 ML: at 08:28

## 2022-03-02 ASSESSMENT — PAIN DESCRIPTION - ORIENTATION: ORIENTATION: MID

## 2022-03-02 ASSESSMENT — PAIN DESCRIPTION - FREQUENCY: FREQUENCY: CONTINUOUS

## 2022-03-02 ASSESSMENT — PAIN SCALES - GENERAL
PAINLEVEL_OUTOF10: 2
PAINLEVEL_OUTOF10: 7
PAINLEVEL_OUTOF10: 4
PAINLEVEL_OUTOF10: 8
PAINLEVEL_OUTOF10: 0
PAINLEVEL_OUTOF10: 5
PAINLEVEL_OUTOF10: 3

## 2022-03-02 ASSESSMENT — PAIN DESCRIPTION - ONSET: ONSET: ON-GOING

## 2022-03-02 ASSESSMENT — PAIN DESCRIPTION - DESCRIPTORS: DESCRIPTORS: ACHING;SORE

## 2022-03-02 ASSESSMENT — PAIN DESCRIPTION - PROGRESSION: CLINICAL_PROGRESSION: NOT CHANGED

## 2022-03-02 ASSESSMENT — PAIN DESCRIPTION - PAIN TYPE: TYPE: SURGICAL PAIN

## 2022-03-02 ASSESSMENT — PAIN DESCRIPTION - LOCATION: LOCATION: ABDOMEN

## 2022-03-02 NOTE — PROGRESS NOTES
Call placed on on call surgeon, Dr. Carola Sage of STAT CBC results. Physician not too concerned, expected after surgery. No new orders received. Updated physician that pt's nausea and vomiting improved.

## 2022-03-02 NOTE — PROGRESS NOTES
PM assessment completed. Wife at bedside. Pt resting in bed, denies pain at this time. Pt reports persistent nausea and vomiting despite Zofran at 1800. Pt also noted to be slightly tachycardic at 103, and reports shortness of breath with pain in right upper chest/shoulder. Pt reports intermittent sweats and chills, afebrile at this time. Pt educated on post-op gas pain, encouraged ambulation once nausea subsided. Call placed to general surgeon, Dr. Khurram Sun. New orders received for one time dose IM Phenergan 25 mg, STAT CBC, and to place NG tube to CLWS if nausea and vomiting persist after phenergan. Orders read back and placed. Pt informed of new orders. Pt and wife agreeable.

## 2022-03-02 NOTE — PROGRESS NOTES
[facility]        Progress Note    Patient Name: Adeel Fields  MRN: [id number]  YOB: 1970  Date of Evaluation: 3/2/2022    Subjective:  Nausea and vomiting overnight, better this morning but appetite poor  Pain controlled  No flatus or BM  Resting in bed at this time    Post-Operative Day #1      Vital Signs:  Patient Vitals for the past 24 hrs:   BP Temp Temp src Pulse Resp SpO2   22 1147 (!) 144/84 98.2 °F (36.8 °C) Oral 89 16 95 %   22 0815 (!) 150/92 98.2 °F (36.8 °C) Oral 89 16 95 %   22 0400 (!) 144/97 99.5 °F (37.5 °C) Temporal 89 18 95 %   22 0000 (!) 129/92 97.2 °F (36.2 °C) Temporal 95 18 94 %   22 2000 127/89 97.7 °F (36.5 °C) Temporal 103 17 98 %      TEMPERATURE HISTORY 24H: Temp (24hrs), Av.2 °F (36.8 °C), Min:97.2 °F (36.2 °C), Max:99.5 °F (37.5 °C)    BLOOD PRESSURE HISTORY: Systolic (30UFR), MHO:720 , Min:86 , BBT:276    Diastolic (73FBE), ORM:85, Min:55, Max:114      Intake/Output:  I/O last 3 completed shifts: In: 1000 [I.V.:1000]  Out: 1450 [Urine:1000; Emesis/NG output:350; Blood:100]  I/O this shift:  In: 61 [P.O.:60]  Out: 550 [Urine:550]  Drain/tube Output:       Physical Exam:  General: awake, alert, oriented to  person, place, time  Lungs: unlabored respirations  Abdomen: soft, non-distended, incisional tenderness only, bowel sounds present   Skin/Wound: surgical dressing is in place, old bloody drainage noted    Labs:  CBC:    Recent Labs     22  0630 228 22  0453   WBC 5.6 17.3* 12.9*   HGB 17.1 13.3* 11.2*   HCT 50.4 39.9* 33.2*    241 193     BMP:    Recent Labs     22  0630 22  0452    134*   K 4.3 4.4    102   CO2 19* 22   BUN 9 11   CREATININE 0.7* 0.8*   GLUCOSE 103* 123*     Hepatic:  No results for input(s): AST, ALT, ALB, BILITOT, ALKPHOS in the last 72 hours.   Amylase:  No results found for: AMYLASE  Lipase:    Lab Results Component Value Date    LIPASE 30.0 10/25/2021    LIPASE 43.0 10/18/2021    LIPASE 40.0 10/18/2021      Mag:    Lab Results   Component Value Date    MG 1.80 03/02/2022    MG 2.20 10/28/2021     Phos:     Lab Results   Component Value Date    PHOS 3.7 03/02/2022    PHOS 3.6 10/28/2021      Coags:   Lab Results   Component Value Date    PROTIME 10.6 03/01/2022    INR 0.94 03/01/2022    APTT 35.8 03/01/2022       Cultures:  Anaerobic culture  Lab Results   Component Value Date    LABANAE No anaerobes isolated 10/27/2021     Fungus stain  No results found for requested labs within last 30 days. Gram stain  No results found for requested labs within last 30 days. Organism  No results found for: [facility]  Surgical culture  Lab Results   Component Value Date/Time    CXSURG No growth 60 to 72 hours 10/27/2021 04:10 PM     Blood culture 1  No results found for requested labs within last 30 days. Blood culture 2  No results found for requested labs within last 30 days. Fecal occult  No results found for requested labs within last 30 days. GI bacterial pathogens by PCR  No results found for requested labs within last 30 days. C. difficile  No results found for requested labs within last 30 days. Urine culture  No results found for: LABURIN    Pathology:  OR 3/1/2022--FINAL DIAGNOSIS:     Ileostomy:   - Ileostomy site with mild serosal fibrosis. Imaging:  I have personally reviewed the following films:    No results found.     Scheduled Meds:   sodium chloride flush  5-40 mL IntraVENous 2 times per day    enoxaparin  40 mg SubCUTAneous Q24H    docusate sodium  100 mg Oral BID    polyethylene glycol  17 g Oral Daily    acetaminophen  650 mg Oral Q6H    alvimopan  12 mg Oral BID     Continuous Infusions:   sodium chloride      sodium chloride 125 mL/hr at 03/02/22 0409     PRN Meds:.sodium chloride flush, sodium chloride, promethazine **OR** ondansetron, morphine **OR** morphine, oxyCODONE **OR** oxyCODONE, HYDROmorphone **OR** HYDROmorphone      Assessment:  49 y.o. male admitted     OR Date 3/1/2022, reversal of loop ileostomy and resection of anastomosis      Plan:  1. Had nausea overnight, better now but appetite poor, pain controlled; supportive care  2. Hold at clear liquid diet as tolerated; monitor bowel function, remove Colindres catheter  3. IV hydration until PO intake is adequate; monitor and correct electrolytes  4. Activity as tolerated, ambulate TID, up to chair for all meals  5. Pulmonary toilet, incentive spirometry  6. PRN analgesics and antiemetics--minimizing narcotics as tolerated  7. DVT prophylaxis with Lovenox and SCD's    EDUCATION:  Educated patient on plan of care and disease process--all questions answered. Plans discussed with patient and nursing. Reviewed and discussed with Dr. Shikha Hare. Signed:  LUDA Leonard - CNP  3/2/2022 2:44 PM    I have personally performed a face to face diagnostic evaluation on this patient. I have interviewed and examined the patient and I agree with the assessment above. Time was spent reviewing patient chart (including but not limited to notes, labs, imaging and other testing), interviewing and counseling patient and present family members, performing physical exam, documentation of my findings and subsequent follow up of ordered medication and testing, placing referrals and communication with patient care providers, coordinating future care as well as documentation in the EHR. This encompassed more than 50% of the total encounter time. In summary, my findings and plan are the following:   Mr. Rohan Doyle is a 49 y.o. male who presents with   OR Date 3/1/2022, reversal of loop ileostomy and resection of anastomosis    Plan:  1. Isolated nausea overnight resolved, hold at clear liquids  2. Await return of bowel function, no flatus or stool yet, continue entereg  3. Pain control, transition to PO and minimize narcotics  4.  IVF, monitor and replace electrolytes as needed  5. Increase activity as able  6. Wound care, will advance raymundo tomorrow with dressing change  7. Good urine output, remove carline Smith MD, FACS  3/2/2022  4:33 PM

## 2022-03-03 LAB
ANION GAP SERPL CALCULATED.3IONS-SCNC: 11 MMOL/L (ref 3–16)
BASOPHILS ABSOLUTE: 0 K/UL (ref 0–0.2)
BASOPHILS RELATIVE PERCENT: 0.3 %
BUN BLDV-MCNC: 6 MG/DL (ref 7–20)
CALCIUM SERPL-MCNC: 8.2 MG/DL (ref 8.3–10.6)
CHLORIDE BLD-SCNC: 103 MMOL/L (ref 99–110)
CO2: 21 MMOL/L (ref 21–32)
CREAT SERPL-MCNC: 0.6 MG/DL (ref 0.9–1.3)
EOSINOPHILS ABSOLUTE: 0 K/UL (ref 0–0.6)
EOSINOPHILS RELATIVE PERCENT: 0.2 %
GFR AFRICAN AMERICAN: >60
GFR NON-AFRICAN AMERICAN: >60
GLUCOSE BLD-MCNC: 94 MG/DL (ref 70–99)
HCT VFR BLD CALC: 25.3 % (ref 40.5–52.5)
HEMOGLOBIN: 8.7 G/DL (ref 13.5–17.5)
LYMPHOCYTES ABSOLUTE: 1.2 K/UL (ref 1–5.1)
LYMPHOCYTES RELATIVE PERCENT: 14.1 %
MCH RBC QN AUTO: 33.1 PG (ref 26–34)
MCHC RBC AUTO-ENTMCNC: 34.3 G/DL (ref 31–36)
MCV RBC AUTO: 96.3 FL (ref 80–100)
MONOCYTES ABSOLUTE: 1 K/UL (ref 0–1.3)
MONOCYTES RELATIVE PERCENT: 10.8 %
NEUTROPHILS ABSOLUTE: 6.6 K/UL (ref 1.7–7.7)
NEUTROPHILS RELATIVE PERCENT: 74.6 %
PDW BLD-RTO: 13.8 % (ref 12.4–15.4)
PLATELET # BLD: 174 K/UL (ref 135–450)
PMV BLD AUTO: 9.2 FL (ref 5–10.5)
POTASSIUM SERPL-SCNC: 3.6 MMOL/L (ref 3.5–5.1)
RBC # BLD: 2.62 M/UL (ref 4.2–5.9)
SODIUM BLD-SCNC: 135 MMOL/L (ref 136–145)
WBC # BLD: 8.8 K/UL (ref 4–11)

## 2022-03-03 PROCEDURE — 1200000000 HC SEMI PRIVATE

## 2022-03-03 PROCEDURE — 80048 BASIC METABOLIC PNL TOTAL CA: CPT

## 2022-03-03 PROCEDURE — 6360000002 HC RX W HCPCS: Performed by: SURGERY

## 2022-03-03 PROCEDURE — 94760 N-INVAS EAR/PLS OXIMETRY 1: CPT

## 2022-03-03 PROCEDURE — 99024 POSTOP FOLLOW-UP VISIT: CPT | Performed by: SURGERY

## 2022-03-03 PROCEDURE — 6370000000 HC RX 637 (ALT 250 FOR IP): Performed by: SURGERY

## 2022-03-03 PROCEDURE — 36415 COLL VENOUS BLD VENIPUNCTURE: CPT

## 2022-03-03 PROCEDURE — 85025 COMPLETE CBC W/AUTO DIFF WBC: CPT

## 2022-03-03 PROCEDURE — 2580000003 HC RX 258: Performed by: SURGERY

## 2022-03-03 RX ADMIN — OXYCODONE 10 MG: 5 TABLET ORAL at 02:52

## 2022-03-03 RX ADMIN — ACETAMINOPHEN 650 MG: 325 TABLET ORAL at 09:00

## 2022-03-03 RX ADMIN — OXYCODONE 10 MG: 5 TABLET ORAL at 17:46

## 2022-03-03 RX ADMIN — ENOXAPARIN SODIUM 40 MG: 40 INJECTION SUBCUTANEOUS at 20:28

## 2022-03-03 RX ADMIN — ACETAMINOPHEN 650 MG: 325 TABLET ORAL at 02:51

## 2022-03-03 RX ADMIN — ACETAMINOPHEN 650 MG: 325 TABLET ORAL at 17:46

## 2022-03-03 RX ADMIN — PIPERACILLIN AND TAZOBACTAM 3375 MG: 3; .375 INJECTION, POWDER, LYOPHILIZED, FOR SOLUTION INTRAVENOUS at 09:26

## 2022-03-03 RX ADMIN — ACETAMINOPHEN 650 MG: 325 TABLET ORAL at 23:11

## 2022-03-03 RX ADMIN — DOCUSATE SODIUM 100 MG: 100 CAPSULE, LIQUID FILLED ORAL at 08:24

## 2022-03-03 RX ADMIN — OXYCODONE 10 MG: 5 TABLET ORAL at 23:11

## 2022-03-03 RX ADMIN — SODIUM CHLORIDE: 9 INJECTION, SOLUTION INTRAVENOUS at 02:50

## 2022-03-03 RX ADMIN — POLYETHYLENE GLYCOL 3350 17 G: 17 POWDER, FOR SOLUTION ORAL at 08:24

## 2022-03-03 RX ADMIN — Medication 10 ML: at 08:24

## 2022-03-03 RX ADMIN — PIPERACILLIN AND TAZOBACTAM 3375 MG: 3; .375 INJECTION, POWDER, LYOPHILIZED, FOR SOLUTION INTRAVENOUS at 17:45

## 2022-03-03 RX ADMIN — ALVIMOPAN 12 MG: 12 CAPSULE ORAL at 08:24

## 2022-03-03 ASSESSMENT — PAIN DESCRIPTION - PAIN TYPE
TYPE: SURGICAL PAIN
TYPE: ACUTE PAIN

## 2022-03-03 ASSESSMENT — PAIN SCALES - GENERAL
PAINLEVEL_OUTOF10: 0
PAINLEVEL_OUTOF10: 0
PAINLEVEL_OUTOF10: 1
PAINLEVEL_OUTOF10: 7
PAINLEVEL_OUTOF10: 0

## 2022-03-03 ASSESSMENT — PAIN DESCRIPTION - PROGRESSION: CLINICAL_PROGRESSION: NOT CHANGED

## 2022-03-03 ASSESSMENT — PAIN DESCRIPTION - ONSET: ONSET: ON-GOING

## 2022-03-03 ASSESSMENT — PAIN DESCRIPTION - ORIENTATION: ORIENTATION: MID

## 2022-03-03 ASSESSMENT — PAIN DESCRIPTION - DESCRIPTORS: DESCRIPTORS: SORE;ACHING;TENDER

## 2022-03-03 ASSESSMENT — PAIN DESCRIPTION - LOCATION: LOCATION: ABDOMEN

## 2022-03-03 ASSESSMENT — PAIN DESCRIPTION - FREQUENCY: FREQUENCY: CONTINUOUS

## 2022-03-03 NOTE — PLAN OF CARE
Problem: Pain:  Goal: Pain level will decrease  Description: Pain level will decrease  3/2/2022 2218 by Asif Swann RN  Outcome: Ongoing  3/2/2022 0904 by Corrina Dorsey RN  Outcome: Ongoing  Goal: Control of acute pain  Description: Control of acute pain  3/2/2022 2218 by Asif Swann RN  Outcome: Ongoing  3/2/2022 0904 by Corrina Dorsey RN  Outcome: Ongoing  Goal: Control of chronic pain  Description: Control of chronic pain  3/2/2022 2218 by Asif Swann RN  Outcome: Ongoing  3/2/2022 0904 by Corrina Dorsey RN  Outcome: Ongoing

## 2022-03-03 NOTE — PROGRESS NOTES
PM assessment completed. Pt resting well in bed with c/o moderate pain. PRN oxycodone administered. Pt febrile at this time 100.4, scheduled tylenol administered. Abdominal dressing remains dry and intact, old drainage noted. Pt reports flatus, no BM. Pt tolerating small amounts of clear liquids, denies nausea and vomiting. Pt tolerating activity in room and to restroom, abdominal binder placed for additional abdominal support with activity and coughing. Incentive spirometry use encouraged. No further needs voiced at this time. Fall precautions in place, hourly rounding, call light and belongings in reach, bed in lowest position, wheels locked in place, side rails up x 2, walkways free of clutter.

## 2022-03-03 NOTE — PROGRESS NOTES
[facility]        Progress Note    Patient Name: Adeel Fields  MRN: [id number]  YOB: 1970  Date of Evaluation: 3/3/2022    Subjective:  Low grade fevers overnight  Tolerating liquid diet without nausea  Passing flatus and stool    Post-Operative Day #2    Vital Signs:  Patient Vitals for the past 24 hrs:   BP Temp Temp src Pulse Resp SpO2   22 0815 130/76 100.6 °F (38.1 °C) Temporal 93 16 94 %   22 0630 123/78 98.8 °F (37.1 °C) Temporal 89 16 94 %   22 0008 126/76 100.6 °F (38.1 °C) Temporal 98 16 93 %   22 2200  99.8 °F (37.7 °C) Temporal      22 139/73 100.4 °F (38 °C) Temporal 105 18 94 %   22 1616 (!) 156/84 98 °F (36.7 °C) Oral 88 18 95 %   22 1147 (!) 144/84 98.2 °F (36.8 °C) Oral 89 16 95 %      TEMPERATURE HISTORY 24H: Temp (24hrs), Av.5 °F (37.5 °C), Min:98 °F (36.7 °C), Max:100.6 °F (38.1 °C)    BLOOD PRESSURE HISTORY: Systolic (19GMC), XEB:091 , Min:123 , PZQ:609    Diastolic (45ILT), YUA:91, Min:73, Max:97      Intake/Output:  I/O last 3 completed shifts: In: 700 [P.O.:700]  Out:  [Urine:1700; Emesis/NG output:350]  No intake/output data recorded.   Drain/tube Output:       Physical Exam:  General: awake, alert, oriented to  person, place, time  Lungs: unlabored respirations  Abdomen: soft, non-distended, incisional tenderness only, incision clean dry and intact with areas left open intentionally at time of surgery with raymundo    Labs:  CBC:    Recent Labs     22  2048 22  0453 22  0445   WBC 17.3* 12.9* 8.8   HGB 13.3* 11.2* 8.7*   HCT 39.9* 33.2* 25.3*    193 174     BMP:    Recent Labs     22  0630 22  0452 22  0445    134* 135*   K 4.3 4.4 3.6    102 103   CO2 19* 22 21   BUN 9 11 6*   CREATININE 0.7* 0.8* 0.6*   GLUCOSE 103* 123* 94     Hepatic:  No results for input(s): AST, ALT, ALB, BILITOT, ALKPHOS in the last 72 hours. Amylase:  No results found for: AMYLASE  Lipase:    Lab Results   Component Value Date    LIPASE 30.0 10/25/2021    LIPASE 43.0 10/18/2021    LIPASE 40.0 10/18/2021      Mag:    Lab Results   Component Value Date    MG 1.80 03/02/2022    MG 2.20 10/28/2021     Phos:     Lab Results   Component Value Date    PHOS 3.7 03/02/2022    PHOS 3.6 10/28/2021      Coags:   Lab Results   Component Value Date    PROTIME 10.6 03/01/2022    INR 0.94 03/01/2022    APTT 35.8 03/01/2022       Cultures:  Anaerobic culture  Lab Results   Component Value Date    LABANAE No anaerobes isolated 10/27/2021     Fungus stain  No results found for requested labs within last 30 days. Gram stain  No results found for requested labs within last 30 days. Organism  No results found for: [facility]  Surgical culture  Lab Results   Component Value Date/Time    CXSURG No growth 60 to 72 hours 10/27/2021 04:10 PM     Blood culture 1  No results found for requested labs within last 30 days. Blood culture 2  No results found for requested labs within last 30 days. Fecal occult  No results found for requested labs within last 30 days. GI bacterial pathogens by PCR  No results found for requested labs within last 30 days. C. difficile  No results found for requested labs within last 30 days. Urine culture  No results found for: LABURIN    Pathology:  OR 3/1/2022--FINAL DIAGNOSIS:     Ileostomy:   - Ileostomy site with mild serosal fibrosis. Imaging:  I have personally reviewed the following films:    No results found.     Scheduled Meds:   sodium chloride flush  5-40 mL IntraVENous 2 times per day    enoxaparin  40 mg SubCUTAneous Q24H    docusate sodium  100 mg Oral BID    polyethylene glycol  17 g Oral Daily    acetaminophen  650 mg Oral Q6H    alvimopan  12 mg Oral BID     Continuous Infusions:   sodium chloride      sodium chloride 125 mL/hr at 03/03/22 0250     PRN Meds:.sodium chloride flush, sodium chloride, promethazine **OR** ondansetron, morphine **OR** morphine, oxyCODONE **OR** oxyCODONE, HYDROmorphone **OR** HYDROmorphone      Assessment:  Mr. Radames Rojo is a 49 y.o. male who presents with   OR Date 3/1/2022, reversal of loop ileostomy and resection of anastomosis    Plan:  1. Nausea resolved, advance to full liquids  2. Monitor bowel function, passing stool  3. Antibiotics, monitor fever curve  4. Continue local wound care to incision  5. Increase activity as tolerated, pulmonary toilet, incentive spirometry  6. Monitor and replace electrolytes as neede  7. Pain control, transition to PO and minimize narcotics  8. Discharge planning, anticipate 1-2 days if fevers resolve, tolerates diet    Kirill Nixon MD, FACS  3/3/2022  8:30 AM

## 2022-03-04 VITALS
DIASTOLIC BLOOD PRESSURE: 89 MMHG | TEMPERATURE: 98 F | HEIGHT: 70 IN | BODY MASS INDEX: 22.9 KG/M2 | RESPIRATION RATE: 16 BRPM | WEIGHT: 160 LBS | HEART RATE: 89 BPM | SYSTOLIC BLOOD PRESSURE: 139 MMHG | OXYGEN SATURATION: 97 %

## 2022-03-04 LAB
ANION GAP SERPL CALCULATED.3IONS-SCNC: 9 MMOL/L (ref 3–16)
BASOPHILS ABSOLUTE: 0 K/UL (ref 0–0.2)
BASOPHILS RELATIVE PERCENT: 0.7 %
BUN BLDV-MCNC: 3 MG/DL (ref 7–20)
CALCIUM SERPL-MCNC: 8.3 MG/DL (ref 8.3–10.6)
CHLORIDE BLD-SCNC: 101 MMOL/L (ref 99–110)
CO2: 25 MMOL/L (ref 21–32)
CREAT SERPL-MCNC: 0.6 MG/DL (ref 0.9–1.3)
EOSINOPHILS ABSOLUTE: 0.1 K/UL (ref 0–0.6)
EOSINOPHILS RELATIVE PERCENT: 2.4 %
GFR AFRICAN AMERICAN: >60
GFR NON-AFRICAN AMERICAN: >60
GLUCOSE BLD-MCNC: 97 MG/DL (ref 70–99)
HCT VFR BLD CALC: 24.3 % (ref 40.5–52.5)
HEMOGLOBIN: 8.4 G/DL (ref 13.5–17.5)
LYMPHOCYTES ABSOLUTE: 1.4 K/UL (ref 1–5.1)
LYMPHOCYTES RELATIVE PERCENT: 23.5 %
MCH RBC QN AUTO: 32.9 PG (ref 26–34)
MCHC RBC AUTO-ENTMCNC: 34.4 G/DL (ref 31–36)
MCV RBC AUTO: 95.8 FL (ref 80–100)
MONOCYTES ABSOLUTE: 0.7 K/UL (ref 0–1.3)
MONOCYTES RELATIVE PERCENT: 11.9 %
NEUTROPHILS ABSOLUTE: 3.7 K/UL (ref 1.7–7.7)
NEUTROPHILS RELATIVE PERCENT: 61.5 %
PDW BLD-RTO: 13.7 % (ref 12.4–15.4)
PLATELET # BLD: 182 K/UL (ref 135–450)
PMV BLD AUTO: 8.6 FL (ref 5–10.5)
POTASSIUM SERPL-SCNC: 3.4 MMOL/L (ref 3.5–5.1)
RBC # BLD: 2.54 M/UL (ref 4.2–5.9)
SODIUM BLD-SCNC: 135 MMOL/L (ref 136–145)
WBC # BLD: 6 K/UL (ref 4–11)

## 2022-03-04 PROCEDURE — APPSS30 APP SPLIT SHARED TIME 16-30 MINUTES: Performed by: NURSE PRACTITIONER

## 2022-03-04 PROCEDURE — 6360000002 HC RX W HCPCS: Performed by: SURGERY

## 2022-03-04 PROCEDURE — 85025 COMPLETE CBC W/AUTO DIFF WBC: CPT

## 2022-03-04 PROCEDURE — 80048 BASIC METABOLIC PNL TOTAL CA: CPT

## 2022-03-04 PROCEDURE — APPNB30 APP NON BILLABLE TIME 0-30 MINS: Performed by: NURSE PRACTITIONER

## 2022-03-04 PROCEDURE — 99024 POSTOP FOLLOW-UP VISIT: CPT | Performed by: SURGERY

## 2022-03-04 PROCEDURE — 6370000000 HC RX 637 (ALT 250 FOR IP): Performed by: SURGERY

## 2022-03-04 PROCEDURE — 2580000003 HC RX 258: Performed by: SURGERY

## 2022-03-04 RX ORDER — OXYCODONE HYDROCHLORIDE 5 MG/1
5 TABLET ORAL EVERY 6 HOURS PRN
Qty: 15 TABLET | Refills: 0 | Status: SHIPPED | OUTPATIENT
Start: 2022-03-04 | End: 2022-03-09

## 2022-03-04 RX ORDER — AMOXICILLIN AND CLAVULANATE POTASSIUM 875; 125 MG/1; MG/1
1 TABLET, FILM COATED ORAL 2 TIMES DAILY
Qty: 14 TABLET | Refills: 0 | Status: SHIPPED | OUTPATIENT
Start: 2022-03-04 | End: 2022-03-11

## 2022-03-04 RX ADMIN — PIPERACILLIN AND TAZOBACTAM 3375 MG: 3; .375 INJECTION, POWDER, LYOPHILIZED, FOR SOLUTION INTRAVENOUS at 09:07

## 2022-03-04 RX ADMIN — ACETAMINOPHEN 650 MG: 325 TABLET ORAL at 03:24

## 2022-03-04 RX ADMIN — ACETAMINOPHEN 650 MG: 325 TABLET ORAL at 10:04

## 2022-03-04 RX ADMIN — OXYCODONE 10 MG: 5 TABLET ORAL at 03:33

## 2022-03-04 RX ADMIN — PIPERACILLIN AND TAZOBACTAM 3375 MG: 3; .375 INJECTION, POWDER, LYOPHILIZED, FOR SOLUTION INTRAVENOUS at 01:07

## 2022-03-04 ASSESSMENT — PAIN SCALES - GENERAL
PAINLEVEL_OUTOF10: 7
PAINLEVEL_OUTOF10: 4
PAINLEVEL_OUTOF10: 2
PAINLEVEL_OUTOF10: 0

## 2022-03-04 ASSESSMENT — PAIN DESCRIPTION - ORIENTATION: ORIENTATION: MID

## 2022-03-04 ASSESSMENT — PAIN DESCRIPTION - PROGRESSION: CLINICAL_PROGRESSION: NOT CHANGED

## 2022-03-04 ASSESSMENT — PAIN DESCRIPTION - LOCATION: LOCATION: ABDOMEN

## 2022-03-04 ASSESSMENT — PAIN DESCRIPTION - DESCRIPTORS: DESCRIPTORS: SORE;ACHING

## 2022-03-04 ASSESSMENT — PAIN DESCRIPTION - ONSET: ONSET: ON-GOING

## 2022-03-04 ASSESSMENT — PAIN DESCRIPTION - PAIN TYPE: TYPE: ACUTE PAIN

## 2022-03-04 ASSESSMENT — PAIN DESCRIPTION - FREQUENCY: FREQUENCY: CONTINUOUS

## 2022-03-04 NOTE — DISCHARGE SUMMARY
[place] [address] [facility]        Discharge Summary    Patient Name: Amy Pan  MRN: [id number]  YOB: 1970  PCP: Norah Chappell MD  Admission Date: 3/1/2022  Discharge Date: 3/4/2022  Disposition: home  Admitting Diagnosis: History of creation of ostomy [facility]) [Z93.9]  Discharge Diagnosis:   Patient Active Problem List   Diagnosis    Other male erectile dysfunction    Complex care coordination    Cigarette nicotine dependence without complication    Intra-abdominal abscess ([facility]    Colonic diverticular abscess    Perforated sigmoid colon ([facility]    Hepatic steatosis    History of open sigmoidectomy    Receiving intravenous antibiotic treatment as outpatient    Renal lesion    History of creation of ostomy [facility])      Consultants: None    Procedures/Diagnostic Test(s):  No orders to display       Discharge Condition: good    HOSPITAL COURSE: Latasha Salazar originally presented to the hospital on 3/1/2022  5:51 AM with a history of a sigmoidectomy with diverting loop ileostomy and has recovered and presents for reversal of loop ileostomy and resection of anastomosis. Hospital course was uneventful. Surgical Pathology:  OR 3/1/2022--FINAL DIAGNOSIS:     Ileostomy:   - Ileostomy site with mild serosal fibrosis. At time of discharge, Latasha Salazar was tolerating a regular diet, had active bowel sounds, ambulating on his own accord and had adequate analgesia on oral pain medications, and had no signs of symptoms of complications. PHYSICAL EXAMINATION:  Discharge Vitals:  height is 5' 10\" (1.778 m) and weight is 160 lb (72.6 kg). His oral temperature is 98 °F (36.7 °C). His blood pressure is 139/89 and his pulse is 89. His respiration is 16 and oxygen saturation is 97%.    General appearance - alert, well appearing, and in no distress  Chest - clear to ausculation  Heart - normal rate and regular rhythm  Abdomen - soft, non-distended, incisional tenderness only, bowel sounds present  Neurological - motor and sensory grossly normal bilaterally  Musculoskeletal - full range of motion without pain  Extremities - peripheral pulses normal, no pedal edema, no clubbing or cyanosis  Incision: healing well, no drainage, erythema noted, well approximated with staples--dressing changed    LABS:  Recent Labs     03/01/22 2048 03/02/22 0452 03/02/22  0453 03/03/22  0445 03/04/22  0532 03/04/22  0533   WBC   < >  --  12.9* 8.8  --  6.0   HGB   < >  --  11.2* 8.7*  --  8.4*   HCT   < >  --  33.2* 25.3*  --  24.3*   PLT   < >  --  193 174  --  182   NA  --  134*  --  135* 135*  --    K  --  4.4  --  3.6 3.4*  --    CL  --  102  --  103 101  --    CO2  --  22  --  21 25  --    BUN  --  11  --  6* 3*  --    CREATININE  --  0.8*  --  0.6* 0.6*  --     < > = values in this interval not displayed. DISCHARGE INSTRUCTIONS:  1. Discharge medications:      Medication List      START taking these medications    amoxicillin-clavulanate 875-125 MG per tablet  Commonly known as: AUGMENTIN  Take 1 tablet by mouth 2 times daily for 7 days     oxyCODONE 5 MG immediate release tablet  Commonly known as: Roxicodone  Take 1 tablet by mouth every 6 hours as needed for Pain for up to 5 days. Take lowest dose possible to manage pain; may stop taking sooner than 7 days if pain is able to be controlled with non-narcotic analgesics and therapies. Where to Get Your Medications      You can get these medications from any pharmacy    Bring a paper prescription for each of these medications  · amoxicillin-clavulanate 875-125 MG per tablet  · oxyCODONE 5 MG immediate release tablet       2. Diet as tolerated. 3. Activity: activity as tolerated, no driving while on analgesics and no heavy lifting for 6 weeks. 4. Wound care: keep incisions clean and dry  5. Follow-up: recommend follow up with PCP in 2-4 weeks. 6. Okay to shower, don't submerge incisions under water.   7. No driving until off pain medication and able to move torso freely without any pain. 8. Return to hospital, or contact Dr. Annalee Santana office ([phone]) if any signs of infection are seen. 9. The patient is currently smoking. The risks related to smoking were  reviewed with the patient. Recommend maintaining a smoke-free lifestyle. Products available for smoking cessation were discussed in detail. 10. Return to Clinic: in 2 weeks. 11. Reviewed discharge instructions, restrictions, and reasons to call the office. EDUCATION:  Educated patient on plan of care and disease process--all questions answered. Plans discussed with patient and nursing. Reviewed and discussed with Dr. Rubio Gutierrez. Time spent for discharge: 30 minutes, including plan of care, patient education, and care coordination. Signed:  LUDA Lemus - CNP  3/4/2022 9:28 AM    Marleni Gutierrez MD, FACS  3/4/2022  3:40 PM

## 2022-03-04 NOTE — PROGRESS NOTES
Pt dc to go home will have follow up with surgery in 2 weeks. Dc instructions discussed and no questions at this time. Pt given paper prescriptions to take to pharmacy. VSS. PIV removed. Taken to car via wheel chair.

## 2022-03-04 NOTE — PLAN OF CARE
Problem: Pain:  Goal: Pain level will decrease  Description: Pain level will decrease  3/4/2022 0937 by Erica Dunham RN  Outcome: Completed  3/4/2022 0937 by Erica Dunham RN  Outcome: Ongoing  3/4/2022 0416 by Goldie Dallas RN  Outcome: Ongoing  Goal: Control of acute pain  Description: Control of acute pain  3/4/2022 0937 by Erica Dunham RN  Outcome: Completed  3/4/2022 0937 by Erica Dunham RN  Outcome: Ongoing  3/4/2022 0416 by Goldie Dallas RN  Outcome: Ongoing  Goal: Control of chronic pain  Description: Control of chronic pain  3/4/2022 0937 by Erica Dunham RN  Outcome: Completed  3/4/2022 0937 by Erica Dunham RN  Outcome: Ongoing  3/4/2022 0416 by Goldie Dallas RN  Outcome: Ongoing

## 2022-03-04 NOTE — PROGRESS NOTES
PM assessment completed. Pt resting well in bed with n/o pain or discomfort at this time. Pt tolerating small amounts of full liquid diet, admits he isn't fond of the soups and cannot eat puddings and ice cream. No N/V. Pt hoping for diet advancement tomorrow. Pt reports continued flatus and BMs today. Ambulating well. No further needs voiced. Fall precautions in place, hourly rounding, call light and belongings in reach, bed in lowest position, wheels locked in place, side rails up x 2, walkways free of clutter.

## 2022-03-04 NOTE — PLAN OF CARE
Problem: Pain:  Goal: Pain level will decrease  Description: Pain level will decrease  3/4/2022 0937 by Marcus Dominguez RN  Outcome: Ongoing  3/4/2022 0416 by Jamie Amezcua RN  Outcome: Ongoing  Goal: Control of acute pain  Description: Control of acute pain  3/4/2022 0937 by Marcus Dominguez RN  Outcome: Ongoing  3/4/2022 0416 by Jamie Amezcua RN  Outcome: Ongoing  Goal: Control of chronic pain  Description: Control of chronic pain  3/4/2022 0937 by Marcus Dominguez RN  Outcome: Ongoing  3/4/2022 0416 by Jamie Amezcua RN  Outcome: Ongoing

## 2022-03-08 ENCOUNTER — OFFICE VISIT (OUTPATIENT)
Dept: SURGERY | Age: 52
End: 2022-03-08

## 2022-03-08 VITALS — DIASTOLIC BLOOD PRESSURE: 82 MMHG | BODY MASS INDEX: 22.96 KG/M2 | WEIGHT: 160 LBS | SYSTOLIC BLOOD PRESSURE: 138 MMHG

## 2022-03-08 DIAGNOSIS — R31.0 GROSS HEMATURIA: Primary | ICD-10-CM

## 2022-03-08 PROCEDURE — 99024 POSTOP FOLLOW-UP VISIT: CPT | Performed by: SURGERY

## 2022-03-08 NOTE — PATIENT INSTRUCTIONS
1.  Continue local wound care with dry dressing  2. May bathe normally, avoid scrubbing over incision  3. Continue antibiotics  4. Monitor hematuria, will refer to urology, likely absorbing hematoma possibly from catheter trauma  5. No heavy lifting over 20lbs for 6 weeks total postop  6.  Follow up with general surgery office in 1 week

## 2022-03-08 NOTE — PROGRESS NOTES
[facility]  SUBJECTIVE:    David Youssef   1970   49 y.o. male presents for routine postoperative followup after reversal of loop ileostomy with small bowel resection and anastomosis on March 1, 2022 approximately 1 week ago. The patient was discharged several days later in good condition. Over the last 2 days the patient has noticed hematuria which is new with mild associated dysuria. He is still on antibiotics and has noticed small drainage at his incision site. Denies fevers chills. Admits to gas pains but is passing stool but has decreased appetite    Past Medical History:   Diagnosis Date    Renal lesion 11/15/2021     Past Surgical History:   Procedure Laterality Date    COLOSTOMY N/A 10/27/2021    EXPLORATORY LAPAROTOMY, SIGMOID RESECTION, ILEOSTOMY.  performed by Sheryle Rebel, MD at [facility] N/A 3/1/2022    OPEN REVERSAL OF LOOP ILEOSTOMY performed by Sheryle Rebel, MD at [address] [place] [address] History     Socioeconomic History    Marital status:      Spouse name: Not on file    Number of children: Not on file    Years of education: Not on file    Highest education level: Not on file   Occupational History    Occupation: [email or web address]   Tobacco Use    Smoking status: Current Every Day Smoker     Types: Cigarettes    Smokeless tobacco: Never Used   Vaping Use    Vaping Use: Never used   Substance and Sexual Activity    Alcohol use: Yes     Comment: Daily    Drug use: Yes     Types: Marijuana Johnston Sangita)    Sexual activity: Yes     Partners: Female   Other Topics Concern    Not on file   Social History Narrative    Not on file     Social Determinants of Health     Financial Resource Strain:     Difficulty of Paying Living Expenses: Not on file   Food Insecurity:     Worried About Running Out of Food in the Last Year: Not on file    Sherice of Food in the Last Year: Not on ALYSHA Snyder Needs:     Lack of Transportation (Medical): Not on file    Lack of Transportation (Non-Medical): Not on file   Physical Activity:     Days of Exercise per Week: Not on file    Minutes of Exercise per Session: Not on file   Stress:     Feeling of Stress : Not on file   Social Connections:     Frequency of Communication with Friends and Family: Not on file    Frequency of Social Gatherings with Friends and Family: Not on file    Attends Sikh Services: Not on file    Active Member of [address] [place] [address] or Organizations: Not on file    Attends Club or Organization Meetings: Not on file    Marital Status: Not on file   Intimate Partner Violence:     Fear of Current or Ex-Partner: Not on file    Emotionally Abused: Not on file    Physically Abused: Not on file    Sexually Abused: Not on file   Housing Stability:     Unable to Pay for Housing in the Last Year: Not on file    Number of [place] in the Last Year: Not on file    Unstable Housing in the Last Year: Not on file      Family History   Problem Relation Age of Onset    No Known Problems Mother     No Known Problems Father      Current Outpatient Medications   Medication Sig Dispense Refill    amoxicillin-clavulanate (AUGMENTIN) 875-125 MG per tablet Take 1 tablet by mouth 2 times daily for 7 days 14 tablet 0    oxyCODONE (ROXICODONE) 5 MG immediate release tablet Take 1 tablet by mouth every 6 hours as needed for Pain for up to 5 days. Take lowest dose possible to manage pain; may stop taking sooner than 7 days if pain is able to be controlled with non-narcotic analgesics and therapies. 15 tablet 0     No current facility-administered medications for this visit.       No Known Allergies     Review of Systems:  Review of systems performed and negative with the exception of the above findings    OBJECTIVE:  /82   Wt 160 lb (72.6 kg)   BMI 22.96 kg/m²      Physical Exam:  General appearance: alert, appears stated age, cooperative and no distress  Abdomen: Soft, nondistended, appropriate incisional tenderness, several staples removed and wound probed with only small amount of drainage and no cellulitis    Admission on 03/01/2022, Discharged on 03/04/2022   Component Date Value Ref Range Status    WBC 03/01/2022 5.6  4.0 - 11.0 K/uL Final    RBC 03/01/2022 5.32  4.20 - 5.90 M/uL Final    Hemoglobin 03/01/2022 17.1  13.5 - 17.5 g/dL Final    Hematocrit 03/01/2022 50.4  40.5 - 52.5 % Final    MCV 03/01/2022 94.7  80.0 - 100.0 fL Final    MCH 03/01/2022 32.1  26.0 - 34.0 pg Final    MCHC 03/01/2022 33.9  31.0 - 36.0 g/dL Final    RDW 03/01/2022 14.3  12.4 - 15.4 % Final    Platelets 07/70/5765 214  135 - 450 K/uL Final    MPV 03/01/2022 8.5  5.0 - 10.5 fL Final    aPTT 03/01/2022 35.8  26.2 - 38.6 sec Final    Comment: Therapeutic range: 60.0 - 90.0 sec    Effective 6-30-21 9:00am EST  Please note reference ranges have  changed for PTT Testing.  Protime 03/01/2022 10.6  9.9 - 12.7 sec Final    Comment: Effective 6-30-21 09:00am EST  Please note reference ranges have  changed for PT and INR Testing.  INR 03/01/2022 0.94  0.88 - 1.12 Final    Comment: Effective 6/30/21 at 09:00am EST    Normal: 0.88 - 1.12  Therapeutic: 2.0 - 3.0  Pros. Valve: 2.5 - 3.5  AMI: 2.0 - 3.0      Sodium 03/01/2022 136  136 - 145 mmol/L Final    Potassium 03/01/2022 4.3  3.5 - 5.1 mmol/L Final    Chloride 03/01/2022 106  99 - 110 mmol/L Final    CO2 03/01/2022 19* 21 - 32 mmol/L Final    Anion Gap 03/01/2022 11  3 - 16 Final    Glucose 03/01/2022 103* 70 - 99 mg/dL Final    BUN 03/01/2022 9  7 - 20 mg/dL Final    CREATININE 03/01/2022 0.7* 0.9 - 1.3 mg/dL Final    GFR Non- 03/01/2022 >60  >60 Final    Comment: >60 mL/min/1.73m2 EGFR, calc. for ages 25 and older using the  MDRD formula (not corrected for weight), is valid for stable  renal function.       GFR  03/01/2022 >60  >60 Final    Comment: Chronic Kidney Disease: less than 60 ml/min/1.73 sq.m. Kidney Failure: less than 15 ml/min/1.73 sq.m. Results valid for patients 18 years and older.  Calcium 03/01/2022 9.7  8.3 - 10.6 mg/dL Final    ABO/Rh 03/01/2022 B POS   Final    Antibody Screen 03/01/2022 NEG   Final    Sodium 03/02/2022 134* 136 - 145 mmol/L Final    Potassium 03/02/2022 4.4  3.5 - 5.1 mmol/L Final    Chloride 03/02/2022 102  99 - 110 mmol/L Final    CO2 03/02/2022 22  21 - 32 mmol/L Final    Anion Gap 03/02/2022 10  3 - 16 Final    Glucose 03/02/2022 123* 70 - 99 mg/dL Final    BUN 03/02/2022 11  7 - 20 mg/dL Final    CREATININE 03/02/2022 0.8* 0.9 - 1.3 mg/dL Final    GFR Non- 03/02/2022 >60  >60 Final    Comment: >60 mL/min/1.73m2 EGFR, calc. for ages 25 and older using the  MDRD formula (not corrected for weight), is valid for stable  renal function.  GFR  03/02/2022 >60  >60 Final    Comment: Chronic Kidney Disease: less than 60 ml/min/1.73 sq.m. Kidney Failure: less than 15 ml/min/1.73 sq.m. Results valid for patients 18 years and older.       Calcium 03/02/2022 8.2* 8.3 - 10.6 mg/dL Final    WBC 03/02/2022 12.9* 4.0 - 11.0 K/uL Final    RBC 03/02/2022 3.49* 4.20 - 5.90 M/uL Final    Hemoglobin 03/02/2022 11.2* 13.5 - 17.5 g/dL Final    Hematocrit 03/02/2022 33.2* 40.5 - 52.5 % Final    MCV 03/02/2022 95.0  80.0 - 100.0 fL Final    MCH 03/02/2022 32.2  26.0 - 34.0 pg Final    MCHC 03/02/2022 33.9  31.0 - 36.0 g/dL Final    RDW 03/02/2022 13.6  12.4 - 15.4 % Final    Platelets 80/89/6468 193  135 - 450 K/uL Final    MPV 03/02/2022 8.4  5.0 - 10.5 fL Final    Neutrophils % 03/02/2022 80.1  % Final    Lymphocytes % 03/02/2022 11.3  % Final    Monocytes % 03/02/2022 8.5  % Final    Eosinophils % 03/02/2022 0.0  % Final    Basophils % 03/02/2022 0.1  % Final    Neutrophils Absolute 03/02/2022 10.3* 1.7 - 7.7 K/uL Final    Lymphocytes Absolute 03/02/2022 1.5  1.0 - 5.1 K/uL Final    Monocytes Absolute 03/02/2022 1.1  0.0 - 1.3 K/uL Final    Eosinophils Absolute 03/02/2022 0.0  0.0 - 0.6 K/uL Final    Basophils Absolute 03/02/2022 0.0  0.0 - 0.2 K/uL Final    Phosphorus 03/02/2022 3.7  2.5 - 4.9 mg/dL Final    Magnesium 03/02/2022 1.80  1.80 - 2.40 mg/dL Final    WBC 03/01/2022 17.3* 4.0 - 11.0 K/uL Final    RBC 03/01/2022 4.18* 4.20 - 5.90 M/uL Final    Hemoglobin 03/01/2022 13.3* 13.5 - 17.5 g/dL Final    Hematocrit 03/01/2022 39.9* 40.5 - 52.5 % Final    MCV 03/01/2022 95.5  80.0 - 100.0 fL Final    MCH 03/01/2022 31.7  26.0 - 34.0 pg Final    MCHC 03/01/2022 33.2  31.0 - 36.0 g/dL Final    RDW 03/01/2022 13.9  12.4 - 15.4 % Final    Platelets 75/97/2091 241  135 - 450 K/uL Final    MPV 03/01/2022 8.3  5.0 - 10.5 fL Final    Sodium 03/03/2022 135* 136 - 145 mmol/L Final    Potassium 03/03/2022 3.6  3.5 - 5.1 mmol/L Final    Chloride 03/03/2022 103  99 - 110 mmol/L Final    CO2 03/03/2022 21  21 - 32 mmol/L Final    Anion Gap 03/03/2022 11  3 - 16 Final    Glucose 03/03/2022 94  70 - 99 mg/dL Final    BUN 03/03/2022 6* 7 - 20 mg/dL Final    CREATININE 03/03/2022 0.6* 0.9 - 1.3 mg/dL Final    GFR Non- 03/03/2022 >60  >60 Final    Comment: >60 mL/min/1.73m2 EGFR, calc. for ages 25 and older using the  MDRD formula (not corrected for weight), is valid for stable  renal function.  GFR  03/03/2022 >60  >60 Final    Comment: Chronic Kidney Disease: less than 60 ml/min/1.73 sq.m. Kidney Failure: less than 15 ml/min/1.73 sq.m. Results valid for patients 18 years and older.       Calcium 03/03/2022 8.2* 8.3 - 10.6 mg/dL Final    WBC 03/03/2022 8.8  4.0 - 11.0 K/uL Final    RBC 03/03/2022 2.62* 4.20 - 5.90 M/uL Final    Hemoglobin 03/03/2022 8.7* 13.5 - 17.5 g/dL Final    Hematocrit 03/03/2022 25.3* 40.5 - 52.5 % Final    MCV 03/03/2022 96.3  80.0 - 100.0 fL Final    MCH 03/03/2022 33.1  26.0 - 34.0 pg Final    MCHC 03/03/2022 34.3  31.0 - 36.0 g/dL Final    RDW 03/03/2022 13.8  12.4 - 15.4 % Final    Platelets 59/58/5560 174  135 - 450 K/uL Final    MPV 03/03/2022 9.2  5.0 - 10.5 fL Final    Neutrophils % 03/03/2022 74.6  % Final    Lymphocytes % 03/03/2022 14.1  % Final    Monocytes % 03/03/2022 10.8  % Final    Eosinophils % 03/03/2022 0.2  % Final    Basophils % 03/03/2022 0.3  % Final    Neutrophils Absolute 03/03/2022 6.6  1.7 - 7.7 K/uL Final    Lymphocytes Absolute 03/03/2022 1.2  1.0 - 5.1 K/uL Final    Monocytes Absolute 03/03/2022 1.0  0.0 - 1.3 K/uL Final    Eosinophils Absolute 03/03/2022 0.0  0.0 - 0.6 K/uL Final    Basophils Absolute 03/03/2022 0.0  0.0 - 0.2 K/uL Final    Sodium 03/04/2022 135* 136 - 145 mmol/L Final    Potassium 03/04/2022 3.4* 3.5 - 5.1 mmol/L Final    Chloride 03/04/2022 101  99 - 110 mmol/L Final    CO2 03/04/2022 25  21 - 32 mmol/L Final    Anion Gap 03/04/2022 9  3 - 16 Final    Glucose 03/04/2022 97  70 - 99 mg/dL Final    BUN 03/04/2022 3* 7 - 20 mg/dL Final    CREATININE 03/04/2022 0.6* 0.9 - 1.3 mg/dL Final    GFR Non- 03/04/2022 >60  >60 Final    Comment: >60 mL/min/1.73m2 EGFR, calc. for ages 25 and older using the  MDRD formula (not corrected for weight), is valid for stable  renal function.  GFR  03/04/2022 >60  >60 Final    Comment: Chronic Kidney Disease: less than 60 ml/min/1.73 sq.m. Kidney Failure: less than 15 ml/min/1.73 sq.m. Results valid for patients 18 years and older.       Calcium 03/04/2022 8.3  8.3 - 10.6 mg/dL Final    WBC 03/04/2022 6.0  4.0 - 11.0 K/uL Final    RBC 03/04/2022 2.54* 4.20 - 5.90 M/uL Final    Hemoglobin 03/04/2022 8.4* 13.5 - 17.5 g/dL Final    Hematocrit 03/04/2022 24.3* 40.5 - 52.5 % Final    MCV 03/04/2022 95.8  80.0 - 100.0 fL Final    MCH 03/04/2022 32.9  26.0 - 34.0 pg Final    MCHC 03/04/2022 34.4  31.0 - 36.0 g/dL Final    RDW 03/04/2022 13.7  12.4 - 15.4 % Final    Platelets 69/19/3122 182  135 - 450 K/uL Final    MPV 03/04/2022 8.6  5.0 - 10.5 fL Final    Neutrophils % 03/04/2022 61.5  % Final    Lymphocytes % 03/04/2022 23.5  % Final    Monocytes % 03/04/2022 11.9  % Final    Eosinophils % 03/04/2022 2.4  % Final    Basophils % 03/04/2022 0.7  % Final    Neutrophils Absolute 03/04/2022 3.7  1.7 - 7.7 K/uL Final    Lymphocytes Absolute 03/04/2022 1.4  1.0 - 5.1 K/uL Final    Monocytes Absolute 03/04/2022 0.7  0.0 - 1.3 K/uL Final    Eosinophils Absolute 03/04/2022 0.1  0.0 - 0.6 K/uL Final    Basophils Absolute 03/04/2022 0.0  0.0 - 0.2 K/uL Final   Orders Only on 02/24/2022   Component Date Value Ref Range Status    SARS-CoV-2 02/24/2022 Not Detected  Not detected Final    Comment: This test has been authorized by FDA under an  Emergency Use Authorization (EUA). This test is only authorized for the duration of the  time of declaration that circumstances exist justifying the  authorization of the emergency use of in vitro diagnostic  testing for detection of the SARS-CoV-2 virus  and/or diagnosis of COVID-19 infection under  section 564 (b)(1) of the Act, 21 U. S.C. 140IZA-7 (b) (1),  unless the authorization is terminated or revoked sooner. Patient Fact [email or web address]  Provider Fact [email or web address]    METHODOLOGY: RT PCR         FL BARIUM ENEMA    Result Date: 2/9/2022  EXAMINATION: SINGLE CONTRAST BARIUM ENEMA  2/9/2022 TECHNIQUE: Barium enema was performed with thin barium contrast. FLUOROSCOPY DOSE AND TYPE OR TIME AND EXPOSURES: 42 seconds fluoroscopy, 18 fluoroscopic images COMPARISON: CT abdomen pelvis dated 10/27/2021 HISTORY: ORDERING SYSTEM PROVIDED HISTORY: Perforated sigmoid colon ([facility] [address] TECHNOLOGIST PROVIDED HISTORY: Reason for Exam: Dx: Perforated sigmoid colon ([facility] [address] K63.1 (ICD-10-CM);  History of open sigmoidectomy Z98.890, Z90.49 (ICD-10-CM) FINDINGS: With patient in the decubitus position, technologist inserted rectal tube and inflated balloon. Barium was infused retrograde into the colon. There is prompt opacification of the colon throughout its length, with reflux of contrast to the terminal ileum. Diverticulosis is seen of the sigmoid. Redundant bowel is seen within the pelvis, without definite ectopic collection on the post evacuation image. Mild residual contrast is seen within the colon after evacuation. Contrast is seen within right lower quadrant ostomy bag. Sigmoid diverticulosis. No evidence of functional obstruction or definite leak. Assessment:  OR Date 3/1/2022, reversal of loop ileostomy and resection of anastomosis    Plan:  1. Continue local wound care with dry dressing  2. May bathe normally, avoid scrubbing over incision  3. Continue antibiotics  4. Monitor hematuria, will refer to urology, likely absorbing hematoma possibly from catheter trauma  5. No heavy lifting over 20lbs for 6 weeks total postop  6. Follow up with general surgery office in 1 week    Kirill Valdez MD, FACS  3/8/2022  4:56 PM

## 2022-03-15 ENCOUNTER — OFFICE VISIT (OUTPATIENT)
Dept: SURGERY | Age: 52
End: 2022-03-15

## 2022-03-15 VITALS — WEIGHT: 159 LBS | SYSTOLIC BLOOD PRESSURE: 118 MMHG | DIASTOLIC BLOOD PRESSURE: 68 MMHG | BODY MASS INDEX: 22.81 KG/M2

## 2022-03-15 DIAGNOSIS — Z09 SURGERY FOLLOW-UP: Primary | ICD-10-CM

## 2022-03-15 PROCEDURE — 99024 POSTOP FOLLOW-UP VISIT: CPT | Performed by: SURGERY

## 2022-03-15 NOTE — PATIENT INSTRUCTIONS
1. Continue local wound care with dry dressing  2. May bathe normally, avoid scrubbing over incision  3. Continue antibiotics  4. No heavy lifting over 20lbs for 6 weeks total postop  5.  Follow up with general surgery office as needed

## 2022-03-15 NOTE — PROGRESS NOTES
[address] [facility]  SUBJECTIVE:    Sheri Reddy   1970   47 y.o. 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 Incisional pain minimal.  Wound with less drainage. Improved from last visit last week    Past Medical History:   Diagnosis Date    Renal lesion 11/15/2021     Past Surgical History:   Procedure Laterality Date    COLOSTOMY N/A 10/27/2021    EXPLORATORY LAPAROTOMY, SIGMOID RESECTION, ILEOSTOMY. performed by Kunal Don MD at [facility] N/A 3/1/2022    OPEN REVERSAL OF LOOP ILEOSTOMY performed by Kunal Don MD at [address] [place] [address] Road History     Socioeconomic History    Marital status:      Spouse name: Not on file    Number of children: Not on file    Years of education: Not on file    Highest education level: Not on file   Occupational History    Occupation: [employer]   Tobacco Use    Smoking status: Current Every Day Smoker     Types: Cigarettes    Smokeless tobacco: Never Used   Vaping Use    Vaping Use: Never used   Substance and Sexual Activity    Alcohol use: Yes     Comment: Daily    Drug use: Yes     Types: Marijuana [place])    Sexual activity: Yes     Partners: Female   Other Topics Concern    Not on file   Social History Narrative    Not on file     Social Determinants of Health     Financial Resource Strain:     Difficulty of Paying Living Expenses: Not on file   Food Insecurity:     Worried About [address] in the Last Year: Not on file    [address] in the Last Year: Not on file   Transportation Needs:     Lack of Transportation (Medical): Not on file    Lack of Transportation (Non-Medical):  Not on file   Physical Activity:     Days of Exercise per Week: Not on file    Minutes of Exercise per Session: Not on file   Stress:     Feeling of Stress : Not on file Social Connections:     Frequency of Communication with Friends and Family: Not on file    Frequency of Social Gatherings with Friends and Family: Not on file    Attends [employer] Services: Not on file    Active Member of Clubs or Organizations: Not on file    Attends Club or Organization Meetings: Not on file    Marital Status: Not on file   Intimate Partner Violence:     Fear of Current or Ex-Partner: Not on file    Emotionally Abused: Not on file    Physically Abused: Not on file    Sexually Abused: Not on file   Housing Stability:     Unable to Pay for Housing in the Last Year: Not on file    Number of [place] in the Last Year: Not on file    Unstable Housing in the Last Year: Not on file      Family History   Problem Relation Age of Onset    No Known Problems Mother     No Known Problems Father      No current outpatient medications on file. No current facility-administered medications for this visit.       No Known Allergies     Review of Systems:  Review of systems performed and negative with the exception of the above findings    OBJECTIVE:  /68   Wt 159 lb (72.1 kg)   BMI 22.81 kg/m²      Physical Exam:  General appearance: alert, appears stated age, cooperative and no distress  Abdomen: soft, non-distended, appropriate incisional tenderness, incisions clean dry and intact in between 2 areas intentionally left open, no cellulitis and minimal if any drainage, much improved exam compared to last week    Admission on 03/01/2022, Discharged on 03/04/2022   Component Date Value Ref Range Status    WBC 03/01/2022 5.6  4.0 - 11.0 K/uL Final    RBC 03/01/2022 5.32  4.20 - 5.90 M/uL Final    Hemoglobin 03/01/2022 17.1  13.5 - 17.5 g/dL Final    Hematocrit 03/01/2022 50.4  40.5 - 52.5 % Final    MCV 03/01/2022 94.7  80.0 - 100.0 fL Final    MCH 03/01/2022 32.1  26.0 - 34.0 pg Final    MCHC 03/01/2022 33.9  31.0 - 36.0 g/dL Final    RDW 03/01/2022 14.3  12.4 - 15.4 % Final    Platelets 27/85/5613 214  135 - 450 K/uL Final    MPV 03/01/2022 8.5  5.0 - 10.5 fL Final    aPTT 03/01/2022 35.8  26.2 - 38.6 sec Final    Comment: Therapeutic range: 60.0 - 90.0 sec    Effective 6-30-21 9:00am EST  Please note reference ranges have  changed for PTT Testing.  Protime 03/01/2022 10.6  9.9 - 12.7 sec Final    Comment: Effective 6-30-21 09:00am EST  Please note reference ranges have  changed for PT and INR Testing.  INR 03/01/2022 0.94  0.88 - 1.12 Final    Comment: Effective 6/30/21 at 09:00am EST    Normal: 0.88 - 1.12  Therapeutic: 2.0 - 3.0  Pros. Valve: 2.5 - 3.5  AMI: 2.0 - 3.0      Sodium 03/01/2022 136  136 - 145 mmol/L Final    Potassium 03/01/2022 4.3  3.5 - 5.1 mmol/L Final    Chloride 03/01/2022 106  99 - 110 mmol/L Final    CO2 03/01/2022 19* 21 - 32 mmol/L Final    Anion Gap 03/01/2022 11  3 - 16 Final    Glucose 03/01/2022 103* 70 - 99 mg/dL Final    BUN 03/01/2022 9  7 - 20 mg/dL Final    CREATININE 03/01/2022 0.7* 0.9 - 1.3 mg/dL Final    GFR Non- 03/01/2022 >60  >60 Final    Comment: >60 mL/min/1.73m2 EGFR, calc. for ages 25 and older using the  MDRD formula (not corrected for weight), is valid for stable  renal function.  GFR  03/01/2022 >60  >60 Final    Comment: Chronic Kidney Disease: less than 60 ml/min/1.73 sq.m. Kidney Failure: less than 15 ml/min/1.73 sq.m. Results valid for patients 18 years and older.       Calcium 03/01/2022 9.7  8.3 - 10.6 mg/dL Final    ABO/Rh 03/01/2022 B POS   Final    Antibody Screen 03/01/2022 NEG   Final    Sodium 03/02/2022 134* 136 - 145 mmol/L Final    Potassium 03/02/2022 4.4  3.5 - 5.1 mmol/L Final    Chloride 03/02/2022 102  99 - 110 mmol/L Final    CO2 03/02/2022 22  21 - 32 mmol/L Final    Anion Gap 03/02/2022 10  3 - 16 Final    Glucose 03/02/2022 123* 70 - 99 mg/dL Final    BUN 03/02/2022 11  7 - 20 mg/dL Final    CREATININE 03/02/2022 0.8* 0.9 - 1.3 mg/dL Final  GFR Non- 03/02/2022 >60  >60 Final    Comment: >60 mL/min/1.73m2 EGFR, calc. for ages 25 and older using the  MDRD formula (not corrected for weight), is valid for stable  renal function.  GFR  03/02/2022 >60  >60 Final    Comment: Chronic Kidney Disease: less than 60 ml/min/1.73 sq.m. Kidney Failure: less than 15 ml/min/1.73 sq.m. Results valid for patients 18 years and older.       Calcium 03/02/2022 8.2* 8.3 - 10.6 mg/dL Final    WBC 03/02/2022 12.9* 4.0 - 11.0 K/uL Final    RBC 03/02/2022 3.49* 4.20 - 5.90 M/uL Final    Hemoglobin 03/02/2022 11.2* 13.5 - 17.5 g/dL Final    Hematocrit 03/02/2022 33.2* 40.5 - 52.5 % Final    MCV 03/02/2022 95.0  80.0 - 100.0 fL Final    MCH 03/02/2022 32.2  26.0 - 34.0 pg Final    MCHC 03/02/2022 33.9  31.0 - 36.0 g/dL Final    RDW 03/02/2022 13.6  12.4 - 15.4 % Final    Platelets 07/11/8110 193  135 - 450 K/uL Final    MPV 03/02/2022 8.4  5.0 - 10.5 fL Final    Neutrophils % 03/02/2022 80.1  % Final    Lymphocytes % 03/02/2022 11.3  % Final    Monocytes % 03/02/2022 8.5  % Final    Eosinophils % 03/02/2022 0.0  % Final    Basophils % 03/02/2022 0.1  % Final    Neutrophils Absolute 03/02/2022 10.3* 1.7 - 7.7 K/uL Final    Lymphocytes Absolute 03/02/2022 1.5  1.0 - 5.1 K/uL Final    Monocytes Absolute 03/02/2022 1.1  0.0 - 1.3 K/uL Final    Eosinophils Absolute 03/02/2022 0.0  0.0 - 0.6 K/uL Final    Basophils Absolute 03/02/2022 0.0  0.0 - 0.2 K/uL Final    Phosphorus 03/02/2022 3.7  2.5 - 4.9 mg/dL Final    Magnesium 03/02/2022 1.80  1.80 - 2.40 mg/dL Final    WBC 03/01/2022 17.3* 4.0 - 11.0 K/uL Final    RBC 03/01/2022 4.18* 4.20 - 5.90 M/uL Final    Hemoglobin 03/01/2022 13.3* 13.5 - 17.5 g/dL Final    Hematocrit 03/01/2022 39.9* 40.5 - 52.5 % Final    MCV 03/01/2022 95.5  80.0 - 100.0 fL Final    MCH 03/01/2022 31.7  26.0 - 34.0 pg Final    MCHC 03/01/2022 33.2  31.0 - 36.0 g/dL Final    RDW 03/01/2022 13.9  12.4 - 15.4 % Final    Platelets 07/69/6579 241  135 - 450 K/uL Final    MPV 03/01/2022 8.3  5.0 - 10.5 fL Final    Sodium 03/03/2022 135* 136 - 145 mmol/L Final    Potassium 03/03/2022 3.6  3.5 - 5.1 mmol/L Final    Chloride 03/03/2022 103  99 - 110 mmol/L Final    CO2 03/03/2022 21  21 - 32 mmol/L Final    Anion Gap 03/03/2022 11  3 - 16 Final    Glucose 03/03/2022 94  70 - 99 mg/dL Final    BUN 03/03/2022 6* 7 - 20 mg/dL Final    CREATININE 03/03/2022 0.6* 0.9 - 1.3 mg/dL Final    GFR Non- 03/03/2022 >60  >60 Final    Comment: >60 mL/min/1.73m2 EGFR, calc. for ages 25 and older using the  MDRD formula (not corrected for weight), is valid for stable  renal function.  GFR  03/03/2022 >60  >60 Final    Comment: Chronic Kidney Disease: less than 60 ml/min/1.73 sq.m. Kidney Failure: less than 15 ml/min/1.73 sq.m. Results valid for patients 18 years and older.       Calcium 03/03/2022 8.2* 8.3 - 10.6 mg/dL Final    WBC 03/03/2022 8.8  4.0 - 11.0 K/uL Final    RBC 03/03/2022 2.62* 4.20 - 5.90 M/uL Final    Hemoglobin 03/03/2022 8.7* 13.5 - 17.5 g/dL Final    Hematocrit 03/03/2022 25.3* 40.5 - 52.5 % Final    MCV 03/03/2022 96.3  80.0 - 100.0 fL Final    MCH 03/03/2022 33.1  26.0 - 34.0 pg Final    MCHC 03/03/2022 34.3  31.0 - 36.0 g/dL Final    RDW 03/03/2022 13.8  12.4 - 15.4 % Final    Platelets 24/81/9262 174  135 - 450 K/uL Final    MPV 03/03/2022 9.2  5.0 - 10.5 fL Final    Neutrophils % 03/03/2022 74.6  % Final    Lymphocytes % 03/03/2022 14.1  % Final    Monocytes % 03/03/2022 10.8  % Final    Eosinophils % 03/03/2022 0.2  % Final    Basophils % 03/03/2022 0.3  % Final    Neutrophils Absolute 03/03/2022 6.6  1.7 - 7.7 K/uL Final    Lymphocytes Absolute 03/03/2022 1.2  1.0 - 5.1 K/uL Final    Monocytes Absolute 03/03/2022 1.0  0.0 - 1.3 K/uL Final    Eosinophils Absolute 03/03/2022 0.0  0.0 - 0.6 K/uL Final    Basophils Absolute 03/03/2022 0.0  0.0 - 0.2 K/uL Final    Sodium 03/04/2022 135* 136 - 145 mmol/L Final    Potassium 03/04/2022 3.4* 3.5 - 5.1 mmol/L Final    Chloride 03/04/2022 101  99 - 110 mmol/L Final    CO2 03/04/2022 25  21 - 32 mmol/L Final    Anion Gap 03/04/2022 9  3 - 16 Final    Glucose 03/04/2022 97  70 - 99 mg/dL Final    BUN 03/04/2022 3* 7 - 20 mg/dL Final    CREATININE 03/04/2022 0.6* 0.9 - 1.3 mg/dL Final    GFR Non- 03/04/2022 >60  >60 Final    Comment: >60 mL/min/1.73m2 EGFR, calc. for ages 25 and older using the  MDRD formula (not corrected for weight), is valid for stable  renal function.  GFR  03/04/2022 >60  >60 Final    Comment: Chronic Kidney Disease: less than 60 ml/min/1.73 sq.m. Kidney Failure: less than 15 ml/min/1.73 sq.m. Results valid for patients 18 years and older.       Calcium 03/04/2022 8.3  8.3 - 10.6 mg/dL Final    WBC 03/04/2022 6.0  4.0 - 11.0 K/uL Final    RBC 03/04/2022 2.54* 4.20 - 5.90 M/uL Final    Hemoglobin 03/04/2022 8.4* 13.5 - 17.5 g/dL Final    Hematocrit 03/04/2022 24.3* 40.5 - 52.5 % Final    MCV 03/04/2022 95.8  80.0 - 100.0 fL Final    MCH 03/04/2022 32.9  26.0 - 34.0 pg Final    MCHC 03/04/2022 34.4  31.0 - 36.0 g/dL Final    RDW 03/04/2022 13.7  12.4 - 15.4 % Final    Platelets 73/40/6699 182  135 - 450 K/uL Final    MPV 03/04/2022 8.6  5.0 - 10.5 fL Final    Neutrophils % 03/04/2022 61.5  % Final    Lymphocytes % 03/04/2022 23.5  % Final    Monocytes % 03/04/2022 11.9  % Final    Eosinophils % 03/04/2022 2.4  % Final    Basophils % 03/04/2022 0.7  % Final    Neutrophils Absolute 03/04/2022 3.7  1.7 - 7.7 K/uL Final    Lymphocytes Absolute 03/04/2022 1.4  1.0 - 5.1 K/uL Final    Monocytes Absolute 03/04/2022 0.7  0.0 - 1.3 K/uL Final    Eosinophils Absolute 03/04/2022 0.1  0.0 - 0.6 K/uL Final    Basophils Absolute 03/04/2022 0.0  0.0 - 0.2 K/uL Final   Orders Only on 02/24/2022   Component Date Value Ref Range Status    SARS-CoV-2 02/24/2022 Not Detected  Not detected Final    Comment: This test has been authorized by FDA under an  Emergency Use Authorization (EUA). This test is only authorized for the duration of the  time of declaration that circumstances exist justifying the  authorization of the emergency use of in vitro diagnostic  testing for detection of the SARS-CoV-2 virus  and/or diagnosis of COVID-19 infection under  section 564 (b)(1) of the Act, 21 U. S.C. 427WNS-5 (b) (1),  unless the authorization is terminated or revoked sooner. Patient Fact [email or web address]  Provider Fact [email or web address]    METHODOLOGY: RT PCR       Assessment:  OR Date 3/1/2022, reversal of loop ileostomy and resection of anastomosis     Plan:  1. Continue local wound care with dry dressing  2. May bathe normally, avoid scrubbing over incision  3. Continue antibiotics  4. No heavy lifting over 20lbs for 6 weeks total postop  5. Follow up with general surgery office as needed    Ted Manjarrez.  [facility] MD, FACS  3/15/2022  1:58 PM

## 2022-03-21 ENCOUNTER — NURSE TRIAGE (OUTPATIENT)
Dept: OTHER | Facility: CLINIC | Age: 52
End: 2022-03-21

## 2022-03-21 ENCOUNTER — HOSPITAL ENCOUNTER (EMERGENCY)
Age: 52
Discharge: HOME | End: 2022-03-21
Payer: COMMERCIAL

## 2022-03-21 VITALS
HEIGHT: 70 IN | OXYGEN SATURATION: 99 % | RESPIRATION RATE: 18 BRPM | SYSTOLIC BLOOD PRESSURE: 126 MMHG | BODY MASS INDEX: 22.05 KG/M2 | HEART RATE: 79 BPM | DIASTOLIC BLOOD PRESSURE: 86 MMHG | TEMPERATURE: 98.7 F | WEIGHT: 154 LBS

## 2022-03-21 DIAGNOSIS — R10.30 LOWER ABDOMINAL PAIN: Primary | ICD-10-CM

## 2022-03-21 LAB
A/G RATIO: 1.2 (ref 1.1–2.2)
ALBUMIN SERPL-MCNC: 3.7 G/DL (ref 3.4–5)
ALP BLD-CCNC: 108 U/L (ref 40–129)
ALT SERPL-CCNC: 19 U/L (ref 10–40)
ANION GAP SERPL CALCULATED.3IONS-SCNC: 12 MMOL/L (ref 3–16)
AST SERPL-CCNC: 16 U/L (ref 15–37)
BASOPHILS ABSOLUTE: 0.1 K/UL (ref 0–0.2)
BASOPHILS RELATIVE PERCENT: 1.3 %
BILIRUB SERPL-MCNC: 0.4 MG/DL (ref 0–1)
BILIRUBIN URINE: ABNORMAL
BLOOD, URINE: NEGATIVE
BUN BLDV-MCNC: 9 MG/DL (ref 7–20)
CALCIUM SERPL-MCNC: 9.8 MG/DL (ref 8.3–10.6)
CHLORIDE BLD-SCNC: 103 MMOL/L (ref 99–110)
CLARITY: CLEAR
CO2: 21 MMOL/L (ref 21–32)
COLOR: YELLOW
CREAT SERPL-MCNC: 0.7 MG/DL (ref 0.9–1.3)
EOSINOPHILS ABSOLUTE: 0.2 K/UL (ref 0–0.6)
EOSINOPHILS RELATIVE PERCENT: 2.8 %
GFR AFRICAN AMERICAN: >60
GFR NON-AFRICAN AMERICAN: >60
GLUCOSE BLD-MCNC: 102 MG/DL (ref 70–99)
GLUCOSE URINE: NEGATIVE MG/DL
HCT VFR BLD CALC: 40.4 % (ref 40.5–52.5)
HEMOGLOBIN: 13.5 G/DL (ref 13.5–17.5)
KETONES, URINE: NEGATIVE MG/DL
LEUKOCYTE ESTERASE, URINE: NEGATIVE
LYMPHOCYTES ABSOLUTE: 1.6 K/UL (ref 1–5.1)
LYMPHOCYTES RELATIVE PERCENT: 20.8 %
MCH RBC QN AUTO: 31.4 PG (ref 26–34)
MCHC RBC AUTO-ENTMCNC: 33.3 G/DL (ref 31–36)
MCV RBC AUTO: 94.5 FL (ref 80–100)
MICROSCOPIC EXAMINATION: ABNORMAL
MONOCYTES ABSOLUTE: 0.8 K/UL (ref 0–1.3)
MONOCYTES RELATIVE PERCENT: 10.1 %
NEUTROPHILS ABSOLUTE: 4.9 K/UL (ref 1.7–7.7)
NEUTROPHILS RELATIVE PERCENT: 65 %
NITRITE, URINE: NEGATIVE
PDW BLD-RTO: 13.9 % (ref 12.4–15.4)
PH UA: 5 (ref 5–8)
PLATELET # BLD: 581 K/UL (ref 135–450)
PMV BLD AUTO: 7.5 FL (ref 5–10.5)
POTASSIUM REFLEX MAGNESIUM: 4.9 MMOL/L (ref 3.5–5.1)
PROTEIN UA: NEGATIVE MG/DL
RBC # BLD: 4.28 M/UL (ref 4.2–5.9)
SODIUM BLD-SCNC: 136 MMOL/L (ref 136–145)
SPECIFIC GRAVITY UA: >=1.03 (ref 1–1.03)
TOTAL PROTEIN: 6.8 G/DL (ref 6.4–8.2)
URINE REFLEX TO CULTURE: ABNORMAL
URINE TYPE: ABNORMAL
UROBILINOGEN, URINE: 0.2 E.U./DL
WBC # BLD: 7.5 K/UL (ref 4–11)

## 2022-03-21 PROCEDURE — 36415 COLL VENOUS BLD VENIPUNCTURE: CPT

## 2022-03-21 PROCEDURE — 81003 URINALYSIS AUTO W/O SCOPE: CPT

## 2022-03-21 PROCEDURE — 99283 EMERGENCY DEPT VISIT LOW MDM: CPT

## 2022-03-21 PROCEDURE — 80053 COMPREHEN METABOLIC PANEL: CPT

## 2022-03-21 PROCEDURE — 85025 COMPLETE CBC W/AUTO DIFF WBC: CPT

## 2022-03-21 ASSESSMENT — PAIN SCALES - GENERAL: PAINLEVEL_OUTOF10: 7

## 2022-03-21 ASSESSMENT — PAIN DESCRIPTION - PAIN TYPE: TYPE: ACUTE PAIN

## 2022-03-21 ASSESSMENT — PAIN - FUNCTIONAL ASSESSMENT: PAIN_FUNCTIONAL_ASSESSMENT: 0-10

## 2022-03-21 ASSESSMENT — PAIN DESCRIPTION - LOCATION: LOCATION: ABDOMEN

## 2022-03-21 ASSESSMENT — PAIN DESCRIPTION - FREQUENCY: FREQUENCY: INTERMITTENT

## 2022-03-21 NOTE — TELEPHONE ENCOUNTER
Received call from Attila Chan at [facility] with [employer]. Subjective: Caller states \"reversal of an ostomy on 3/1/22 and stitches removed last Tuesday and is now having abdominal pain on the right side where they did the surgery. He did have an infection at the site and was on antibiotics and was cleared by the surgeon last week. The pain in the right side is sharp and the pain in the middle of the stomach feels like a spasm. He doesn't have the energy to do anything, the surgeon new how he felt and didn't seem to be worry about it. \"     LIMITED TRIAGE DUE TO PATIENT NOT WITH CALLER      Current Symptoms: abdominal pain    Onset: 4 days ago; worsening    Associated Symptoms: NA    Pain Severity: unknown due to pt not with caller/10; N/A; none    Temperature:  Denies Fever    What has been tried: heat compresses, muscle relaxer    LMP: NA Pregnant: NA    Recommended disposition: Go to ED Now. Advised to call back with new or worsening symptoms     Care advice provided, patient verbalizes understanding; denies any other questions or concerns; instructed to call back for any new or worsening symptoms. Patient/caller agrees to proceed to the Emergency Department     Attention Provider: Thank you for allowing me to participate in the care of your patient. The patient was connected to triage in response to information provided to the ECC/PSC. Please do not respond through this encounter as the response is not directed to a shared pool.           Reason for Disposition   SEVERE abdominal pain (e.g., excruciating)    Protocols used: ABDOMINAL PAIN - MALE-ADULT-[place]

## 2022-03-21 NOTE — ED TRIAGE NOTES
Pt discharged home, verbalized discharge instructions. Ambulated independently to exit without difficulty.

## 2022-03-21 NOTE — ED PROVIDER NOTES
[facility]        Pt Name: Verlean Fleischer  MRN: [id number]  [place] 1970  Date of evaluation: 3/21/2022  Provider: LUDA Nath - MOSES  PCP: Louis Johnston MD  Note Started: 1:17 PM EDT       ALBERTO. I have evaluated this patient. My supervising physician was available for consultation. CHIEF COMPLAINT       Abdominal pain    HISTORY OF PRESENT ILLNESS   (Location, Timing/Onset, Context/Setting, Quality, Duration, Modifying Factors, Severity, Associated Signs and Symptoms)  Note limiting factors. Chief Complaint: Lower abdominal pain    Verlean Fleischer is a 47 y.o. male who presents the emergency department today reporting lower abdominal pain. Patient with recent colostomy reversal on 3/1/22 per general surgeon Dr. Abdi Lepe. Patient had an exploratory laparotomy sigmoidectomy with diverting loop ileostomy for severe acute diverticulitis with abscess on October 7, 2021. Patient states that over the last couple of weeks he has had epigastric discomfort. Patient states that over the last couple of days he has noticed bilateral lower abdominal discomfort. This is intermittent and sharp in nature. He states that he had a recent follow-up appointment with his surgeon on March 15. Patient denies having any discomfort at present but states that when the pain comes it is a 6 out of 10. There are no aggravating or alleviating factors. He denies urinary frequency or hematuria but states that he sometimes has mild burning with urination. Patient states that he has had this since his catheter was placed for surgery. Patient denies penile discharge. He adamantly denies concern for any STDs. Patient states that he has had normal bowel movements. There is been no recent travel or known sick contacts. He denies fever, chills, or other symptoms.     Nursing Notes were all reviewed and agreed with or any disagreements were addressed in the HPI. REVIEW OF SYSTEMS    (2-9 systems for level 4, 10 or more for level 5)     Review of Systems   Constitutional: Negative for chills, fatigue and fever. HENT: Negative for congestion and sore throat. Eyes: Negative for visual disturbance. Respiratory: Negative for cough, chest tightness, shortness of breath and wheezing. Cardiovascular: Negative for chest pain and palpitations. Gastrointestinal: Positive for abdominal pain. Negative for anal bleeding, blood in stool, constipation, diarrhea, nausea and vomiting. Endocrine: Negative for polydipsia and polyuria. Genitourinary: Negative for difficulty urinating and dysuria. Musculoskeletal: Negative for back pain, neck pain and neck stiffness. Skin: Negative for rash and wound. Neurological: Negative for dizziness, weakness and light-headedness. Hematological: Does not bruise/bleed easily. Psychiatric/Behavioral: Negative for suicidal ideas. Positives and Pertinent negatives as per HPI. Except as noted above in the ROS, all other systems were reviewed and negative. PAST MEDICAL HISTORY     Past Medical History:   Diagnosis Date    Renal lesion 11/15/2021         SURGICAL HISTORY     Past Surgical History:   Procedure Laterality Date    COLOSTOMY N/A 10/27/2021    EXPLORATORY LAPAROTOMY, SIGMOID RESECTION, ILEOSTOMY. performed by Jamie Lyn MD at [facility] N/A 3/1/2022    OPEN REVERSAL OF LOOP ILEOSTOMY performed by Jamie Lyn MD at [place] [address]       There are no discharge medications for this patient. ALLERGIES     Patient has no known allergies.     FAMILYHISTORY       Family History   Problem Relation Age of Onset    No Known Problems Mother     No Known Problems Father           SOCIAL HISTORY       Social History     Tobacco Use    Smoking status: Current Every Day Smoker     Types: Cigarettes    Smokeless tobacco: Never Used   Vaping Use    Vaping Use: Never used   Substance Use Topics    Alcohol use: Yes     Comment: Daily    Drug use: Yes     Types: Marijuana (Weed)       SCREENINGS    [place] Coma Scale  Eye Opening: Spontaneous  Best Verbal Response: Oriented  Best Motor Response: Obeys commands  Yoav Coma Scale Score: 15        PHYSICAL EXAM    (up to 7 for level 4, 8 or more for level 5)     ED Triage Vitals [03/21/22 1035]   BP Temp Temp src Pulse Resp SpO2 Height Weight   126/86 98.7 °F (37.1 °C) -- 79 18 99 % 5' 10\" (1.778 m) 154 lb (69.9 kg)       Physical Exam  Vitals and nursing note reviewed. Constitutional:       General: He is not in acute distress. Appearance: Normal appearance. He is not ill-appearing, toxic-appearing or diaphoretic. HENT:      Head: Normocephalic and atraumatic. Right Ear: External ear normal.      Left Ear: External ear normal.      Nose: Nose normal.      Mouth/Throat:      Mouth: Mucous membranes are moist.      Pharynx: Oropharynx is clear. Eyes:      General:         Right eye: No discharge. Left eye: No discharge. Extraocular Movements: Extraocular movements intact. Conjunctiva/sclera: Conjunctivae normal.   Cardiovascular:      Rate and Rhythm: Normal rate and regular rhythm. Pulses: Normal pulses. Heart sounds: Normal heart sounds. Pulmonary:      Effort: Pulmonary effort is normal. No respiratory distress. Breath sounds: Normal breath sounds. Abdominal:      General: Bowel sounds are normal.      Palpations: Abdomen is soft. Tenderness: There is no abdominal tenderness. There is no right CVA tenderness or left CVA tenderness. Musculoskeletal:         General: Normal range of motion. Cervical back: Normal range of motion and neck supple. Right lower leg: No edema. Left lower leg: No edema. Skin:     General: Skin is warm and dry. Capillary Refill: Capillary refill takes less than 2 seconds.    Neurological:      General: No focal deficit present. Mental Status: He is alert and oriented to person, place, and time. Psychiatric:         Mood and Affect: Mood normal.         Behavior: Behavior normal.         Thought Content: Thought content normal.         Judgment: Judgment normal.         DIAGNOSTIC RESULTS   LABS:    Labs Reviewed   CBC WITH AUTO DIFFERENTIAL - Abnormal; Notable for the following components:       Result Value    Hematocrit 40.4 (*)     Platelets 895 (*)     All other components within normal limits   COMPREHENSIVE METABOLIC PANEL W/ REFLEX TO MG FOR LOW K - Abnormal; Notable for the following components:    Glucose 102 (*)     CREATININE 0.7 (*)     All other components within normal limits   URINALYSIS WITH REFLEX TO CULTURE - Abnormal; Notable for the following components:    Bilirubin Urine SMALL (*)     All other components within normal limits       When ordered only abnormal lab results are displayed. All other labs were within normal range or not returned as of this dictation. EKG: When ordered, EKG's are interpreted by the Emergency Department Physician in the absence of a cardiologist.  Please see their note for interpretation of EKG. RADIOLOGY:   Non-plain film images such as CT, Ultrasound and MRI are read by the radiologist. Plain radiographic images are visualized and preliminarily interpreted by the ED Provider with the below findings:        Interpretation per the Radiologist below, if available at the time of this note:    No orders to display     No results found.         PROCEDURES   Unless otherwise noted below, none     Procedures    CRITICAL CARE TIME   none    CONSULTS:  None      EMERGENCY DEPARTMENT COURSE and DIFFERENTIAL DIAGNOSIS/MDM:   Vitals:    Vitals:    03/21/22 1035   BP: 126/86   Pulse: 79   Resp: 18   Temp: 98.7 °F (37.1 °C)   SpO2: 99%   Weight: 154 lb (69.9 kg)   Height: 5' 10\" (1.778 m)       Patient was given the following medications:  Medications - No data to display        Previous records reviewed in order to gain further information regarding patient's PMH as well as his HPI. Nursing notes reviewed. This is a 52-year-old male status post colostomy reversal who presents for evaluation of intermittent bilateral lower abdominal cramping for the past 2 weeks. Patient's was seen by his general surgeon Dr. Dimitry Akbar on 3/15 and was \"cleared\". Patient states that he wanted to come in to get things \"checked out\". He denies any nausea, vomiting, or diarrhea. He states that his bowel movements are normal.  He denies having any pain at present and declines need for any pain medication. Laboratory studies have been unremarkable. Patient reassessed. He has remained stable throughout ED visit. He states that he had 1 episode of cramping while here in the ED. His abdomen has remained soft and nontender. At this time there is no evidence of any life-threatening or emergent conditions requiring immediate intervention. Imaging studies not indicated at this time. Shared decision making between patient and myself and patient has declined any further interventions at this time and states he would like to follow-up as outpatient. I believe this is reasonable. Patient will be discharged with emphasis on close outpatient follow-up. He is encouraged increase his fluid intake and to continue a bland diet for the next several days. He agrees to follow-up with his PCP as directed. He agrees return for high fever, incessant vomiting, severe pain, any other worsening symptoms. Patient discharged home in stable condition. FINAL IMPRESSION      1.  Lower abdominal pain          DISPOSITION/PLAN   DISPOSITION Decision To Discharge 03/21/2022 01:22:59 PM      PATIENT REFERRED TO:  Bhupendra Higgins MD  [address] [address] [phone]    In 1 day      Joenathan Blizzard, [address] [place] [address]  [phone]      As needed      DISCHARGE MEDICATIONS:  There are no discharge medications for this patient. DISCONTINUED MEDICATIONS:  There are no discharge medications for this patient.              (Please note that portions of this note were completed with a voice recognition program.  Efforts were made to edit the dictations but occasionally words are mis-transcribed.)    LUDA Roth CNP (electronically signed)            LUDA Roth CNP  03/23/22 4555

## 2022-03-23 ASSESSMENT — ENCOUNTER SYMPTOMS
WHEEZING: 0
CHEST TIGHTNESS: 0
NAUSEA: 0
ABDOMINAL PAIN: 1
COUGH: 0
DIARRHEA: 0
BLOOD IN STOOL: 0
BACK PAIN: 0
SHORTNESS OF BREATH: 0
ANAL BLEEDING: 0
SORE THROAT: 0
CONSTIPATION: 0
VOMITING: 0

## 2022-11-09 ENCOUNTER — TELEMEDICINE (OUTPATIENT)
Dept: INTERNAL MEDICINE CLINIC | Age: 52
End: 2022-11-09
Payer: COMMERCIAL

## 2022-11-09 DIAGNOSIS — U07.1 COVID: Primary | ICD-10-CM

## 2022-11-09 PROCEDURE — 99213 OFFICE O/P EST LOW 20 MIN: CPT | Performed by: INTERNAL MEDICINE

## 2022-11-09 RX ORDER — NIRMATRELVIR AND RITONAVIR 300-100 MG
KIT ORAL
Qty: 30 TABLET | Refills: 0 | Status: SHIPPED | OUTPATIENT
Start: 2022-11-09 | End: 2022-11-14

## 2022-11-09 SDOH — ECONOMIC STABILITY: FOOD INSECURITY: WITHIN THE PAST 12 MONTHS, YOU WORRIED THAT YOUR FOOD WOULD RUN OUT BEFORE YOU GOT MONEY TO BUY MORE.: NEVER TRUE

## 2022-11-09 SDOH — ECONOMIC STABILITY: FOOD INSECURITY: WITHIN THE PAST 12 MONTHS, THE FOOD YOU BOUGHT JUST DIDN'T LAST AND YOU DIDN'T HAVE MONEY TO GET MORE.: NEVER TRUE

## 2022-11-09 ASSESSMENT — PATIENT HEALTH QUESTIONNAIRE - PHQ9
SUM OF ALL RESPONSES TO PHQ9 QUESTIONS 1 & 2: 0
SUM OF ALL RESPONSES TO PHQ QUESTIONS 1-9: 0
2. FEELING DOWN, DEPRESSED OR HOPELESS: 0
SUM OF ALL RESPONSES TO PHQ QUESTIONS 1-9: 0
SUM OF ALL RESPONSES TO PHQ QUESTIONS 1-9: 0
1. LITTLE INTEREST OR PLEASURE IN DOING THINGS: 0
SUM OF ALL RESPONSES TO PHQ QUESTIONS 1-9: 0

## 2022-11-09 ASSESSMENT — ENCOUNTER SYMPTOMS
SHORTNESS OF BREATH: 0
RHINORRHEA: 0
HEARTBURN: 0
SORE THROAT: 0
COUGH: 1

## 2022-11-09 ASSESSMENT — SOCIAL DETERMINANTS OF HEALTH (SDOH): HOW HARD IS IT FOR YOU TO PAY FOR THE VERY BASICS LIKE FOOD, HOUSING, MEDICAL CARE, AND HEATING?: NOT HARD AT ALL

## 2022-11-09 NOTE — LETTER
[facility]  [address] [place] [address]  Phone: [phone]  Fax: [phone]    Zeny Scott MD        November 9, 2022     Patient: Issac Galindo   YOB: 1970   Date of Visit: 11/9/2022       To Whom It May Concern:     Shelley Tran has been diagnosed with COVID, he is advised to self isolate for the next 5 days after that he can use a mask when out in public. If you have any questions or concerns, please don't hesitate to call.     Sincerely,    Electronically signed by Zeny Scott MD on 11/9/2022 at 9:26 AM    Zeny Scott MD

## 2022-11-09 NOTE — PROGRESS NOTES
Asif Yin (:  1970) is a Established patient, here for evaluation of the following:    Assessment & Plan   Below is the assessment and plan developed based on review of pertinent history, physical exam, labs, studies, and medications. 1. COVID  -     nirmatrelvir/ritonavir (PAXLOVID, 300/100,) 20 x 150 MG & 10 x 100MG TBPK; Take 3 tablets (two 150 mg nirmatrelvir and one 100 mg ritonavir tablets) by mouth every 12 hours for 5 days. , Disp-30 tablet, R-0Normal  This patient patient does have COVID positive testing we will get a treat him as described above patient is encouraged to increase fluid intake discussed with him the warning signs and he is contact me if any of his symptomatology changes  Return if symptoms worsen or fail to improve, for As scheduled. Subjective   Cough  This is a new problem. Associated symptoms include myalgias and nasal congestion. Pertinent negatives include no chest pain, chills, ear congestion, ear pain, fever, heartburn, rhinorrhea, sore throat, shortness of breath, sweats or weight loss. Pain  Associated symptoms include coughing and myalgias. Pertinent negatives include no chest pain, chills, fever or sore throat. Positive For Covid-19  Associated symptoms include coughing and myalgias. Pertinent negatives include no chest pain, chills, fever or sore throat. Review of Systems   Constitutional:  Negative for chills, fever and weight loss. HENT:  Negative for ear pain, rhinorrhea and sore throat. Respiratory:  Positive for cough. Negative for shortness of breath. Cardiovascular:  Negative for chest pain. Gastrointestinal:  Negative for heartburn. Musculoskeletal:  Positive for myalgias.         Objective   Patient-Reported Vitals  Patient-Reported Pulse: 70  Patient-Reported Temperature: 101  Patient-Reported Weight: 165ln  Patient-Reported Pulse Oximetry: 97       Physical Exam  [INSTRUCTIONS:  \"[x]\" Indicates a positive item  \"[]\" Indicates a negative item  -- DELETE ALL ITEMS NOT EXAMINED]    Constitutional: [x] Appears well-developed and well-nourished [x] No apparent distress      [] Abnormal -     Mental status: [x] Alert and awake  [x] Oriented to person/place/time [x] Able to follow commands    [] Abnormal -     Eyes:   EOM    [x]  Normal    [] Abnormal -   Sclera  [x]  Normal    [] Abnormal -          Discharge [x]  None visible   [] Abnormal -     HENT: [x] Normocephalic, atraumatic  [] Abnormal -   [x] Mouth/Throat: Mucous membranes are moist    External Ears [x] Normal  [] Abnormal -    Neck: [x] No visualized mass [] Abnormal -     Pulmonary/Chest: [x] Respiratory effort normal   [x] No visualized signs of difficulty breathing or respiratory distress        [] Abnormal -      Musculoskeletal:   [x] Normal gait with no signs of ataxia         [x] Normal range of motion of neck        [] Abnormal -     Neurological:        [x] No Facial Asymmetry (Cranial nerve 7 motor function) (limited exam due to video visit)          [x] No gaze palsy        [] Abnormal -          Skin:        [x] No significant exanthematous lesions or discoloration noted on facial skin         [] Abnormal -            Psychiatric:       [x] Normal Affect [] Abnormal -        [x] No Hallucinations    Other pertinent observable physical exam findings:-             Bertram Nielsen, was evaluated through a synchronous (real-time) audio-video encounter. The patient (or guardian if applicable) is aware that this is a billable service, which includes applicable co-pays. This Virtual Visit was conducted with patient's (and/or legal guardian's) consent. The visit was conducted pursuant to the emergency declaration under the [address] [place] [address], [address] [place] [address] authority and the [employer] General Act. Patient identification was verified, and a caregiver was present when appropriate.    The patient was located at Home: [address] [facility] [address].    Provider was located at [facility] ([address] [place] [address] Dept): [facility],  [address].        --Christine Raya MD

## 2023-01-17 ENCOUNTER — TELEPHONE (OUTPATIENT)
Dept: INTERNAL MEDICINE CLINIC | Age: 53
End: 2023-01-17

## 2023-04-25 ENCOUNTER — OFFICE VISIT (OUTPATIENT)
Dept: INTERNAL MEDICINE CLINIC | Age: 53
End: 2023-04-25
Payer: COMMERCIAL

## 2023-04-25 VITALS
HEART RATE: 86 BPM | SYSTOLIC BLOOD PRESSURE: 122 MMHG | HEIGHT: 70 IN | OXYGEN SATURATION: 99 % | DIASTOLIC BLOOD PRESSURE: 80 MMHG | BODY MASS INDEX: 26.2 KG/M2 | WEIGHT: 183 LBS

## 2023-04-25 DIAGNOSIS — M54.50 ACUTE LEFT-SIDED LOW BACK PAIN WITHOUT SCIATICA: Primary | ICD-10-CM

## 2023-04-25 PROBLEM — Z93.9 HISTORY OF CREATION OF OSTOMY (HCC): Status: RESOLVED | Noted: 2022-03-01 | Resolved: 2023-04-25

## 2023-04-25 PROCEDURE — 99213 OFFICE O/P EST LOW 20 MIN: CPT | Performed by: INTERNAL MEDICINE

## 2023-04-25 RX ORDER — METHOCARBAMOL 500 MG/1
500 TABLET, FILM COATED ORAL 4 TIMES DAILY
Qty: 40 TABLET | Refills: 0 | Status: SHIPPED | OUTPATIENT
Start: 2023-04-25 | End: 2023-05-05

## 2023-04-25 RX ORDER — PREDNISONE 20 MG/1
20 TABLET ORAL 2 TIMES DAILY
Qty: 10 TABLET | Refills: 0 | Status: SHIPPED | OUTPATIENT
Start: 2023-04-25 | End: 2023-04-30

## 2023-04-25 ASSESSMENT — PATIENT HEALTH QUESTIONNAIRE - PHQ9
SUM OF ALL RESPONSES TO PHQ QUESTIONS 1-9: 0
2. FEELING DOWN, DEPRESSED OR HOPELESS: 0

## 2023-04-25 ASSESSMENT — ENCOUNTER SYMPTOMS
VISUAL CHANGE: 0
VOMITING: 0
BOWEL INCONTINENCE: 0
ABDOMINAL PAIN: 0

## 2023-04-25 NOTE — PROGRESS NOTES
Leslie Hernandez (:  1970) is a 47 y.o. male,Established patient, here for evaluation of the following chief complaint(s):  Fall ( fell on stair case (went high into the air) left side of back. Lower and around kidney area.)         ASSESSMENT/PLAN:  1. Acute left-sided low back pain without sciatica  -     XR LUMBAR SPINE (MIN 6 VIEWS); Future  -     methocarbamol (ROBAXIN) 500 MG tablet; Take 1 tablet by mouth 4 times daily for 10 days, Disp-40 tablet, R-0Normal  -     predniSONE (DELTASONE) 20 MG tablet; Take 1 tablet by mouth 2 times daily for 5 days, Disp-10 tablet, R-0Normal    Overall the patient seems to be doing fairly well his symptoms have improved since he did fall he does have a bruise in the back as well as some pain on rotation so at this point we will have the patient start with muscle relaxant and heat and add an anti-inflammatory like prednisone if his symptoms do not improve within the next 48 hours told that after his x-ray is done and does not show any fractures  No follow-ups on file.          Subjective   SUBJECTIVE/OBJECTIVE:    Lab Review   Lab Results   Component Value Date/Time     2022 11:00 AM     2022 05:32 AM     2022 04:45 AM    K 4.9 2022 11:00 AM    K 3.4 2022 05:32 AM    K 3.6 2022 04:45 AM    K 4.4 2022 04:52 AM    K 4.0 10/18/2021 12:12 PM    CO2 21 2022 11:00 AM    CO2 25 2022 05:32 AM    CO2 21 2022 04:45 AM    BUN 9 2022 11:00 AM    BUN 3 2022 05:32 AM    BUN 6 2022 04:45 AM    CREATININE 0.7 2022 11:00 AM    CREATININE 0.6 2022 05:32 AM    CREATININE 0.6 2022 04:45 AM    GLUCOSE 102 2022 11:00 AM    GLUCOSE 97 2022 05:32 AM    GLUCOSE 94 2022 04:45 AM    CALCIUM 9.8 2022 11:00 AM    CALCIUM 8.3 2022 05:32 AM    CALCIUM 8.2 2022 04:45 AM     Lab Results   Component Value Date/Time    WBC 7.5 2022 11:00 AM

## 2023-08-28 ENCOUNTER — OFFICE VISIT (OUTPATIENT)
Dept: INTERNAL MEDICINE CLINIC | Age: 53
End: 2023-08-28
Payer: COMMERCIAL

## 2023-08-28 VITALS
WEIGHT: 178 LBS | HEART RATE: 83 BPM | DIASTOLIC BLOOD PRESSURE: 90 MMHG | SYSTOLIC BLOOD PRESSURE: 160 MMHG | OXYGEN SATURATION: 98 % | BODY MASS INDEX: 25.54 KG/M2

## 2023-08-28 DIAGNOSIS — M54.9 UPPER BACK PAIN, CHRONIC: ICD-10-CM

## 2023-08-28 DIAGNOSIS — R03.0 ELEVATED BLOOD PRESSURE READING IN OFFICE WITHOUT DIAGNOSIS OF HYPERTENSION: ICD-10-CM

## 2023-08-28 DIAGNOSIS — G89.29 UPPER BACK PAIN, CHRONIC: ICD-10-CM

## 2023-08-28 DIAGNOSIS — M54.2 NECK PAIN: Primary | ICD-10-CM

## 2023-08-28 PROCEDURE — 99214 OFFICE O/P EST MOD 30 MIN: CPT | Performed by: INTERNAL MEDICINE

## 2023-08-28 ASSESSMENT — ENCOUNTER SYMPTOMS
SHORTNESS OF BREATH: 0
BLURRED VISION: 0
ORTHOPNEA: 0

## 2023-08-28 NOTE — PROGRESS NOTES
chronic problem. The current episode started more than 1 year ago. The problem is unchanged. The problem is uncontrolled. Pertinent negatives include no anxiety, blurred vision, chest pain, headaches, malaise/fatigue, neck pain, orthopnea, palpitations, peripheral edema, PND, shortness of breath or sweats. Review of Systems   Constitutional:  Negative for fever and malaise/fatigue. Eyes:  Negative for blurred vision. Respiratory:  Negative for shortness of breath. Cardiovascular:  Negative for chest pain, palpitations, orthopnea and PND. Musculoskeletal:  Positive for arthralgias. Negative for neck pain and stiffness. Skin:  Negative for itching. Neurological:  Negative for tingling, numbness and headaches. Objective   Physical Exam  Vitals and nursing note reviewed. Musculoskeletal:      Cervical back: Spasms and tenderness present. Decreased range of motion. Thoracic back: Spasms and tenderness present. Back: This dictation was generated by voice recognition computer software. Although all attempts are made to edit the dictation for accuracy, there may be errors in the transcription that are not intended. An electronic signature was used to authenticate this note.     --Beto Perry MD

## 2023-08-30 ENCOUNTER — HOSPITAL ENCOUNTER (OUTPATIENT)
Age: 53
Discharge: HOME | End: 2023-08-30
Payer: COMMERCIAL

## 2023-08-30 ENCOUNTER — HOSPITAL ENCOUNTER (OUTPATIENT)
Dept: GENERAL RADIOLOGY | Age: 53
Discharge: HOME | End: 2023-08-30
Payer: COMMERCIAL

## 2023-08-30 DIAGNOSIS — M54.2 NECK PAIN: ICD-10-CM

## 2023-08-30 PROCEDURE — 72050 X-RAY EXAM NECK SPINE 4/5VWS: CPT

## 2025-03-19 ENCOUNTER — OFFICE VISIT (OUTPATIENT)
Age: 55
End: 2025-03-19

## 2025-03-19 VITALS
BODY MASS INDEX: 25.2 KG/M2 | HEIGHT: 70 IN | OXYGEN SATURATION: 93 % | WEIGHT: 176 LBS | HEART RATE: 90 BPM | DIASTOLIC BLOOD PRESSURE: 104 MMHG | TEMPERATURE: 98.3 F | SYSTOLIC BLOOD PRESSURE: 152 MMHG

## 2025-03-19 DIAGNOSIS — S92.002A CLOSED DISPLACED FRACTURE OF LEFT CALCANEUS, UNSPECIFIED PORTION OF CALCANEUS, INITIAL ENCOUNTER: Primary | ICD-10-CM

## 2025-03-21 ENCOUNTER — RESULTS FOLLOW-UP (OUTPATIENT)
Age: 55
End: 2025-03-21

## 2025-03-25 ENCOUNTER — OFFICE VISIT (OUTPATIENT)
Dept: ORTHOPEDIC SURGERY | Age: 55
End: 2025-03-25

## 2025-03-25 ENCOUNTER — PATIENT MESSAGE (OUTPATIENT)
Dept: INTERNAL MEDICINE CLINIC | Age: 55
End: 2025-03-25

## 2025-03-25 VITALS — WEIGHT: 176 LBS | HEIGHT: 70 IN | BODY MASS INDEX: 25.2 KG/M2

## 2025-03-25 DIAGNOSIS — S92.012A CLOSED DISPLACED FRACTURE OF BODY OF LEFT CALCANEUS, INITIAL ENCOUNTER: Primary | ICD-10-CM

## 2025-03-25 DIAGNOSIS — Z72.0 VAPES NICOTINE CONTAINING SUBSTANCE: ICD-10-CM

## 2025-03-25 RX ORDER — HYDROCODONE BITARTRATE AND ACETAMINOPHEN 5; 325 MG/1; MG/1
1 TABLET ORAL EVERY 6 HOURS PRN
Qty: 20 TABLET | Refills: 0 | Status: SHIPPED | OUTPATIENT
Start: 2025-03-25 | End: 2025-03-30

## 2025-03-25 NOTE — PROGRESS NOTES
IMPRESSION:  Left calcaneus displaced fracture.    PLAN:  I discussed that the overall alignment of this fracture is displaced and intraarticular. Recommend getting a CT scan to further evaluate fracture displacement. Continue in boot, NWB left leg. Rest, ice and elevate left leg above heart level to help with swelling. ASA for DVT prophylaxis. We discussed the risk of nonunion and or malunion.  We reapplied a boot today in the office and instructed him  in care. Follow up results CT scan. He is aware that he may need surgical repair.     The patient Vapes nicotine containing substance, and we discussed with the patient the risks of smoking on general health and also on bone and soft tissue healing (delay and non-union), and promised to cut down or stop smoking.     Smoking: Educated the patient regarding the hazards of smoking and that it harms their body in many ways. It increases the chance of developing heart disease, lung disease, cancer, and other health problems including poor bone and wound healing.    The importance of smoking cessation for optimal bone and wound healing was stressed. This was communicated verbally, 5 Minutes.       Halina Byrne MD

## 2025-03-26 ENCOUNTER — TELEPHONE (OUTPATIENT)
Dept: ORTHOPEDIC SURGERY | Age: 55
End: 2025-03-26

## 2025-03-26 ENCOUNTER — HOSPITAL ENCOUNTER (OUTPATIENT)
Dept: CT IMAGING | Age: 55
Discharge: HOME | End: 2025-03-26
Attending: ORTHOPAEDIC SURGERY
Payer: COMMERCIAL

## 2025-03-26 DIAGNOSIS — S92.012A CLOSED DISPLACED FRACTURE OF BODY OF LEFT CALCANEUS, INITIAL ENCOUNTER: ICD-10-CM

## 2025-03-26 PROCEDURE — 73700 CT LOWER EXTREMITY W/O DYE: CPT

## 2025-03-26 NOTE — TELEPHONE ENCOUNTER
[employer] / WORKABILITY:    Caller: LILLIAN AT [facility]     Phone#: [phone]     Fax#: [phone]    MEDCO/WORK[employer] Date of Service:    03-  REASON FOR CALL:         MEDCO14/WORKABILITY Form Missing        Could a medco 14 please completed from office visit,   return to work note needs to be on a medco

## 2025-03-27 ENCOUNTER — HOSPITAL ENCOUNTER (OUTPATIENT)
Age: 55
Setting detail: OUTPATIENT SURGERY
Discharge: HOME | End: 2025-03-27
Attending: ORTHOPAEDIC SURGERY | Admitting: ORTHOPAEDIC SURGERY
Payer: COMMERCIAL

## 2025-03-27 ENCOUNTER — APPOINTMENT (OUTPATIENT)
Dept: GENERAL RADIOLOGY | Age: 55
End: 2025-03-27
Attending: ORTHOPAEDIC SURGERY
Payer: COMMERCIAL

## 2025-03-27 ENCOUNTER — ANESTHESIA EVENT (OUTPATIENT)
Dept: OPERATING ROOM | Age: 55
End: 2025-03-27
Payer: COMMERCIAL

## 2025-03-27 ENCOUNTER — OFFICE VISIT (OUTPATIENT)
Dept: ORTHOPEDIC SURGERY | Age: 55
End: 2025-03-27

## 2025-03-27 ENCOUNTER — ANESTHESIA (OUTPATIENT)
Dept: OPERATING ROOM | Age: 55
End: 2025-03-27
Payer: COMMERCIAL

## 2025-03-27 VITALS
DIASTOLIC BLOOD PRESSURE: 85 MMHG | RESPIRATION RATE: 12 BRPM | HEART RATE: 71 BPM | OXYGEN SATURATION: 98 % | SYSTOLIC BLOOD PRESSURE: 123 MMHG | WEIGHT: 176 LBS | HEIGHT: 70 IN | BODY MASS INDEX: 25.2 KG/M2 | TEMPERATURE: 98.3 F

## 2025-03-27 DIAGNOSIS — S92.012A CLOSED DISPLACED FRACTURE OF BODY OF LEFT CALCANEUS, INITIAL ENCOUNTER: Primary | ICD-10-CM

## 2025-03-27 DIAGNOSIS — Z72.0 VAPES NICOTINE CONTAINING SUBSTANCE: ICD-10-CM

## 2025-03-27 PROCEDURE — 6360000002 HC RX W HCPCS: Performed by: NURSE ANESTHETIST, CERTIFIED REGISTERED

## 2025-03-27 PROCEDURE — 2709999900 HC NON-CHARGEABLE SUPPLY: Performed by: ORTHOPAEDIC SURGERY

## 2025-03-27 PROCEDURE — 6360000002 HC RX W HCPCS: Performed by: ORTHOPAEDIC SURGERY

## 2025-03-27 PROCEDURE — 3700000000 HC ANESTHESIA ATTENDED CARE: Performed by: ORTHOPAEDIC SURGERY

## 2025-03-27 PROCEDURE — C1769 GUIDE WIRE: HCPCS | Performed by: ORTHOPAEDIC SURGERY

## 2025-03-27 PROCEDURE — 73650 X-RAY EXAM OF HEEL: CPT

## 2025-03-27 PROCEDURE — 2720000010 HC SURG SUPPLY STERILE: Performed by: ORTHOPAEDIC SURGERY

## 2025-03-27 PROCEDURE — 3700000001 HC ADD 15 MINUTES (ANESTHESIA): Performed by: ORTHOPAEDIC SURGERY

## 2025-03-27 PROCEDURE — 6360000002 HC RX W HCPCS: Performed by: ANESTHESIOLOGY

## 2025-03-27 PROCEDURE — 7100000000 HC PACU RECOVERY - FIRST 15 MIN: Performed by: ORTHOPAEDIC SURGERY

## 2025-03-27 PROCEDURE — 7100000010 HC PHASE II RECOVERY - FIRST 15 MIN: Performed by: ORTHOPAEDIC SURGERY

## 2025-03-27 PROCEDURE — 7100000011 HC PHASE II RECOVERY - ADDTL 15 MIN: Performed by: ORTHOPAEDIC SURGERY

## 2025-03-27 PROCEDURE — 2580000003 HC RX 258: Performed by: ORTHOPAEDIC SURGERY

## 2025-03-27 PROCEDURE — 7100000001 HC PACU RECOVERY - ADDTL 15 MIN: Performed by: ORTHOPAEDIC SURGERY

## 2025-03-27 PROCEDURE — 64445 NJX AA&/STRD SCIATIC NRV IMG: CPT | Performed by: ANESTHESIOLOGY

## 2025-03-27 PROCEDURE — C1713 ANCHOR/SCREW BN/BN,TIS/BN: HCPCS | Performed by: ORTHOPAEDIC SURGERY

## 2025-03-27 PROCEDURE — 2500000003 HC RX 250 WO HCPCS: Performed by: ORTHOPAEDIC SURGERY

## 2025-03-27 PROCEDURE — 3600000004 HC SURGERY LEVEL 4 BASE: Performed by: ORTHOPAEDIC SURGERY

## 2025-03-27 PROCEDURE — 3600000014 HC SURGERY LEVEL 4 ADDTL 15MIN: Performed by: ORTHOPAEDIC SURGERY

## 2025-03-27 DEVICE — BONE SCREW
Type: IMPLANTABLE DEVICE | Site: HEEL | Status: FUNCTIONAL
Brand: VARIAX

## 2025-03-27 DEVICE — LOCKING SCREW
Type: IMPLANTABLE DEVICE | Site: HEEL | Status: FUNCTIONAL
Brand: VARIAX

## 2025-03-27 DEVICE — LOCKING SCREW
Type: IMPLANTABLE DEVICE | Status: FUNCTIONAL
Brand: VARIAX

## 2025-03-27 DEVICE — GRAFT BNE SUB 15CC PARTIC 4-9.5MM CANC FRZ DRY CHIP: Type: IMPLANTABLE DEVICE | Site: HEEL | Status: FUNCTIONAL

## 2025-03-27 DEVICE — CALCANEUS MESH PLATE, MEDIUM
Type: IMPLANTABLE DEVICE | Site: HEEL | Status: FUNCTIONAL
Brand: VARIAX

## 2025-03-27 RX ORDER — SODIUM CHLORIDE 9 MG/ML
INJECTION, SOLUTION INTRAVENOUS PRN
Status: DISCONTINUED | OUTPATIENT
Start: 2025-03-27 | End: 2025-03-27 | Stop reason: HOSPADM

## 2025-03-27 RX ORDER — ONDANSETRON 2 MG/ML
INJECTION INTRAMUSCULAR; INTRAVENOUS
Status: DISCONTINUED | OUTPATIENT
Start: 2025-03-27 | End: 2025-03-27 | Stop reason: SDUPTHER

## 2025-03-27 RX ORDER — ROPIVACAINE HYDROCHLORIDE 5 MG/ML
INJECTION, SOLUTION EPIDURAL; INFILTRATION; PERINEURAL
Status: DISCONTINUED | OUTPATIENT
Start: 2025-03-27 | End: 2025-03-27 | Stop reason: SDUPTHER

## 2025-03-27 RX ORDER — SODIUM CHLORIDE 0.9 % (FLUSH) 0.9 %
5-40 SYRINGE (ML) INJECTION PRN
Status: DISCONTINUED | OUTPATIENT
Start: 2025-03-27 | End: 2025-03-27 | Stop reason: HOSPADM

## 2025-03-27 RX ORDER — PROCHLORPERAZINE EDISYLATE 5 MG/ML
5 INJECTION INTRAMUSCULAR; INTRAVENOUS
Status: DISCONTINUED | OUTPATIENT
Start: 2025-03-27 | End: 2025-03-27 | Stop reason: HOSPADM

## 2025-03-27 RX ORDER — FENTANYL CITRATE 50 UG/ML
INJECTION, SOLUTION INTRAMUSCULAR; INTRAVENOUS
Status: DISCONTINUED | OUTPATIENT
Start: 2025-03-27 | End: 2025-03-27 | Stop reason: SDUPTHER

## 2025-03-27 RX ORDER — OXYCODONE HYDROCHLORIDE 5 MG/1
5 TABLET ORAL
Status: DISCONTINUED | OUTPATIENT
Start: 2025-03-27 | End: 2025-03-27 | Stop reason: HOSPADM

## 2025-03-27 RX ORDER — FENTANYL CITRATE 50 UG/ML
100 INJECTION, SOLUTION INTRAMUSCULAR; INTRAVENOUS ONCE
Status: COMPLETED | OUTPATIENT
Start: 2025-03-27 | End: 2025-03-27

## 2025-03-27 RX ORDER — HYDROCODONE BITARTRATE AND ACETAMINOPHEN 5; 325 MG/1; MG/1
1 TABLET ORAL EVERY 4 HOURS PRN
Qty: 20 TABLET | Refills: 0 | Status: SHIPPED | OUTPATIENT
Start: 2025-03-27 | End: 2025-04-01

## 2025-03-27 RX ORDER — MIDAZOLAM HYDROCHLORIDE 2 MG/2ML
2 INJECTION, SOLUTION INTRAMUSCULAR; INTRAVENOUS ONCE
Status: COMPLETED | OUTPATIENT
Start: 2025-03-27 | End: 2025-03-27

## 2025-03-27 RX ORDER — MEPERIDINE HYDROCHLORIDE 25 MG/ML
12.5 INJECTION INTRAMUSCULAR; INTRAVENOUS; SUBCUTANEOUS EVERY 5 MIN PRN
Status: DISCONTINUED | OUTPATIENT
Start: 2025-03-27 | End: 2025-03-27 | Stop reason: HOSPADM

## 2025-03-27 RX ORDER — ONDANSETRON 2 MG/ML
4 INJECTION INTRAMUSCULAR; INTRAVENOUS
Status: DISCONTINUED | OUTPATIENT
Start: 2025-03-27 | End: 2025-03-27 | Stop reason: HOSPADM

## 2025-03-27 RX ORDER — SODIUM CHLORIDE 9 MG/ML
INJECTION, SOLUTION INTRAVENOUS CONTINUOUS
Status: DISCONTINUED | OUTPATIENT
Start: 2025-03-27 | End: 2025-03-27 | Stop reason: HOSPADM

## 2025-03-27 RX ORDER — PROPOFOL 10 MG/ML
INJECTION, EMULSION INTRAVENOUS
Status: DISCONTINUED | OUTPATIENT
Start: 2025-03-27 | End: 2025-03-27 | Stop reason: SDUPTHER

## 2025-03-27 RX ORDER — SODIUM CHLORIDE 0.9 % (FLUSH) 0.9 %
5-40 SYRINGE (ML) INJECTION EVERY 12 HOURS SCHEDULED
Status: DISCONTINUED | OUTPATIENT
Start: 2025-03-27 | End: 2025-03-27 | Stop reason: HOSPADM

## 2025-03-27 RX ORDER — HYDRALAZINE HYDROCHLORIDE 20 MG/ML
10 INJECTION INTRAMUSCULAR; INTRAVENOUS
Status: DISCONTINUED | OUTPATIENT
Start: 2025-03-27 | End: 2025-03-27 | Stop reason: HOSPADM

## 2025-03-27 RX ORDER — CEPHALEXIN 500 MG/1
500 CAPSULE ORAL 4 TIMES DAILY
Qty: 20 CAPSULE | Refills: 0 | Status: SHIPPED | OUTPATIENT
Start: 2025-03-27 | End: 2025-04-01

## 2025-03-27 RX ORDER — HYDROMORPHONE HYDROCHLORIDE 2 MG/ML
0.5 INJECTION, SOLUTION INTRAMUSCULAR; INTRAVENOUS; SUBCUTANEOUS EVERY 5 MIN PRN
Status: DISCONTINUED | OUTPATIENT
Start: 2025-03-27 | End: 2025-03-27 | Stop reason: HOSPADM

## 2025-03-27 RX ORDER — LIDOCAINE HYDROCHLORIDE 20 MG/ML
INJECTION, SOLUTION EPIDURAL; INFILTRATION; INTRACAUDAL; PERINEURAL
Status: DISCONTINUED | OUTPATIENT
Start: 2025-03-27 | End: 2025-03-27 | Stop reason: SDUPTHER

## 2025-03-27 RX ORDER — LABETALOL HYDROCHLORIDE 5 MG/ML
10 INJECTION, SOLUTION INTRAVENOUS
Status: DISCONTINUED | OUTPATIENT
Start: 2025-03-27 | End: 2025-03-27 | Stop reason: HOSPADM

## 2025-03-27 RX ORDER — BUPIVACAINE HYDROCHLORIDE 5 MG/ML
INJECTION, SOLUTION EPIDURAL; INTRACAUDAL; PERINEURAL
Status: DISCONTINUED | OUTPATIENT
Start: 2025-03-27 | End: 2025-03-27 | Stop reason: ALTCHOICE

## 2025-03-27 RX ORDER — NALOXONE HYDROCHLORIDE 0.4 MG/ML
INJECTION, SOLUTION INTRAMUSCULAR; INTRAVENOUS; SUBCUTANEOUS PRN
Status: DISCONTINUED | OUTPATIENT
Start: 2025-03-27 | End: 2025-03-27 | Stop reason: HOSPADM

## 2025-03-27 RX ORDER — DEXAMETHASONE SODIUM PHOSPHATE 4 MG/ML
INJECTION, SOLUTION INTRA-ARTICULAR; INTRALESIONAL; INTRAMUSCULAR; INTRAVENOUS; SOFT TISSUE
Status: DISCONTINUED | OUTPATIENT
Start: 2025-03-27 | End: 2025-03-27 | Stop reason: SDUPTHER

## 2025-03-27 RX ADMIN — LIDOCAINE HYDROCHLORIDE 50 MG: 20 INJECTION, SOLUTION EPIDURAL; INFILTRATION; INTRACAUDAL; PERINEURAL at 11:14

## 2025-03-27 RX ADMIN — PROPOFOL 50 MG: 10 INJECTION, EMULSION INTRAVENOUS at 11:15

## 2025-03-27 RX ADMIN — ROPIVACAINE HYDROCHLORIDE 30 ML: 5 INJECTION, SOLUTION EPIDURAL; INFILTRATION; PERINEURAL at 10:05

## 2025-03-27 RX ADMIN — FENTANYL CITRATE 25 MCG: 50 INJECTION, SOLUTION INTRAMUSCULAR; INTRAVENOUS at 11:31

## 2025-03-27 RX ADMIN — CEFAZOLIN 2000 MG: 2 INJECTION, POWDER, FOR SOLUTION INTRAMUSCULAR; INTRAVENOUS at 11:17

## 2025-03-27 RX ADMIN — SODIUM CHLORIDE: 0.9 INJECTION, SOLUTION INTRAVENOUS at 09:22

## 2025-03-27 RX ADMIN — PROPOFOL 150 MG: 10 INJECTION, EMULSION INTRAVENOUS at 11:14

## 2025-03-27 RX ADMIN — ONDANSETRON 4 MG: 2 INJECTION INTRAMUSCULAR; INTRAVENOUS at 11:22

## 2025-03-27 RX ADMIN — DEXAMETHASONE SODIUM PHOSPHATE 8 MG: 4 INJECTION, SOLUTION INTRAMUSCULAR; INTRAVENOUS at 11:22

## 2025-03-27 RX ADMIN — MIDAZOLAM 2 MG: 1 INJECTION INTRAMUSCULAR; INTRAVENOUS at 09:59

## 2025-03-27 RX ADMIN — FENTANYL CITRATE 100 MCG: 50 INJECTION INTRAMUSCULAR; INTRAVENOUS at 09:59

## 2025-03-27 RX ADMIN — FENTANYL CITRATE 25 MCG: 50 INJECTION, SOLUTION INTRAMUSCULAR; INTRAVENOUS at 11:25

## 2025-03-27 RX ADMIN — FENTANYL CITRATE 50 MCG: 50 INJECTION, SOLUTION INTRAMUSCULAR; INTRAVENOUS at 12:07

## 2025-03-27 ASSESSMENT — PAIN DESCRIPTION - LOCATION: LOCATION: FOOT

## 2025-03-27 ASSESSMENT — PAIN - FUNCTIONAL ASSESSMENT
PAIN_FUNCTIONAL_ASSESSMENT: NONE - DENIES PAIN

## 2025-03-27 ASSESSMENT — PAIN DESCRIPTION - DESCRIPTORS: DESCRIPTORS: ACHING

## 2025-03-27 ASSESSMENT — PAIN DESCRIPTION - PAIN TYPE: TYPE: ACUTE PAIN

## 2025-03-27 ASSESSMENT — LIFESTYLE VARIABLES: SMOKING_STATUS: 1

## 2025-03-27 ASSESSMENT — PAIN SCALES - GENERAL: PAINLEVEL_OUTOF10: 7

## 2025-03-27 ASSESSMENT — PAIN DESCRIPTION - ORIENTATION: ORIENTATION: LEFT

## 2025-03-27 NOTE — ANESTHESIA POSTPROCEDURE EVALUATION
Department of Anesthesiology  Postprocedure Note    Patient: Mark Anthony Chawla  MRN: [id number]  YOB: 1970  Date of evaluation: 3/27/2025    Procedure Summary       Date: 03/27/25 Room / Location: [address]    Anesthesia Start: 1111 Anesthesia Stop: 1255    Procedure: OPEN REDUCTION INTERNAL FIXATION LEFT CALCANEOUS (Left: Foot) Diagnosis:       Closed nondisplaced fracture of left calcaneus, unspecified portion of calcaneus, initial encounter      (Closed nondisplaced fracture of left calcaneus, unspecified portion of calcaneus, initial encounter [S92.002A])    Surgeons: Halina Byrne MD Responsible Provider: Micah Paul MD    Anesthesia Type: General ASA Status: 2            Anesthesia Type: General    Akash Phase I: Akash Score: 5    Akash Phase II:      Anesthesia Post Evaluation    Patient location during evaluation: PACU  Patient participation: complete - patient participated  Level of consciousness: awake and alert  Airway patency: patent  Nausea & Vomiting: no nausea and no vomiting  Cardiovascular status: hemodynamically stable  Respiratory status: acceptable  Hydration status: euvolemic  Multimodal analgesia pain management approach  Pain management: satisfactory to patient    No notable events documented.   Report received from JOCE Borden in the ICU. Awaiting patient arrival to Room 462.

## 2025-03-27 NOTE — PROGRESS NOTES
Nerve block procedure completed.  Patient tolerated well.   /84.  P73.  SaO2 98% on O2 at 2L/NC.  Shivani (girlfriend) returned to bedside.

## 2025-03-27 NOTE — ANESTHESIA PRE PROCEDURE
Department of Anesthesiology  Preprocedure Note       Name:  Mark Anthony Chawla   Age:  55 y.o.  :  1970                                          MRN:  [id number]         Date:  3/27/2025      Surgeon: Surgeon(s):  Halina Byrne MD    Procedure: Procedure(s):  OPEN REDUCTION INTERNAL FIXATION LEFT CALCANEOUS    Medications prior to admission:   Prior to Admission medications    Medication Sig Start Date End Date Taking? Authorizing Provider   HYDROcodone-acetaminophen (NORCO) 5-325 MG per tablet Take 1 tablet by mouth every 6 hours as needed for Pain for up to 5 days. Max Daily Amount: 4 tablets 3/25/25 3/30/25 Yes Halina Byrne MD   diclofenac sodium (VOLTAREN) 1 % GEL Apply 4 g topically 4 times daily for 7 days 3/19/25 3/26/25  Aurea Trevizo DO       Current medications:    Current Facility-Administered Medications   Medication Dose Route Frequency Provider Last Rate Last Admin   • 0.9 % sodium chloride infusion   IntraVENous Continuous Halina Byrne MD       • ceFAZolin (ANCEF) 2,000 mg in sterile water 20 mL IV syringe  2,000 mg IntraVENous Once Halina Byrne MD       • midazolam PF (VERSED) injection 2 mg  2 mg IntraVENous Once Micah Paul MD       • fentaNYL (SUBLIMAZE) injection 100 mcg  100 mcg IntraVENous Once Micah Paul MD           Allergies:  No Known Allergies    Problem List:    Patient Active Problem List   Diagnosis Code   • Other male erectile dysfunction N52.8   • Complex care coordination Z71.89   • Cigarette nicotine dependence without complication F17.210   • Intra-abdominal abscess (HCC) K65.1   • Colonic diverticular abscess K57.20   • Perforated sigmoid colon (HCC) K63.1   • Hepatic steatosis K76.0   • History of open sigmoidectomy Z98.890, Z90.49   • Receiving intravenous antibiotic treatment as outpatient Z79.2   • Renal lesion N28.9   • Closed displaced fracture of body of left calcaneus S92.012A   • Vapes nicotine containing substance Z72.0       Past

## 2025-03-27 NOTE — PROGRESS NOTES
healing.    The importance of smoking cessation for optimal bone and wound healing was stressed. This was communicated verbally, 5 Minutes.       Halina Byrne MD

## 2025-03-27 NOTE — DISCHARGE INSTRUCTIONS
Post op instruction:  1- D/C home  2- Dx Left calcaneus displaced fracture.  3- NWB left ankle  4- Elevation surgical site, with ice  5- Keep splint dry and clean  6- F/U in 2 weeks.  7- For DVT prophylaxis- Aspirin 325 mg daily until told to stop by our Office.    Halina Byrne MD, 3/27/2025      GENERAL SURGERY DISCHARGE INSTRUCTIONS    Follow your surgeons instructions.  Follow up with your surgeon as directed.  Observe the operative area for signs of excessive bleeding.If needed apply pressure,elevate if able and contact your surgeon.  Observe the operative site for any signs of infection- such as increased pain,redness,fever greater than 101 degrees,swelling, foul odor or drainage.Contact your surgeon if any of these symptoms are present.  Keep operative site clean and dry.  Do not remove dressing unless instructed to by surgeon.  Apply ice as directed.  Avoid pulling,pushing or tugging to suture line.  If you become short of breath call your doctor or go to the ER.  Take medications as directed.  Pain medication should be taken with food.  Do not drive or operate machinery while taking narcotics.  For any problems or question call your surgeon.        ANESTHESIA DISCHARGE INSTRUCTIONS    Wear your seatbelt home.  You are under the influence of drugs-do not drink alcohol,drive,operate machinery,or make any important decisions or sign any legal documentsfor 24 hours  A responsible adult needs to be with you for 24 hours.  You may experience lightheadedness,dizziness,or sleepiness following surgery.  Rest at home today- increase activity as tolerated.  Progress slowly to a regular diet unless your physician has instructed you otherwise.Drink plenty of water.  If nausea becomes a problem call your physician.  Coughing,sore throat,and muscle aches are other side effects of anesthesia,and should improve with time.  Do not drive,operate machinery while taking narcotics.   Females of childbearing potential and on  hormonal birth control, should use two forms of contraception following procedure if given a medication called sugammadex and/or emend. Additional contraception should be used for 7 days for sugammadex and/or 28 days for emend. These medications have a potential to reduce the effectiveness of hormonal birth control.

## 2025-03-27 NOTE — H&P
Preoperative H&P Update    The patient's History and Physical in the medical record from 3/27/2025 was reviewed by me today.    Past Medical History:   Diagnosis Date    Renal lesion 11/15/2021     Past Surgical History:   Procedure Laterality Date    COLOSTOMY N/A 10/27/2021    EXPLORATORY LAPAROTOMY, SIGMOID RESECTION, ILEOSTOMY. performed by Kirill Renteria MD at [facility] OR    COLOSTOMY CLOSURE N/A 3/1/2022    OPEN REVERSAL OF LOOP ILEOSTOMY performed by Kirill Renteria MD at [facility] OR     No current facility-administered medications on file prior to encounter.     Current Outpatient Medications on File Prior to Encounter   Medication Sig Dispense Refill    HYDROcodone-acetaminophen (NORCO) 5-325 MG per tablet Take 1 tablet by mouth every 6 hours as needed for Pain for up to 5 days. Max Daily Amount: 4 tablets 20 tablet 0    diclofenac sodium (VOLTAREN) 1 % GEL Apply 4 g topically 4 times daily for 7 days 100 g 0       No Known Allergies   I reviewed the HPI, medications, allergies, reason for surgery, diagnosis and treatment plan and there has been no change.    The patient was evaluated by me today. Physical exam findings for this update include:    Vitals:    03/27/25 0900   BP: 138/87   Pulse: 79   Resp: 12   Temp: 98 °F (36.7 °C)   SpO2: 96%     Airway is intact  Chest: chest clear, no wheezing, rales, normal symmetric air entry, no tachypnea, retractions or cyanosis  Heart: regular rate and rhythm ; heart sounds normal  Findings on exam of the body region where surgery is to be performed include:  Left calcaneus displaced fracture.    Electronically signed by Halina Byrne MD on 3/27/2025 at 10:40 AM

## 2025-03-27 NOTE — ANESTHESIA POSTPROCEDURE EVALUATION
Department of Anesthesiology  Postprocedure Note    Patient: Mark Anthony Chawla  MRN: [id number]  YOB: 1970  Date of evaluation: 3/27/2025    Procedure Summary       Date: 03/27/25 Room / Location: [address]    Anesthesia Start: 1111 Anesthesia Stop: 1255    Procedure: OPEN REDUCTION INTERNAL FIXATION LEFT CALCANEOUS (Left: Foot) Diagnosis:       Closed nondisplaced fracture of left calcaneus, unspecified portion of calcaneus, initial encounter      (Closed nondisplaced fracture of left calcaneus, unspecified portion of calcaneus, initial encounter [S92.002A])    Surgeons: Halina Byrne MD Responsible Provider: Micah Paul MD    Anesthesia Type: General ASA Status: 2            Anesthesia Type: General    Akash Phase I: Akash Score: 5    Akash Phase II:      Anesthesia Post Evaluation    Patient location during evaluation: PACU  Level of consciousness: awake and alert  Airway patency: patent  Nausea & Vomiting: no vomiting  Cardiovascular status: blood pressure returned to baseline  Respiratory status: acceptable  Hydration status: euvolemic  Multimodal analgesia pain management approach  Pain management: adequate    No notable events documented.

## 2025-03-27 NOTE — ANESTHESIA PROCEDURE NOTES
Peripheral Block    Patient location during procedure: pre-op  Reason for block: post-op pain management and at surgeon's request  Start time: 3/27/2025 9:59 AM  End time: 3/27/2025 10:05 AM  Staffing  Performed: anesthesiologist   Anesthesiologist: Micah Paul MD  Performed by: Micah Paul MD  Authorized by: Micah Paul MD    Preanesthetic Checklist  Completed: patient identified, IV checked, site marked, risks and benefits discussed, surgical/procedural consents, equipment checked, pre-op evaluation, timeout performed, anesthesia consent given, oxygen available and monitors applied/VS acknowledged  Peripheral Block   Prep: ChloraPrep  Provider prep: mask and sterile gloves  Patient monitoring: cardiac monitor, continuous pulse ox, frequent blood pressure checks and IV access  Block type: Sciatic  Popliteal  Laterality: left  Injection technique: single-shot  Guidance: ultrasound guided  Local infiltration: lidocaine  Infiltration strength: 1 %  Local infiltration: lidocaine  Dose: 3 mL    Needle   Needle gauge: 21 G  Needle localization: ultrasound guidance  Needle length: 10 cm  Assessment   Injection assessment: negative aspiration for heme, no paresthesia on injection and local visualized surrounding nerve on ultrasound  Paresthesia pain: none  Slow fractionated injection: yes  Hemodynamics: stable  Outcomes: uncomplicated and patient tolerated procedure well    Additional Notes  Immediately prior to procedure a \"time out\" was called to verify the correct patient, allergies, laterality, procedure and equipment. Time out performed with Amber COPE    Local Anesthetic: 0.5 %  Ropivacaine   Amount: 30 ml  in 5 ml increments after negative aspiration each time.    Biceps Femoris muscle (long head), Vastus lateralis muscle, Sciatic nerve (Tibia and Common Peroneal Nerves) and Popliteal artery are identified; the tip of the needle and the spread of the local anesthetic around the Tibial and

## 2025-03-27 NOTE — OP NOTE
[address] [address]                            OPERATIVE REPORT      PATIENT NAME: PATI CONTRERAS             : 1970  MED REC NO: [id number]                      ROOM: ASC OR  ACCOUNT NO: [id number]                       ADMIT DATE: 2025  PROVIDER: Halina Byrne MD      DATE OF PROCEDURE:  2025    SURGEON:  Halina Byrne MD    ASSISTANT:  Mignon Todd CNP.    PREOPERATIVE DIAGNOSIS:  Left calcaneus intra-articular displaced fracture.    POSTOPERATIVE DIAGNOSIS:  Left calcaneus intra-articular displaced fracture.    PROCEDURE DONE:  Open treatment of left calcaneus intra-articular displaced fracture with open reduction and internal fixation.    ANESTHESIA:  General anesthesia.    ESTIMATED BLOOD LOSS:  Minimal.    COMPLICATIONS:  None.    TOURNIQUET:  Left upper thigh, 300 mmHg.    IMPLANT USED:    1. Christofer titanium intermediate calcaneus VariAx locking plate and 1 lag screw.    2. 15 mL cancellous chips bone graft for structural support.    INDICATION:  This is a 55-year-old white male, who sustained the worker's comp injury, when he fell off his truck sustaining an injury to his left ankle.  He was found to have a displaced calcaneus fracture.  All risks, benefits, and alternatives were discussed with the patient, and he agreed to proceed with surgical fixation.    DESCRIPTION OF PROCEDURE:  The patient's left ankle was marked.  He received 2 g Ancef IV preoperatively.  The patient was then brought to the operating room and underwent general anesthesia.  A well-padded tourniquet was placed to left upper thigh.  The patient was then placed on the right lateral decubitus position with the left ankle up.  He was secured in a bean bag, where axillary roll was placed.  All bony prominences were well padded.  The left lower extremity was then prepped and draped in regular sterile routine fashion.  A time-out was  called confirming the patient's name, site, and procedure.    Esmarch was used for exsanguination, and tourniquet was inflated to 300 mmHg.  We used a lateral extensile approach for the calcaneus.  A full-thickness flap was elevated.  We identified and protected the sural nerve proximally and distally as well as the peroneal tendons.  We then used 3 K-wires for a hands-free retraction, 1 in the cuboid, 1 at the talar neck, and 1 in the fibula.  We then exposed the fracture.  It was found to be moderately displaced.  We then opened up the lateral wall, and then we accessed the depressed part of the posterior facet.  We used a Lind elevator, and we elevated the depressed part of the posterior facet back anatomically in place, and while maintaining the reduction, we used a K-wire for provisional fixation.  While maintaining the reduction, we used 15 mL of cancellous chips bone graft.  We then closed the lateral wall back in place and reduced the varus deformity as well of the tuberosity.  At this point, we put the plate in appropriate position.  We confirmed with the C-arm that the plate was in good position as well as the fracture.  We placed 2 nonlocking screws, 1 proximally and 1 distally, and then added 3 more locking screws.  The posterior facet piece was stabilized with an independent lag screw.  Overall, we were very satisfied with the anatomic reduction and restoration of the posterior facet as well as correction of the varus deformity.  At this point, we went ahead and let the tourniquet down.  Hemostasis was secured.  We irrigated the incision copiously with normal saline mixed with gentamicin.  We then closed the flap with a 2-0 Vicryl, subcu with a 3-0 Vicryl, and the skin with a 4-0 Monocryl.  Steri-Strips were then applied.  Dressing was then applied in the form of Xeroform, 4x4s, sterile Webril, and a splint was applied.    The patient tolerated the procedure well and was taken to the Recovery in stable

## 2025-03-27 NOTE — BRIEF OP NOTE
Brief Postoperative Note      Patient: Mark Anthony Chawla  YOB: 1970  MRN: [id number]    Date of Procedure: 3/27/2025    Pre-Op Diagnosis Codes:      * Closed nondisplaced fracture of left calcaneus, unspecified portion of calcaneus, initial encounter [S92.002A]    Post-Op Diagnosis: Same       Procedure(s):  OPEN REDUCTION INTERNAL FIXATION LEFT CALCANEOUS    Surgeon(s):  Halina Byrne MD    Assistant: MOSES Todd    Anesthesia: General    Estimated Blood Loss (mL): Minimal    Complications: None    Specimens:   * No specimens in log *    Implants:  Implant Name Type Inv. Item Serial No.  Lot No. LRB No. Used Action   GRAFT BNE SUB 15CC PARTIC 4-9.5MM CANC FRZ DRY CHIP - [id number]  GRAFT BNE SUB 15CC PARTIC 4-9.5MM CANC FRZ DRY CHIP  [id number] Left 1 Implanted   PLATE BNE M CALCNL MESH VARIAX - [id number]  PLATE BNE M CALCNL MESH VARIAX  [employer] [facility]  Left 1 Implanted   SCREW BNE L34MM DIA3.5MM VARIAX - [id number]  SCREW BNE L34MM DIA3.5MM VARIAX  [employer] [facility]  Left 1 Implanted   SCREW BNE L28MM DIA3.5MM ORTIZ TI NONLOCKING FULL THRD FOR - [id number]  SCREW BNE L28MM DIA3.5MM ORTIZ TI NONLOCKING FULL THRD FOR  [employer] [facility]  Left 1 Implanted   SCREW BNE AD PED L30MM DIA3.5MM ORTIZ TI NONCANNULATED - [id number]  SCREW BNE AD PED L30MM DIA3.5MM ORTIZ TI NONCANNULATED  [employer] [facility]  Left 1 Implanted   SCREW BNE AD PED L36MM DIA3.5MM CANC TI NONCANNULATED JEROME - [id number]  SCREW BNE AD PED L36MM DIA3.5MM CANC TI NONCANNULATED JEROME  [employer] [facility]  Left 1 Implanted   SCREW 3.5MM L30MM JEROME FT T10 - [id number]  SCREW 3.5MM L30MM JEROME FT T10  [employer] [facility]  Left 1 Implanted   SCREW BNE L32MM OD3.5MM JEROME FULL THRD T10 V - [id number]  SCREW BNE L32MM OD3.5MM JEROME FULL THRD T10 SHEEBA  [employer]  Left 1 Implanted         Drains:   Ileostomy Ileostomy RLQ (Active)

## 2025-04-04 DIAGNOSIS — S92.012A CLOSED DISPLACED FRACTURE OF BODY OF LEFT CALCANEUS, INITIAL ENCOUNTER: Primary | ICD-10-CM

## 2025-04-04 RX ORDER — HYDROCODONE BITARTRATE AND ACETAMINOPHEN 5; 325 MG/1; MG/1
1 TABLET ORAL EVERY 8 HOURS PRN
Qty: 15 TABLET | Refills: 0 | Status: SHIPPED | OUTPATIENT
Start: 2025-04-04 | End: 2025-04-09

## 2025-04-04 NOTE — TELEPHONE ENCOUNTER
Patient last seen 3/27/25 and medication last filled 3/27/25:    Requested Prescriptions     Pending Prescriptions Disp Refills    HYDROcodone-acetaminophen (NORCO) 5-325 MG per tablet 15 tablet 0     Sig: Take 1 tablet by mouth every 8 hours as needed for Pain for up to 5 days. Max Daily Amount: 3 tablets         SW patient. He stated he is ice 4+ times per day with elevation. He rated his pain at 4/10. Stated that the Norco is helping enough to make him comfortable but not taking the pain completely away. He is also taking Tylenol between doses of Norco. Stated he ran out of [place] yesterday and was only able to take 1 dose.     Patient was advised that the Norco is not meant to make him pain free, but to make him comfortable. Stated the 4/10 pain is to be expected. Advised him that an Rx request has been routed to Dr. Byrne and will be signed later today.     Patient agreed and understood.

## 2025-04-04 NOTE — TELEPHONE ENCOUNTER
Prescription Refill     Medication Name:  HYDROCODONE 5/325MG  Pharmacy: [facility] [id number] - [address] [place] JOYCE [phone] - F [phone]   Patient Contact Number:  [phone]        PATIENT REQ HIGHER MG IF POSSIBLE DUE TO AMOUNT PAIN EXPERIENCING    PLS ADVISE.

## 2025-04-08 ENCOUNTER — OFFICE VISIT (OUTPATIENT)
Dept: ORTHOPEDIC SURGERY | Age: 55
End: 2025-04-08
Payer: COMMERCIAL

## 2025-04-08 VITALS — WEIGHT: 176 LBS | BODY MASS INDEX: 25.2 KG/M2 | HEIGHT: 70 IN

## 2025-04-08 DIAGNOSIS — Z72.0 VAPES NICOTINE CONTAINING SUBSTANCE: ICD-10-CM

## 2025-04-08 DIAGNOSIS — S92.012A CLOSED DISPLACED FRACTURE OF BODY OF LEFT CALCANEUS, INITIAL ENCOUNTER: Primary | ICD-10-CM

## 2025-04-08 PROCEDURE — 99406 BEHAV CHNG SMOKING 3-10 MIN: CPT | Performed by: ORTHOPAEDIC SURGERY

## 2025-04-08 PROCEDURE — 99024 POSTOP FOLLOW-UP VISIT: CPT | Performed by: ORTHOPAEDIC SURGERY

## 2025-04-08 RX ORDER — HYDROCODONE BITARTRATE AND ACETAMINOPHEN 5; 325 MG/1; MG/1
1 TABLET ORAL EVERY 8 HOURS PRN
Qty: 15 TABLET | Refills: 0 | Status: SHIPPED | OUTPATIENT
Start: 2025-04-08 | End: 2025-04-13

## 2025-04-08 NOTE — PROGRESS NOTES
DIAGNOSIS:  Left foot calcaneus fracture, status post ORIF.    DATE OF SURGERY:  3/27/2025, Worker's Comp.    HISTORY OF PRESENT ILLNESS:  Mr. Chawla 55 y.o.  male who came in today for 2 weeks postoperative visit.  The patient denies any significant pain in the left ankle. He has been in a splint, and NWB.  No numbness or tingling sensation. No fever or Chills.     PHYSICAL EXAMINATION:  The incision healing well .  No signs of any erythema or drainage, moderate swelling. He has no pain with the active or passive range of motion of the left ankle and subtalar, but decrease ROM.  He has intact sensation distally, and he is neurovascularly intact.    IMAGING:  Two views left calcaneus and 3 views left foot showed anatomic alignment of the calcaneus, plate and screws in good position, no loosening. Calabrese view showed no varus.    IMPRESSION: 2 weeks out from left calcaneus ORIF and doing very well.    PLAN: He placed in a boot, and NWB for 12 weeks. I have told the patient to work on ROM, The patient will come back for a follow up in 6 weeks.  At that time, we will take 2 views of the left calcaneus and foot.     The patient Vapes nicotine containing substance , and we discussed with the patient the risks of smoking on general health and also on bone and soft tissue healing (delay and non-union), and promised to cut down or stop smoking.     Smoking: Educated the patient regarding the hazards of smoking and that it harms their body in many ways. It increases the chance of developing heart disease, lung disease, cancer, and other health problems including poor bone and wound healing.    The importance of smoking cessation for optimal bone and wound healing was stressed. This was communicated verbally, 5 Minutes.      Halina Byrne MD

## 2025-05-20 ENCOUNTER — OFFICE VISIT (OUTPATIENT)
Dept: ORTHOPEDIC SURGERY | Age: 55
End: 2025-05-20

## 2025-05-20 VITALS — BODY MASS INDEX: 25.2 KG/M2 | WEIGHT: 176 LBS | HEIGHT: 70 IN

## 2025-05-20 DIAGNOSIS — S92.012A CLOSED DISPLACED FRACTURE OF BODY OF LEFT CALCANEUS, INITIAL ENCOUNTER: Primary | ICD-10-CM

## 2025-05-20 PROCEDURE — 99024 POSTOP FOLLOW-UP VISIT: CPT | Performed by: NURSE PRACTITIONER

## 2025-05-20 NOTE — PROGRESS NOTES
DIAGNOSIS:  Left foot calcaneus fracture, status post ORIF.    DATE OF SURGERY:  3/27/2025, Worker's Comp.    HISTORY OF PRESENT ILLNESS:  Mr. Chawla 55 y.o.  male who came in today for 8 weeks postoperative visit.  The patient denies any significant pain in the left ankle. Rates pain a 2/10 VAS mild, aching, intermittent and are improving. Aggravating factors movement. Alleviating factors rest.  He has been in a boot and NWB.  No numbness or tingling sensation. No fever or Chills. 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     PHYSICAL EXAMINATION:  The incision healing well .  No signs of any erythema or drainage, moderate swelling. He has no pain with the active or passive range of motion of the left ankle and subtalar, but decrease ROM.  He has intact sensation distally, and he is neurovascularly intact.    IMAGING:  Two views left calcaneus and 3 views left foot showed anatomic alignment of the calcaneus, plate and screws in good position, no loosening. Calabrese view showed no varus.    IMPRESSION: 8 weeks out from left calcaneus ORIF and doing very well.    PLAN: He placed in a boot, and NWB for 12 weeks. I have told the patient to work on ROM. The patient will come back for a follow up in 6 weeks.  At that time, we will take 2 views of the left calcaneus and foot.    Off work for 6 weeks.      The patient Vapes nicotine containing substance , and we discussed with the patient the risks of smoking on general health and also on bone and soft tissue healing (delay and non-union), and promised to cut down or stop smoking.     Smoking: Educated the patient regarding the hazards of smoking and that it harms their body in many ways. It increases the chance of developing heart disease, lung disease, cancer, and other health problems including poor bone and wound healing.    The importance of smoking cessation for optimal bone and wound healing was stressed. This was communicated

## 2025-07-01 ENCOUNTER — OFFICE VISIT (OUTPATIENT)
Dept: ORTHOPEDIC SURGERY | Age: 55
End: 2025-07-01

## 2025-07-01 VITALS — BODY MASS INDEX: 25.2 KG/M2 | WEIGHT: 176 LBS | HEIGHT: 70 IN

## 2025-07-01 DIAGNOSIS — S92.012A CLOSED DISPLACED FRACTURE OF BODY OF LEFT CALCANEUS, INITIAL ENCOUNTER: Primary | ICD-10-CM

## 2025-07-01 DIAGNOSIS — Z72.0 VAPES NICOTINE CONTAINING SUBSTANCE: ICD-10-CM

## 2025-07-02 NOTE — PROGRESS NOTES
DIAGNOSIS:  Left foot calcaneus fracture, status post ORIF.    DATE OF SURGERY:  3/27/2025, Worker's Comp.    HISTORY OF PRESENT ILLNESS:  Mark Anthony Chawla 55 y.o.  male who came in today for 3 months postoperative visit.  The patient denies any significant pain in the left ankle. Rates pain a 7/10 VAS mild, aching, intermittent and are improving. Aggravating factors movement. Alleviating factors rest.  He has been in regular shoes and WB.  No numbness or tingling sensation. No fever or Chills. 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     Past Medical History:   Diagnosis Date    Fractures     Left foot    GERD (gastroesophageal reflux disease) 10/2021    Renal lesion 11/15/2021       Past Surgical History:   Procedure Laterality Date    ABDOMEN SURGERY  10/2021    COLOSTOMY N/A 10/27/2021    EXPLORATORY LAPAROTOMY, SIGMOID RESECTION, ILEOSTOMY. performed by Kirill Renteria MD at [facility] OR    COLOSTOMY CLOSURE N/A 03/01/2022    OPEN REVERSAL OF LOOP ILEOSTOMY performed by Kirill Renteria MD at [facility] OR    FOOT FRACTURE SURGERY Left 03/27/2025    OPEN REDUCTION INTERNAL FIXATION LEFT CALCANEOUS performed by Halina Byrne MD at [facility] OR       Social History     Socioeconomic History    Marital status: Single     Spouse name: Not on file    Number of children: Not on file    Years of education: Not on file    Highest education level: Not on file   Occupational History    Occupation: [employer]   Tobacco Use    Smoking status: Former     Current packs/day: 1.00     Average packs/day: 1 pack/day for 20.0 years (20.0 ttl pk-yrs)     Types: Cigarettes, E-Cigarettes    Smokeless tobacco: Never    Tobacco comments:     vape   Vaping Use    Vaping status: Every Day    Substances: Nicotine, CBD   Substance and Sexual Activity    Alcohol use: Yes     Alcohol/week: 6.0 standard drinks of alcohol     Types: 6 Cans of beer per week     Comment: 24 oz    Drug use: Yes

## 2025-07-11 ENCOUNTER — HOSPITAL ENCOUNTER (OUTPATIENT)
Dept: PHYSICAL THERAPY | Age: 55
Setting detail: THERAPIES SERIES
Discharge: HOME | End: 2025-07-11
Payer: COMMERCIAL

## 2025-07-11 DIAGNOSIS — R29.898 ANKLE WEAKNESS: ICD-10-CM

## 2025-07-11 DIAGNOSIS — M25.472 EDEMA OF LEFT ANKLE: ICD-10-CM

## 2025-07-11 DIAGNOSIS — M25.572 ACUTE LEFT ANKLE PAIN: ICD-10-CM

## 2025-07-11 DIAGNOSIS — M25.672 DECREASED RANGE OF MOTION OF LEFT ANKLE: Primary | ICD-10-CM

## 2025-07-11 PROCEDURE — 97110 THERAPEUTIC EXERCISES: CPT

## 2025-07-11 PROCEDURE — 97161 PT EVAL LOW COMPLEX 20 MIN: CPT

## 2025-07-11 PROCEDURE — 97530 THERAPEUTIC ACTIVITIES: CPT

## 2025-07-11 PROCEDURE — 97016 VASOPNEUMATIC DEVICE THERAPY: CPT

## 2025-07-11 NOTE — PLAN OF CARE
[facility] - Outpatient Rehabilitation and Therapy: [address], [place] [address] office: [phone] fax: [phone]     Physical Therapy Initial Evaluation Certification      Dear Halina Byrne MD ,    We had the pleasure of evaluating the following patient for physical therapy services at [facility].  A summary of our findings can be found in the initial assessment below.  This includes our plan of care.  If you have any questions or concerns regarding these findings, please do not hesitate to contact me at the office phone number listed above.  Thank you for the referral.     Physician Signature:_______________________________Date:__________________  By signing above (or electronic signature), therapist’s plan is approved by physician       Physical Therapy: TREATMENT/PROGRESS NOTE   Patient: Mark Anthony Chawla (55 y.o. male)   Examination Date: 2025   :  1970 MRN: [id number]   Visit #: 1   Insurance Allowable Auth Needed   18 before 25 [x]Yes    []No    Insurance: Payor: GENERIC M[employer] / Plan: GENERIC M[employer] WC / Product Type: *No Product type* /   Insurance ID: [id number] - (Worker's Comp)  Secondary Insurance (if applicable):     Khanh Lisahira Kisha   Fax: [phone]   Treatment Diagnosis:     ICD-10-CM    1. Decreased range of motion of left ankle  M25.672       2. Acute left ankle pain  M25.572       3. Ankle weakness  R29.898       4. Edema of left ankle  M25.472          Medical Diagnosis:  Closed displaced fracture of body of left calcaneus, initial encounter [S92.012A]   Referring Physician: Halina Byrne MD  PCP: No primary care provider on file.     Plan of care signed (Y/N):     Date of Patient follow up with Physician:      Plan of Care Report: EVAL today  POC update due: (10 visits /OR AUTH LIMITS, whichever is less)  2025                                             Medical

## 2025-07-15 ENCOUNTER — HOSPITAL ENCOUNTER (OUTPATIENT)
Dept: PHYSICAL THERAPY | Age: 55
Setting detail: THERAPIES SERIES
Discharge: HOME | End: 2025-07-15
Payer: COMMERCIAL

## 2025-07-15 PROCEDURE — 97016 VASOPNEUMATIC DEVICE THERAPY: CPT

## 2025-07-15 PROCEDURE — 97140 MANUAL THERAPY 1/> REGIONS: CPT

## 2025-07-15 PROCEDURE — 97110 THERAPEUTIC EXERCISES: CPT

## 2025-07-15 NOTE — FLOWSHEET NOTE
Grade II-III 4 min     PROM (4 way)  4 min                   NMR re-education (26222) resistance Sets/time Reps CUES NEEDED                                      Therapeutic Activity (18932)  Sets/time     Single leg stance with hand support  3x30s     Pt was educated on the HEP, prognosis, expectations and indications of rehab. Time was spent with patient to discuss concerns, answer questions and enhance patient functionality.     8 min  7/11   Step ups 8 in 2x10  7/15                   Modalities:  7/15  Vasopneumatic Compression (Game Ready): Applied to Ankle Left for significant edema, swelling, pain control for 10 minutes.  Relevant ICD-10 R22.4 Localized swelling of lower limb.   Girth Left Right Post Vaso   Knee: Midpatella      Knee: 5 cm above      Knee: 15 cm above      Ankle: transmalleolar      Ankle: figure 8 61.3 cm 51.5 cm 59.5 cm           Education/Home Exercise Program: Patient HEP program created electronically.  Refer to [employer] access code:    Access Code: [email or web address]  URL: [email or web address]  Date: 07/11/2025  Prepared by: Daniel Westbrook    Exercises  - Seated Gastroc Stretch with Strap  - 2 x daily - 7 x weekly - 3 reps - 30s hold  - Long Sitting Ankle Plantar Flexion with Resistance  - 1 x daily - 7 x weekly - 3 sets - 10 reps  - Ankle Eversion with Resistance  - 1 x daily - 7 x weekly - 2-3 sets - 20 reps  - Ankle Inversion with Resistance  - 1 x daily - 7 x weekly - 2-3 sets - 20 reps  - Ankle Dorsiflexion with Resistance  - 1 x daily - 7 x weekly - 2-3 sets - 20 reps  - Seated Ankle Alphabet  - 1 x daily - 7 x weekly - 1 sets - 1 reps  ASSESSMENT     Today's Assessment: Pt tolerated session well. Additional time was spent on manual therapy in order to facilitate increase ROM. Dorsiflexion was increased to 5 degrees passively following manual therapy. Throughout session, pt demonstrated improvements with initiation of EV/Inv and was able to differentiate hip and ankle frontal plane

## 2025-07-18 ENCOUNTER — HOSPITAL ENCOUNTER (OUTPATIENT)
Dept: PHYSICAL THERAPY | Age: 55
Setting detail: THERAPIES SERIES
Discharge: HOME | End: 2025-07-18
Payer: COMMERCIAL

## 2025-07-18 PROCEDURE — 97110 THERAPEUTIC EXERCISES: CPT

## 2025-07-18 PROCEDURE — 97140 MANUAL THERAPY 1/> REGIONS: CPT

## 2025-07-18 PROCEDURE — 97016 VASOPNEUMATIC DEVICE THERAPY: CPT

## 2025-07-18 NOTE — FLOWSHEET NOTE
notes for documentation efficiency.  Note: If patient does not return for scheduled/recommended follow up visits, this note will serve as a discharge from care along with the most recent update on progress.

## 2025-07-29 ENCOUNTER — APPOINTMENT (OUTPATIENT)
Dept: PHYSICAL THERAPY | Age: 55
End: 2025-07-29
Payer: COMMERCIAL

## 2025-08-01 ENCOUNTER — HOSPITAL ENCOUNTER (OUTPATIENT)
Dept: PHYSICAL THERAPY | Age: 55
Setting detail: THERAPIES SERIES
Discharge: HOME | End: 2025-08-01
Payer: COMMERCIAL

## 2025-08-01 PROCEDURE — 97140 MANUAL THERAPY 1/> REGIONS: CPT

## 2025-08-01 PROCEDURE — 97110 THERAPEUTIC EXERCISES: CPT

## 2025-08-01 NOTE — FLOWSHEET NOTE
to allow for proper functional mobility and muscle recruitment to enable patient to return to work.   [] Progressing: [] Met: [] Not Met: [] Adjusted       Overall Progression Towards Functional goals/ Treatment Progress Update:  [] Patient is progressing as expected towards functional goals listed.    [] Progression is slowed due to complexities/Impairments listed.  [] Progression has been slowed due to co-morbidities.  [x] Plan just implemented, too soon (<30days) to assess goals progression   [] Goals require adjustment due to lack of progress  [] Patient is not progressing as expected and requires additional follow up with physician  [] Other:     TREATMENT PLAN     Frequency/Duration: 2x/week for 8 weeks for the following treatment interventions:    Interventions:  Therapeutic Exercise (02062) including: strength training, ROM, and functional mobility  Therapeutic Activities (60572) including: functional mobility training and education.  Neuromuscular Re-education (46853) activation and proprioception, including postural re-education.    Gait Training (84673) for normalization of ambulation patterns and AD training.   Manual Therapy (49233) as indicated to include: Passive Range of Motion, Gr I-IV mobilizations, and Soft Tissue Mobilization  Modalities as needed that may include: Cryotherapy, Electrical Stimulation, Biofeedback, Thermal Agents, Vasoneumatic Compression, and Ultrasound  Patient education on joint protection, postural re-education, activity modification, and progression of HEP    Plan: Cont POC- Continue emphasis/focus on exercise progression, improving proper muscle recruitment and activation/motor control patterns, promoting relaxation, increasing ROM, static and dynamic balance, and kinesthetic sense and proprioception. Next visit plan to progress weights, progress reps, add new exercises, and progress balance     Electronically Signed by Daniel Westbrook PT, DPT  Date: 08/01/2025   Note: Portions

## 2025-08-05 ENCOUNTER — HOSPITAL ENCOUNTER (OUTPATIENT)
Dept: PHYSICAL THERAPY | Age: 55
Setting detail: THERAPIES SERIES
Discharge: HOME | End: 2025-08-05
Payer: COMMERCIAL

## 2025-08-05 PROCEDURE — 97140 MANUAL THERAPY 1/> REGIONS: CPT

## 2025-08-05 PROCEDURE — 97110 THERAPEUTIC EXERCISES: CPT

## 2025-08-08 ENCOUNTER — HOSPITAL ENCOUNTER (OUTPATIENT)
Dept: PHYSICAL THERAPY | Age: 55
Setting detail: THERAPIES SERIES
End: 2025-08-08
Payer: COMMERCIAL

## 2025-08-12 ENCOUNTER — HOSPITAL ENCOUNTER (OUTPATIENT)
Dept: PHYSICAL THERAPY | Age: 55
Setting detail: THERAPIES SERIES
Discharge: HOME | End: 2025-08-12
Payer: COMMERCIAL

## 2025-08-12 PROCEDURE — 97110 THERAPEUTIC EXERCISES: CPT

## 2025-08-12 PROCEDURE — 97140 MANUAL THERAPY 1/> REGIONS: CPT

## 2025-08-15 ENCOUNTER — HOSPITAL ENCOUNTER (OUTPATIENT)
Dept: PHYSICAL THERAPY | Age: 55
Setting detail: THERAPIES SERIES
Discharge: HOME | End: 2025-08-15
Payer: COMMERCIAL

## 2025-08-15 PROCEDURE — 97140 MANUAL THERAPY 1/> REGIONS: CPT

## 2025-08-15 PROCEDURE — 97110 THERAPEUTIC EXERCISES: CPT

## 2025-08-15 PROCEDURE — 97530 THERAPEUTIC ACTIVITIES: CPT

## 2025-09-02 ENCOUNTER — OFFICE VISIT (OUTPATIENT)
Dept: ORTHOPEDIC SURGERY | Age: 55
End: 2025-09-02

## 2025-09-02 VITALS — BODY MASS INDEX: 25.2 KG/M2 | HEIGHT: 70 IN | WEIGHT: 176 LBS

## 2025-09-02 DIAGNOSIS — S92.012A CLOSED DISPLACED FRACTURE OF BODY OF LEFT CALCANEUS, INITIAL ENCOUNTER: Primary | ICD-10-CM

## (undated) DEVICE — 3M™ IOBAN™ 2 ANTIMICROBIAL INCISE DRAPE 6650EZ: Brand: IOBAN™ 2

## (undated) DEVICE — SOLUTION IRRIG 500ML 0.9% SOD CHLO USP POUR PLAS BTL

## (undated) DEVICE — PADDING CAST N ADH 12X6 IN CRIMPED FINISH 100% COTTON WBRLII

## (undated) DEVICE — APPLICATOR MEDICATED 26 CC SOLUTION HI LT ORNG CHLORAPREP

## (undated) DEVICE — SUTURE PERMAHAND SZ 3-0 L18IN NONABSORBABLE BLK L26MM SH C013D

## (undated) DEVICE — SHEET, T, LAPAROTOMY, STERILE: Brand: MEDLINE

## (undated) DEVICE — SUTURE PDS + SZ 1 L96IN ABSRB VLT L65MM TP-1 1/2 CIR PDP880G

## (undated) DEVICE — SUTURE PROL SZ 1 L30IN NONABSORBABLE BLU CTX L48MM 1/2 CIR 8455H

## (undated) DEVICE — GLOVE 8 LTX ST TRIFLEX POWDER LK

## (undated) DEVICE — SUTURE VCRL + SZ 0 L27IN ABSRB UD L36MM CT-1 1/2 CIR VCPP41D

## (undated) DEVICE — UNTHREADED GUIDE WIRE: Brand: FIXOS

## (undated) DEVICE — C-ARMOR C-ARM EQUIPMENT COVERS CLEAR STERILE UNIVERSAL FIT 12 PER CASE: Brand: C-ARMOR

## (undated) DEVICE — SHEET,DRAPE,53X77,STERILE: Brand: MEDLINE

## (undated) DEVICE — SUTURE PERMAHAND SZ 0 L30IN NONABSORBABLE BLK L26MM SH 1/2 K834H

## (undated) DEVICE — SYRINGE 60ML BULB IRRIG ST LF

## (undated) DEVICE — RELOAD STPL L60MM H1.5-3.6MM REG TISS BLU GRIPPING SURF B

## (undated) DEVICE — COVER,MAYO STAND,STERILE: Brand: MEDLINE

## (undated) DEVICE — SOLUTION IRRIG 1000ML 0.9% SOD CHL USP POUR PLAS BTL

## (undated) DEVICE — STAPLER INT L34CM 60MM LNG ENDOSCP ARTC PWR + ECHELON FLX

## (undated) DEVICE — BLADE CLIPPER GEN PURP NS

## (undated) DEVICE — SWAB CULT SGL AMIES W/O CHAR FOR THRT VAG SKIN HRT CULTSWAB

## (undated) DEVICE — STERILE POLYISOPRENE POWDER-FREE SURGICAL GLOVES: Brand: PROTEXIS

## (undated) DEVICE — TOWEL,OR,DSP,ST,BLUE,DLX,10/PK,8PK/CS: Brand: MEDLINE

## (undated) DEVICE — BLADE ES ELASTOMERIC COAT INSUL DURABLE BEND UPTO 90DEG

## (undated) DEVICE — SUTURE PERMAHAND SZ 3-0 L30IN NONABSORBABLE BLK SILK BRAID A304H

## (undated) DEVICE — STAPLER INT L60MM REG TISS BLU B FRM 8 FIRING 2 ROW AUTO

## (undated) DEVICE — TOTAL TRAY, DB, 100% SILI FOLEY, 16FR 10: Brand: MEDLINE

## (undated) DEVICE — HYPODERMIC SAFETY NEEDLE: Brand: MAGELLAN

## (undated) DEVICE — GOWN,AURORA,NONREINF,RAGLAN,XXL,STERILE: Brand: MEDLINE

## (undated) DEVICE — MERCY FAIRFIELD TURNOVER KIT: Brand: MEDLINE INDUSTRIES, INC.

## (undated) DEVICE — STRIP,CLOSURE,WOUND,MEDI-STRIP,1/2X4: Brand: MEDLINE

## (undated) DEVICE — SEALER ENDOSCP NANO COAT OPN DIV CRV L JAW LIGASURE IMPACT

## (undated) DEVICE — ELECTRODE PT RET AD L9FT HI MOIST COND ADH HYDRGEL CORDED

## (undated) DEVICE — INTENDED FOR TISSUE SEPARATION, AND OTHER PROCEDURES THAT REQUIRE A SHARP SURGICAL BLADE TO PUNCTURE OR CUT.: Brand: BARD-PARKER ® STAINLESS STEEL BLADES

## (undated) DEVICE — STERILE POLYISOPRENE POWDER-FREE SURGICAL GLOVES WITH EMOLLIENT COATING: Brand: PROTEXIS

## (undated) DEVICE — TRAY PREP DRY W/ PREM GLV 2 APPL 6 SPNG 2 UNDPD 1 OVERWRAP

## (undated) DEVICE — COLLECTOR SPEC RAYON LIQ STUART DBL FOR THRT VAG SKIN WND

## (undated) DEVICE — APPLIER CLP L L13IN TI MULT RNG HNDL 20 CLP STR LIGACLP

## (undated) DEVICE — DRAIN CHN 19FR L0.25IN DIA6.3MM SIL RND HUBLESS FULL FLUT

## (undated) DEVICE — DRESSING,GAUZE,XEROFORM,CURAD,1"X8",ST: Brand: CURAD

## (undated) DEVICE — TOWEL,OR,DSP,ST,BLUE,STD,4/PK,20PK/CS: Brand: MEDLINE

## (undated) DEVICE — RELOAD STPL H4.1X2MM DIA60MM THCK TISS GRN 6 ROW PWR GST B

## (undated) DEVICE — DRAPE,LAP,CHOLE,W/TROUGHS,STERILE: Brand: MEDLINE

## (undated) DEVICE — MASTISOL ADHESIVE LIQ 2/3ML

## (undated) DEVICE — STAPLER INT L28CM 60MM 12 FIRING B FRM PWD + ECHELON FLX

## (undated) DEVICE — DRAPE THER FLUID WARMING 66X44 IN FLAT SLUSH DBL DISC ORS

## (undated) DEVICE — BLANKET WRM W29.9XL79.1IN UP BODY FORC AIR MISTRAL-AIR

## (undated) DEVICE — MAJOR SET UP PK

## (undated) DEVICE — DRAPE C ARM W/ POLY STRP W42XL72IN FOR MOB XR

## (undated) DEVICE — SPONGE LAP W18XL18IN WHT COT 4 PLY FLD STRUNG RADPQ DISP ST

## (undated) DEVICE — LOWER EXTREMITY: Brand: MEDLINE INDUSTRIES, INC.

## (undated) DEVICE — DRAPE,U/ SHT,SPLIT,PLAS,STERIL: Brand: MEDLINE

## (undated) DEVICE — Z DISCONTINUED USE 2272117 DRAPE SURG 3 QTR N INVASIVE 2 LAYR DISP

## (undated) DEVICE — RELOAD STPL L75MM OPN H3.8MM CLS 1.5MM WIRE DIA0.2MM REG

## (undated) DEVICE — ULTRACLEAN ACCESSORY ELECTRODE, 6 INCH COATED BLADE WITH EXTENDED INSULATION: Brand: ULTRACLEAN

## (undated) DEVICE — STAPLER SKIN H3.9MM WIRE DIA0.58MM CRWN 6.9MM 35 STPL ROT

## (undated) DEVICE — KIRSCHNER WIRE

## (undated) DEVICE — SUTURE VCRL + SZ 0 L18IN ABSRB CLR VCP112G

## (undated) DEVICE — 2.5 DRILL BIT

## (undated) DEVICE — PAD THRM M SPL TORSO

## (undated) DEVICE — COVER LT HNDL BLU PLAS

## (undated) DEVICE — BANDAGE COMPR W6INXL10YD ST M E WHITE/BEIGE

## (undated) DEVICE — SYRINGE MED 10ML TRNSLUC BRL PLUNG BLK MRK POLYPR CTRL

## (undated) DEVICE — SUTURE VICRYL + SZ 3-0 L18IN ABSRB UD SH 1/2 CIR TAPERCUT NDL VCP864D

## (undated) DEVICE — PAD ABSRB W8XL10IN ABD HYDROPHOBIC NONWOVEN THCK LAYR CELOS

## (undated) DEVICE — TOWEL,OR,DSP,ST,WHITE,DLX,XR,4/PK,20PK/C: Brand: MEDLINE

## (undated) DEVICE — SUTURE VCRL + SZ 3-0 L27IN ABSRB UD L26MM SH 1/2 CIR VCP416H

## (undated) DEVICE — CATHETER,URETHRAL,REDRUBBER,STRL,16FR: Brand: MEDLINE

## (undated) DEVICE — BASIC SINGLE BASIN 1-LF: Brand: MEDLINE INDUSTRIES, INC.

## (undated) DEVICE — GAUZE,PACKING STRIP,IODOFORM,1/2"X5YD,ST: Brand: CURAD

## (undated) DEVICE — STAPLER INT DIA29MM CLS STPL H1.5-2.2MM OPN LEG L5.2MM 26

## (undated) DEVICE — GLOVE ORTHO 8   MSG9480

## (undated) DEVICE — T-DRAPE,EXTREMITY,STERILE: Brand: MEDLINE

## (undated) DEVICE — SUTURE VCRL + SZ 3-0 L18IN ABSRB UD SH 1/2 CIR TAPERCUT NDL VCP864D

## (undated) DEVICE — Device

## (undated) DEVICE — GAUZE,SPONGE,4"X4",8PLY,STRL,LF,10/TRAY: Brand: MEDLINE

## (undated) DEVICE — SUTURE MONOCRYL + SZ 4-0 L27IN ABSRB UD L19MM PS-2 3/8 CIR MCP426H

## (undated) DEVICE — PADDING CAST W4INXL4YD ST COT RAYON MICROPLEATED HIGHLY

## (undated) DEVICE — SUTURE VICRYL + SZ 2-0 L18IN ABSRB UD CT1 L36MM 1/2 CIR VCP839D

## (undated) DEVICE — SUTURE PERMAHAND SZ 2-0 L30IN NONABSORBABLE BLK SILK W/O A305H